# Patient Record
Sex: FEMALE | Race: WHITE | Employment: OTHER | URBAN - METROPOLITAN AREA
[De-identification: names, ages, dates, MRNs, and addresses within clinical notes are randomized per-mention and may not be internally consistent; named-entity substitution may affect disease eponyms.]

---

## 2017-07-25 ENCOUNTER — HOSPITAL ENCOUNTER (OUTPATIENT)
Dept: VASCULAR LAB | Age: 82
Discharge: HOME | End: 2017-07-25
Attending: INTERNAL MEDICINE | Admitting: INTERNAL MEDICINE

## 2017-07-25 DIAGNOSIS — R42 DIZZINESS AND GIDDINESS: ICD-10-CM

## 2017-08-24 ENCOUNTER — OFFICE VISIT (OUTPATIENT)
Dept: VASCULAR SURGERY | Age: 82
End: 2017-08-24

## 2017-08-24 VITALS
DIASTOLIC BLOOD PRESSURE: 74 MMHG | BODY MASS INDEX: 24.59 KG/M2 | HEIGHT: 66 IN | SYSTOLIC BLOOD PRESSURE: 134 MMHG | WEIGHT: 153 LBS

## 2017-08-24 DIAGNOSIS — I65.21 CAROTID STENOSIS, ASYMPTOMATIC, RIGHT: Primary | ICD-10-CM

## 2017-08-24 PROCEDURE — 1090F PRES/ABSN URINE INCON ASSESS: CPT | Performed by: SURGERY

## 2017-08-24 PROCEDURE — G8400 PT W/DXA NO RESULTS DOC: HCPCS | Performed by: SURGERY

## 2017-08-24 PROCEDURE — 1123F ACP DISCUSS/DSCN MKR DOCD: CPT | Performed by: SURGERY

## 2017-08-24 PROCEDURE — 1036F TOBACCO NON-USER: CPT | Performed by: SURGERY

## 2017-08-24 PROCEDURE — 4040F PNEUMOC VAC/ADMIN/RCVD: CPT | Performed by: SURGERY

## 2017-08-24 PROCEDURE — G8427 DOCREV CUR MEDS BY ELIG CLIN: HCPCS | Performed by: SURGERY

## 2017-08-24 PROCEDURE — G8598 ASA/ANTIPLAT THER USED: HCPCS | Performed by: SURGERY

## 2017-08-24 PROCEDURE — 99204 OFFICE O/P NEW MOD 45 MIN: CPT | Performed by: SURGERY

## 2017-08-24 PROCEDURE — G8420 CALC BMI NORM PARAMETERS: HCPCS | Performed by: SURGERY

## 2017-10-30 ENCOUNTER — HOSPITAL ENCOUNTER (OUTPATIENT)
Dept: WOMENS IMAGING | Age: 82
Discharge: HOME | End: 2017-10-30
Attending: INTERNAL MEDICINE | Admitting: INTERNAL MEDICINE

## 2017-10-30 DIAGNOSIS — Z12.31 VISIT FOR SCREENING MAMMOGRAM: ICD-10-CM

## 2017-12-05 ENCOUNTER — OFFICE VISIT (OUTPATIENT)
Dept: ORTHOPEDIC SURGERY | Age: 82
End: 2017-12-05

## 2017-12-05 VITALS
DIASTOLIC BLOOD PRESSURE: 71 MMHG | HEART RATE: 66 BPM | WEIGHT: 153 LBS | HEIGHT: 66 IN | TEMPERATURE: 97.3 F | BODY MASS INDEX: 24.59 KG/M2 | SYSTOLIC BLOOD PRESSURE: 139 MMHG

## 2017-12-05 DIAGNOSIS — M25.562 LEFT KNEE PAIN, UNSPECIFIED CHRONICITY: Primary | ICD-10-CM

## 2017-12-05 DIAGNOSIS — S76.312A LEFT HAMSTRING MUSCLE STRAIN, INITIAL ENCOUNTER: ICD-10-CM

## 2017-12-05 PROCEDURE — G8400 PT W/DXA NO RESULTS DOC: HCPCS | Performed by: PHYSICIAN ASSISTANT

## 2017-12-05 PROCEDURE — 4040F PNEUMOC VAC/ADMIN/RCVD: CPT | Performed by: PHYSICIAN ASSISTANT

## 2017-12-05 PROCEDURE — 1036F TOBACCO NON-USER: CPT | Performed by: PHYSICIAN ASSISTANT

## 2017-12-05 PROCEDURE — 1123F ACP DISCUSS/DSCN MKR DOCD: CPT | Performed by: PHYSICIAN ASSISTANT

## 2017-12-05 PROCEDURE — G8420 CALC BMI NORM PARAMETERS: HCPCS | Performed by: PHYSICIAN ASSISTANT

## 2017-12-05 PROCEDURE — G8598 ASA/ANTIPLAT THER USED: HCPCS | Performed by: PHYSICIAN ASSISTANT

## 2017-12-05 PROCEDURE — 99213 OFFICE O/P EST LOW 20 MIN: CPT | Performed by: PHYSICIAN ASSISTANT

## 2017-12-05 PROCEDURE — G8427 DOCREV CUR MEDS BY ELIG CLIN: HCPCS | Performed by: PHYSICIAN ASSISTANT

## 2017-12-05 PROCEDURE — 1090F PRES/ABSN URINE INCON ASSESS: CPT | Performed by: PHYSICIAN ASSISTANT

## 2017-12-05 PROCEDURE — G8484 FLU IMMUNIZE NO ADMIN: HCPCS | Performed by: PHYSICIAN ASSISTANT

## 2017-12-05 RX ORDER — NAPROXEN 500 MG/1
500 TABLET ORAL 2 TIMES DAILY WITH MEALS
Qty: 60 TABLET | Refills: 0 | Status: SHIPPED | OUTPATIENT
Start: 2017-12-05 | End: 2018-12-03

## 2017-12-05 RX ORDER — HYDROCODONE BITARTRATE AND ACETAMINOPHEN 5; 325 MG/1; MG/1
1 TABLET ORAL EVERY 6 HOURS PRN
Qty: 28 TABLET | Refills: 0 | Status: SHIPPED | OUTPATIENT
Start: 2017-12-05 | End: 2017-12-12

## 2017-12-07 NOTE — PROGRESS NOTES
Never Used    Alcohol use Yes      Comment: social    Drug use: No    Sexual activity: Not on file       Current Outpatient Prescriptions   Medication Sig Dispense Refill    naproxen (NAPROSYN) 500 MG tablet Take 1 tablet by mouth 2 times daily (with meals) for 30 doses 60 tablet 0    HYDROcodone-acetaminophen (NORCO) 5-325 MG per tablet Take 1 tablet by mouth every 6 hours as needed for Pain . 28 tablet 0    sucralfate (CARAFATE) 1 GM tablet Take 1 g by mouth. BEFORE MEALS      lansoprazole (PREVACID) 30 MG capsule Take 30 mg by mouth daily.  aspirin 81 MG tablet Take 81 mg by mouth daily.  Misc Intestinal Radha Regulat (ALIGN) CAPS Take  by mouth daily.  Multiple Vitamin (MULTI-VITAMIN PO) Take  by mouth daily.  CALCIUM ACETATE by Does not apply route 2 times daily.  GLUCOSAMINE CHONDROITIN COMPLX PO Take  by mouth 2 times daily.  metoprolol (LOPRESSOR) 50 MG tablet Take 50 mg by mouth 2 times daily.  valsartan (DIOVAN) 160 MG TABS Take 160 mg by mouth daily. No current facility-administered medications for this visit. Objective:     She is alert, oriented x 3, pleasant, well nourished, developed and in no   acute distress. /71   Pulse 66   Temp 97.3 °F (36.3 °C)   Ht 5' 6\" (1.676 m)   Wt 153 lb (69.4 kg)   BMI 24.69 kg/m²      Examination of the left hip(s) shows: Full ROM without pain. Non tender to palpation. No erythema or soft tissue swelling. There is no pain with weight bearing and no limp with ambulation. There is no instability of the hip joint. There is mild fullness and tenderness in the proximal left hamstring region. Passive stretching of the left hamstring reproduces her pain. Examination of the left knee shows: The alignment of the knee is neutral.   There is not erythema. There no soft tissue swelling. There is no effusion.   ROM-  Extension 0          -   Flexion  125   There no pain associated with ROM testing. Extensor Mechanism is  intact. Examination of the lower extremities are intact with sensation to light touch. Motor testing  5/5 in all major motor groups of the lower extremities. Gait is normal heel to toe. Gait is not antalgic. Negative Olvera's Sign. SLR negative. Examination of the lower extremities shows intact perfusion to all extremities. No cyanosis. Digits are warm to touch, capillary refill is less than 2 seconds. no edema noted. Examination of the skin over both lower extremities reveals: The skin to be intact without lacerations or abrasions. No significant erythema. No rashes or skin lesions. X Rays: not performed in the office today as I believe this is all a soft tissue injury of the hamstring. Additional Tests reviewed: Additional Outside Records reviewed:     Diagnosis:       ICD-10-CM ICD-9-CM    1. Left knee pain, unspecified chronicity M25.562 719.46    2. Left hamstring muscle strain, initial encounter V90.461H 843.8 Ambulatory referral to Physical Therapy        Assessment and Plan:     The natural history of the patient's diagnosis as well as the treatment options were discussed in full and questions were answered. Risks and benefits of the treatment options also reviewed in detail. I believe she has a partial tear of the proximal hamstring musculature. I do not feel she has a tendon injury. I do not feel this is a bony structural injury. Rest, Ice, Compression and Elevation  OTC NSAID'S discussed to be taken in appropriate  therapeutic doses. Activity restriction/ Modification discussed. The patient was advised that NSAID-type medications have two very important potential side effects: gastrointestinal irritation including hemorrhage and renal injuries. She was asked to take the medication with food and to stop if she experiences any GI upset. I asked her to call for vomiting, abdominal pain or black/bloody stools.  He should

## 2017-12-19 ENCOUNTER — HOSPITAL ENCOUNTER (OUTPATIENT)
Dept: OTHER | Age: 82
Discharge: HOME | End: 2017-12-31
Attending: PHYSICIAN ASSISTANT | Admitting: PHYSICIAN ASSISTANT

## 2017-12-19 DIAGNOSIS — I65.23 BILATERAL CAROTID ARTERY STENOSIS: Primary | ICD-10-CM

## 2017-12-19 ASSESSMENT — PAIN DESCRIPTION - ORIENTATION: ORIENTATION: LEFT

## 2017-12-19 ASSESSMENT — PAIN DESCRIPTION - PROGRESSION: CLINICAL_PROGRESSION: GRADUALLY WORSENING

## 2017-12-19 ASSESSMENT — PAIN DESCRIPTION - PAIN TYPE: TYPE: ACUTE PAIN

## 2017-12-19 ASSESSMENT — PAIN DESCRIPTION - ONSET: ONSET: SUDDEN

## 2017-12-19 ASSESSMENT — PAIN SCALES - GENERAL: PAINLEVEL_OUTOF10: 10

## 2017-12-19 ASSESSMENT — PAIN DESCRIPTION - LOCATION: LOCATION: LEG

## 2017-12-19 ASSESSMENT — PAIN DESCRIPTION - FREQUENCY: FREQUENCY: CONTINUOUS

## 2017-12-19 ASSESSMENT — PAIN DESCRIPTION - DESCRIPTORS: DESCRIPTORS: BURNING;SHARP;CONSTANT

## 2017-12-19 NOTE — FLOWSHEET NOTE
while pt was prone  Modalities:    US x 8 min to left hamstring while prone at 1/4/W/cm2, CP x 10 min to hamstring  Timed Code Treatment Minutes:    35  Total Treatment Minutes:    65  Treatment/Activity Tolerance:  [x] Patient tolerated treatment well [] Patient limited by fatigue  [] Patient limited by pain  [] Patient limited by other medical complications  [] Other:     Prognosis: [x] Good [] Fair  [] Poor    Patient Requires Follow-up: [x] Yes  [] No    Plan:   [] Continue per plan of care [] Alter current plan (see comments)  [x] Plan of care initiated [] Hold pending MD visit [] Discharge  Plan for Next Session:    Continue with US, STM to hamstring and ice. Add exercises as able to tolerate.   Electronically signed by:  Cindi Whiteside PT

## 2017-12-19 NOTE — PLAN OF CARE
Outpatient Physical Therapy  [] [facility]    Phone: [phone]   Fax: [phone]   [x] [facility]  Phone: [phone]              Fax: [phone]  [] [facility]   Phone: [phone]   Fax: [phone]     To: Referring Practitioner: LA Perrin      Patient: Sarai Christian   : 1935   MRN: [id number]  Evaluation Date: 2017      Diagnosis Information:  · Diagnosis: Left hamstring muscle strain   · Treatment Diagnosis: Pain on left hamstring, weakness and limited flexibility of left hamstring, limited standing and walking time     Physical Therapy Certification/Re-Certification Form  Dear LA Perrin,    The following patient has been evaluated for physical therapy services and for therapy to continue, Medicare requires monthly physician review of the treatment plan. Please review the attached evaluation and/or summary of the patient's plan of care, and verify that you agree therapy should continue by signing the attached document and sending it back to our office. Plan of Care/Treatment to date:  [x] Therapeutic Exercise    [x] Modalities:  [x] Therapeutic Activity     [x] Ultrasound  [] Electrical Stimulation  [] Gait Training      [] Cervical Traction [] Lumbar Traction  [] Neuromuscular Re-education    [x] Cold/hotpack [] Iontophoresis   [x] Instruction in HEP     Other:  [x] Manual Therapy      []             [] Aquatic Therapy      []           ? Frequency/Duration:  # Days per week: [] 1 day # Weeks: [] 1 week [] 5 weeks     [x] 2 days? [] 2 weeks [] 6 weeks     [] 3 days   [] 3 weeks [] 7 weeks     [] 4 days   [x] 4 weeks [] 8 weeks    Rehab Potential: [] Excellent [x] Good [] Fair  [] Poor       Electronically signed by:  Erma Jeong PT      If you have any questions or concerns, please don't hesitate to call.   Thank you for your referral.      Physician Signature:________________________________Date:__________________  By signing above,

## 2017-12-26 ENCOUNTER — HOSPITAL ENCOUNTER (OUTPATIENT)
Dept: PHYSICAL THERAPY | Age: 82
Discharge: HOME | End: 2017-12-27
Admitting: PHYSICIAN ASSISTANT

## 2017-12-29 ENCOUNTER — HOSPITAL ENCOUNTER (OUTPATIENT)
Dept: PHYSICAL THERAPY | Age: 82
Discharge: HOME | End: 2017-12-30
Admitting: PHYSICIAN ASSISTANT

## 2018-01-01 ENCOUNTER — HOSPITAL ENCOUNTER (OUTPATIENT)
Dept: OTHER | Age: 83
Discharge: HOME | End: 2018-01-31
Attending: PHYSICIAN ASSISTANT | Admitting: PHYSICIAN ASSISTANT

## 2018-01-02 ENCOUNTER — HOSPITAL ENCOUNTER (OUTPATIENT)
Dept: PHYSICAL THERAPY | Age: 83
Discharge: HOME | End: 2018-01-03
Admitting: PHYSICIAN ASSISTANT

## 2018-01-02 NOTE — FLOWSHEET NOTE
Physical Therapy Daily Treatment Note  Date:  2018    Patient Name:  Charlotte Palumbo    :  1935  MRN: [id number]  Restrictions/Precautions:    Pertinent Medical History: Arthritis, blood clots, HTN  Medical/Treatment Diagnosis Information:  · Diagnosis: Left hamstring muscle strain  · Treatment Diagnosis: Pain on left hamstring, weakness and limited flexibility of left hamstring, limited standing and walking time  Insurance/Certification information:  PT Insurance Information: Medicare  Physician Information:  Referring Practitioner: LA Upton  Plan of care signed (Y/N):  Sent to Teri Murguia on 17  Visit# / total visits:   Pain level: 5/10     G-Code (if applicable):      Date / Visit # G-Code Applied: 17 visit  PT G-Codes  Functional Assessment Tool Used: LEFSTOOL  Score: 29  Functional Limitation: Mobility: Walking and moving around  Mobility: Walking and Moving Around Current Status (): At least 60 percent but less than 80 percent impaired, limited or restricted  Mobility: Walking and Moving Around Goal Status (): At least 40 percent but less than 60 percent impaired, limited or restricted    Progress Note: [x]  Yes  []  No  Next due by: Visit #10      History of Injury:   Pt was standing on stepstool and reached overhead to clean beams and she felt something pull on posterior left thigh. The next day, she had pain. It has gotten worse  over time and she finally went  to the doctor on Dec 5 and he referred her to PT. Subjective:   17 Patient reports hamstring is not as bad.  17: Pt reports that she feels a little better. She stood preparing dinner, but took breaks to sit down. 18 Patient reports she is able to stand and walk longer without increase of pain.     See above  Objective: See eval  Observation:   Test measurements:      Exercises:  Exercise/Equipment Resistance/Repetitions Other comments   Ankle pumps 30X    HS stretch Gentle stretches, 10\" x3 Add after pain diminishes in hamstring, 12/29/17: tried gentle stretches                                                                  Other Therapeutic Activities:    Reviewed LEFSTOOL and explained purpose  Home Exercise Program:      Manual Treatments:    STM on left hamstring while pt was prone. kinesiotape  Modalities:    US x 8 min to left hamstring while prone at 1/4/W/cm2, CP x 10 min to hamstring  Timed Code Treatment Minutes:    45  Total Treatment Minutes:    55  Treatment/Activity Tolerance:  [x] Patient tolerated treatment well [] Patient limited by fatigue  [] Patient limited by pain  [] Patient limited by other medical complications  [] Other:     Prognosis: [x] Good [] Fair  [] Poor    Patient Requires Follow-up: [x] Yes  [] No    Plan:   [] Continue per plan of care [] Alter current plan (see comments)  [x] Plan of care initiated [] Hold pending MD visit [] Discharge  Plan for Next Session:    Continue with US, STM to hamstring and ice. Add exercises as able to tolerate.   Electronically signed by:  Erika Rader PTA

## 2018-01-05 ENCOUNTER — HOSPITAL ENCOUNTER (OUTPATIENT)
Dept: PHYSICAL THERAPY | Age: 83
Discharge: HOME | End: 2018-01-06
Admitting: PHYSICIAN ASSISTANT

## 2018-01-05 NOTE — FLOWSHEET NOTE
Physical Therapy Daily Treatment Note  Date:  2018    Patient Name:  Roxanna Cox    :  1935  MRN: [id number]  Restrictions/Precautions:    Pertinent Medical History: Arthritis, blood clots, HTN  Medical/Treatment Diagnosis Information:  · Diagnosis: Left hamstring muscle strain  · Treatment Diagnosis: Pain on left hamstring, weakness and limited flexibility of left hamstring, limited standing and walking time  Insurance/Certification information:  PT Insurance Information: Medicare  Physician Information:  Referring Practitioner: LA Tejada  Plan of care signed (Y/N):  Sent to Devon Trujillo on 17  Visit# / total visits:   Pain level: 5/10     G-Code (if applicable):      Date / Visit # G-Code Applied: 17 visit  PT G-Codes  Functional Assessment Tool Used: LEFSTOOL  Score: 29  Functional Limitation: Mobility: Walking and moving around  Mobility: Walking and Moving Around Current Status (): At least 60 percent but less than 80 percent impaired, limited or restricted  Mobility: Walking and Moving Around Goal Status (): At least 40 percent but less than 60 percent impaired, limited or restricted    Progress Note: [x]  Yes  []  No  Next due by: Visit #10      History of Injury:   Pt was standing on stepstool and reached overhead to clean beams and she felt something pull on posterior left thigh. The next day, she had pain. It has gotten worse  over time and she finally went  to the doctor on Dec 5 and he referred her to PT. Subjective:   17 Patient reports hamstring is not as bad.  17: Pt reports that she feels a little better. She stood preparing dinner, but took breaks to sit down. 18 Patient reports she is able to stand and walk longer without increase of pain. 18: Pt reports she feels much better.     Objective: See eval  Observation:   Test measurements:      Exercises:  Exercise/Equipment Resistance/Repetitions Other comments

## 2018-01-09 ENCOUNTER — HOSPITAL ENCOUNTER (OUTPATIENT)
Dept: PHYSICAL THERAPY | Age: 83
Discharge: HOME | End: 2018-01-10
Admitting: PHYSICIAN ASSISTANT

## 2018-01-12 ENCOUNTER — HOSPITAL ENCOUNTER (OUTPATIENT)
Dept: PHYSICAL THERAPY | Age: 83
Discharge: HOME | End: 2018-01-13
Admitting: PHYSICIAN ASSISTANT

## 2018-01-18 ENCOUNTER — HOSPITAL ENCOUNTER (OUTPATIENT)
Dept: PHYSICAL THERAPY | Age: 83
Discharge: HOME | End: 2018-01-19
Admitting: PHYSICIAN ASSISTANT

## 2018-01-18 NOTE — PLAN OF CARE
Outpatient Physical Therapy  [] [facility]    Phone: [phone]   Fax: [phone]   [x] [facility]  Phone: [phone]              Fax: [phone]  [] [facility]   Phone: [phone]   Fax: [phone]     To:   Radha TOVAR     Patient: Irene Conroy   : 1935   MRN: [id number]  Evaluation Date: 2018      Diagnosis Information:  ·   Diagnosis: Left hamstring muscle strain          · Treatment Diagnosis: Pain on left hamstring, weakness and limited flexibility of left hamstring, limited standing and walking time    ·       Physical Therapy Certification/Re-Certification Form  Dear LA Sharpe,    The following patient has been evaluated for physical therapy services and for therapy to continue, Medicare requires monthly physician review of the treatment plan. Please review the attached evaluation and/or summary of the patient's plan of care, and verify that you agree therapy should continue by signing the attached document and sending it back to our office. 18: Pt reports her hamstring is worse. After questioning, she reported that she had to help her  up from the floor twice, she shoveled snow and lifted [place] boxes and decorations to put them away. Prior to this she was feeling better, In view of set back due to the above activities, I would like to request 4 additional PT visits. Plan of Care/Treatment to date:  [x] Therapeutic Exercise    [x] Modalities:  [x] Therapeutic Activity     [x] Ultrasound  [] Electrical Stimulation  [] Gait Training      [] Cervical Traction [] Lumbar Traction  [] Neuromuscular Re-education    [x] Cold/hotpack [] Iontophoresis   [x] Instruction in HEP     Other:  [x] Manual Therapy      []             [] Aquatic Therapy      []           ? Frequency/Duration:  # Days per week: [] 1 day # Weeks: [] 1 week [] 5 weeks     [x] 2 days?    [x] 2 weeks [] 6 weeks     [] 3 days   [] 3 weeks [] 7 weeks     [] 4 days   [] 4 weeks [] 8 weeks    Rehab Potential: [] Excellent [x] Good [] Fair  [] Poor       Electronically signed by:  Lynn Steward PT      If you have any questions or concerns, please don't hesitate to call.   Thank you for your referral.      Physician Signature:________________________________Date:__________________  By signing above, therapists plan is approved by physician

## 2018-01-18 NOTE — FLOWSHEET NOTE
Physical Therapy Daily Treatment Note  Date:  2018    Patient Name:  Tanesha Guy    :  1935  MRN: [id number]  Restrictions/Precautions:    Pertinent Medical History: Arthritis, blood clots, HTN  Medical/Treatment Diagnosis Information:  · Diagnosis: Left hamstring muscle strain  · Treatment Diagnosis: Pain on left hamstring, weakness and limited flexibility of left hamstring, limited standing and walking time  Insurance/Certification information:  PT Insurance Information: Medicare  Physician Information:  Referring Practitioner: LA Ugarte  Plan of care signed (Y/N):  Sent to Cat Matthew on 17  Visit# / total visits:   Pain level: 6+/10     G-Code (if applicable):      Date / Visit # G-Code Applied: 17 visit  PT G-Codes  Functional Assessment Tool Used: LEFSTOOL  Score: 29  Functional Limitation: Mobility: Walking and moving around  Mobility: Walking and Moving Around Current Status (): At least 60 percent but less than 80 percent impaired, limited or restricted  Mobility: Walking and Moving Around Goal Status (): At least 40 percent but less than 60 percent impaired, limited or restricted    Progress Note: [x]  Yes  []  No  Next due by: Visit #10      History of Injury:   Pt was standing on stepstool and reached overhead to clean beams and she felt something pull on posterior left thigh. The next day, she had pain. It has gotten worse  over time and she finally went  to the doctor on Dec 5 and he referred her to PT. Subjective:   17 Patient reports hamstring is not as bad.  17: Pt reports that she feels a little better. She stood preparing dinner, but took breaks to sit down. 18 Patient reports she is able to stand and walk longer without increase of pain. 18: Pt reports she feels much better. 18: Patient reports she is feeling better. 18 Patient reports she cont. to improve.   18: Pt reports her

## 2018-01-22 ENCOUNTER — HOSPITAL ENCOUNTER (OUTPATIENT)
Dept: PHYSICAL THERAPY | Age: 83
Discharge: HOME | End: 2018-01-23
Admitting: PHYSICIAN ASSISTANT

## 2018-01-22 NOTE — FLOWSHEET NOTE
Physical Therapy Daily Treatment Note  Date:  2018    Patient Name:  Blair Arboleda    :  1935  MRN: [id number]  Restrictions/Precautions:    Pertinent Medical History: Arthritis, blood clots, HTN  Medical/Treatment Diagnosis Information:  · Diagnosis: Left hamstring muscle strain  · Treatment Diagnosis: Pain on left hamstring, weakness and limited flexibility of left hamstring, limited standing and walking time  Insurance/Certification information:  PT Insurance Information: Medicare  Physician Information:  Referring Practitioner: LA Hogue  Plan of care signed (Y/N):  Sent to Jayme Velazquez on 17  Visit# / total visits:   Pain level: 2/10     G-Code (if applicable):      Date / Visit # G-Code Applied: 17 visit  PT G-Codes  Functional Assessment Tool Used: LEFSTOOL  Score: 29  Functional Limitation: Mobility: Walking and moving around  Mobility: Walking and Moving Around Current Status (): At least 60 percent but less than 80 percent impaired, limited or restricted  Mobility: Walking and Moving Around Goal Status (): At least 40 percent but less than 60 percent impaired, limited or restricted    Progress Note: [x]  Yes  []  No  Next due by: Visit #10      History of Injury:   Pt was standing on stepstool and reached overhead to clean beams and she felt something pull on posterior left thigh. The next day, she had pain. It has gotten worse  over time and she finally went  to the doctor on Dec 5 and he referred her to PT. Subjective:   17 Patient reports hamstring is not as bad.  17: Pt reports that she feels a little better. She stood preparing dinner, but took breaks to sit down. 18 Patient reports she is able to stand and walk longer without increase of pain. 18: Pt reports she feels much better. 18: Patient reports she is feeling better. 18 Patient reports she cont. to improve.   18: Pt reports her hamstring is worse. After questioning, she reported that she had to help her  up from the floor twice, she shoveled snow and lifted [place] boxes and decorations to put them away. 1/22/18: Pt reports that her pain is less. Objective: See eval  Observation:   Test measurements:      Exercises:  Exercise/Equipment Resistance/Repetitions Other comments   Ankle pumps 30X    HS stretch Gentle stretches, 10\" x3 Add after pain diminishes in hamstring, 12/29/17: tried gentle stretches   Incline Board 3 x 30 sec 1/5/18                                                             Other Therapeutic Activities:    Reviewed LEFSTOOL and explained purpose. 1/18/18: Discussed with pt the importance of avoiding lifting, and proper posture while cooking and ironing. Home Exercise Program:      Manual Treatments:    STM with [place] on left hamstring while pt was right sidelying. Modalities:    US x 8 min to left hamstring while sidelying 1/4/W/cm2, CP x 10 min to hamstring  Timed Code Treatment Minutes:    45  Total Treatment Minutes:    55  Treatment/Activity Tolerance:  [x] Patient tolerated treatment well [] Patient limited by fatigue  [] Patient limited by pain  [] Patient limited by other medical complications  [] Other:     Prognosis: [x] Good [] Fair  [] Poor    Patient Requires Follow-up: [x] Yes  [] No    Plan:   [] Continue per plan of care [] Alter current plan (see comments)  [x] Plan of care initiated [] Hold pending MD visit [] Discharge  Plan for Next Session:    Continue with US, STM to hamstring and ice. Continue exercises as able to tolerate. Send note to Jc Denson and request a few more PT sessions.   Electronically signed by:  Danette Marcus PT

## 2018-01-24 ENCOUNTER — HOSPITAL ENCOUNTER (OUTPATIENT)
Dept: PHYSICAL THERAPY | Age: 83
Discharge: HOME | End: 2018-01-25
Admitting: PHYSICIAN ASSISTANT

## 2018-01-24 NOTE — FLOWSHEET NOTE
Physical Therapy Daily Treatment Note  Date:  2018    Patient Name:  Alex Hobson    :  1935  MRN: [id number]  Restrictions/Precautions:    Pertinent Medical History: Arthritis, blood clots, HTN  Medical/Treatment Diagnosis Information:  · Diagnosis: Left hamstring muscle strain  · Treatment Diagnosis: Pain on left hamstring, weakness and limited flexibility of left hamstring, limited standing and walking time  Insurance/Certification information:  PT Insurance Information: Medicare  Physician Information:  Referring Practitioner: LA Cornelius  Plan of care signed (Y/N):  Sent to Vidhi Buchanan on 17  Visit# / total visits: 10 /12  Pain level: 2/10     G-Code (if applicable):      Date / Visit # G-Code Applied:  visit  PT G-Codes  Functional Assessment Tool Used: LEFSTOOL  Score: 46  Functional Limitation: Mobility: Walking and moving around  Mobility: Walking and Moving Around Current Status (): CK  Mobility: Walking and Moving Around Goal Status (): CK    Progress Note: [x]  Yes  []  No  Next due by: Visit #10      History of Injury:   Pt was standing on stepstool and reached overhead to clean beams and she felt something pull on posterior left thigh. The next day, she had pain. It has gotten worse  over time and she finally went  to the doctor on Dec 5 and he referred her to PT. Subjective:   17 Patient reports hamstring is not as bad.  17: Pt reports that she feels a little better. She stood preparing dinner, but took breaks to sit down. 18 Patient reports she is able to stand and walk longer without increase of pain. 18: Pt reports she feels much better. 18: Patient reports she is feeling better. 18 Patient reports she cont. to improve. 18: Pt reports her hamstring is worse.  After questioning, she reported that she had to help her  up from the floor twice, she shoveled snow and lifted [place] boxes

## 2018-01-29 ENCOUNTER — HOSPITAL ENCOUNTER (OUTPATIENT)
Dept: PHYSICAL THERAPY | Age: 83
Discharge: HOME | End: 2018-01-30
Admitting: PHYSICIAN ASSISTANT

## 2018-01-31 ENCOUNTER — HOSPITAL ENCOUNTER (OUTPATIENT)
Dept: PHYSICAL THERAPY | Age: 83
Discharge: HOME | End: 2018-02-01
Admitting: PHYSICIAN ASSISTANT

## 2018-02-01 ENCOUNTER — HOSPITAL ENCOUNTER (OUTPATIENT)
Dept: OTHER | Age: 83
Discharge: HOME | End: 2018-02-28
Attending: PHYSICIAN ASSISTANT | Admitting: PHYSICIAN ASSISTANT

## 2018-03-01 ENCOUNTER — HOSPITAL ENCOUNTER (OUTPATIENT)
Dept: OTHER | Age: 83
Discharge: HOME | End: 2018-03-26
Attending: PHYSICIAN ASSISTANT | Admitting: PHYSICIAN ASSISTANT

## 2018-05-10 ENCOUNTER — OFFICE VISIT (OUTPATIENT)
Dept: VASCULAR SURGERY | Age: 83
End: 2018-05-10

## 2018-05-10 ENCOUNTER — HOSPITAL ENCOUNTER (OUTPATIENT)
Dept: VASCULAR LAB | Age: 83
Discharge: HOME | End: 2018-05-10
Attending: SURGERY | Admitting: SURGERY

## 2018-05-10 VITALS
BODY MASS INDEX: 24.27 KG/M2 | HEIGHT: 66 IN | WEIGHT: 151 LBS | DIASTOLIC BLOOD PRESSURE: 60 MMHG | SYSTOLIC BLOOD PRESSURE: 102 MMHG

## 2018-05-10 DIAGNOSIS — I65.23 BILATERAL CAROTID ARTERY STENOSIS: ICD-10-CM

## 2018-05-10 DIAGNOSIS — I65.21 CAROTID STENOSIS, ASYMPTOMATIC, RIGHT: Primary | ICD-10-CM

## 2018-05-10 DIAGNOSIS — I65.23 OCCLUSION AND STENOSIS OF BILATERAL CAROTID ARTERIES: ICD-10-CM

## 2018-05-10 PROCEDURE — G8400 PT W/DXA NO RESULTS DOC: HCPCS | Performed by: SURGERY

## 2018-05-10 PROCEDURE — 99213 OFFICE O/P EST LOW 20 MIN: CPT | Performed by: SURGERY

## 2018-05-10 PROCEDURE — 1036F TOBACCO NON-USER: CPT | Performed by: SURGERY

## 2018-05-10 PROCEDURE — G8598 ASA/ANTIPLAT THER USED: HCPCS | Performed by: SURGERY

## 2018-05-10 PROCEDURE — G8427 DOCREV CUR MEDS BY ELIG CLIN: HCPCS | Performed by: SURGERY

## 2018-05-10 PROCEDURE — 1123F ACP DISCUSS/DSCN MKR DOCD: CPT | Performed by: SURGERY

## 2018-05-10 PROCEDURE — 1090F PRES/ABSN URINE INCON ASSESS: CPT | Performed by: SURGERY

## 2018-05-10 PROCEDURE — 4040F PNEUMOC VAC/ADMIN/RCVD: CPT | Performed by: SURGERY

## 2018-05-10 PROCEDURE — G8420 CALC BMI NORM PARAMETERS: HCPCS | Performed by: SURGERY

## 2018-05-10 RX ORDER — ATORVASTATIN CALCIUM 10 MG/1
10 TABLET, FILM COATED ORAL
COMMUNITY
Start: 2017-11-01 | End: 2018-12-03 | Stop reason: SDUPTHER

## 2018-11-28 ENCOUNTER — HOSPITAL ENCOUNTER (OUTPATIENT)
Dept: WOMENS IMAGING | Age: 83
Discharge: HOME | End: 2018-11-28
Payer: MEDICARE

## 2018-11-28 DIAGNOSIS — Z12.39 BREAST CANCER SCREENING: ICD-10-CM

## 2018-11-28 PROCEDURE — 77067 SCR MAMMO BI INCL CAD: CPT

## 2018-12-03 ENCOUNTER — OFFICE VISIT (OUTPATIENT)
Dept: FAMILY MEDICINE CLINIC | Age: 83
End: 2018-12-03
Payer: MEDICARE

## 2018-12-03 VITALS
RESPIRATION RATE: 15 BRPM | OXYGEN SATURATION: 98 % | TEMPERATURE: 97.8 F | WEIGHT: 152.6 LBS | SYSTOLIC BLOOD PRESSURE: 116 MMHG | DIASTOLIC BLOOD PRESSURE: 78 MMHG | BODY MASS INDEX: 24.53 KG/M2 | HEART RATE: 62 BPM | HEIGHT: 66 IN

## 2018-12-03 DIAGNOSIS — I65.23 BILATERAL CAROTID ARTERY STENOSIS: ICD-10-CM

## 2018-12-03 DIAGNOSIS — Z86.718 HISTORY OF DVT (DEEP VEIN THROMBOSIS): ICD-10-CM

## 2018-12-03 DIAGNOSIS — K21.9 GASTROESOPHAGEAL REFLUX DISEASE, ESOPHAGITIS PRESENCE NOT SPECIFIED: ICD-10-CM

## 2018-12-03 DIAGNOSIS — I10 ESSENTIAL HYPERTENSION: Primary | ICD-10-CM

## 2018-12-03 DIAGNOSIS — E78.2 MIXED HYPERLIPIDEMIA: ICD-10-CM

## 2018-12-03 DIAGNOSIS — Z78.0 POSTMENOPAUSAL STATE: ICD-10-CM

## 2018-12-03 PROCEDURE — G8598 ASA/ANTIPLAT THER USED: HCPCS | Performed by: FAMILY MEDICINE

## 2018-12-03 PROCEDURE — G8484 FLU IMMUNIZE NO ADMIN: HCPCS | Performed by: FAMILY MEDICINE

## 2018-12-03 PROCEDURE — 1101F PT FALLS ASSESS-DOCD LE1/YR: CPT | Performed by: FAMILY MEDICINE

## 2018-12-03 PROCEDURE — 1123F ACP DISCUSS/DSCN MKR DOCD: CPT | Performed by: FAMILY MEDICINE

## 2018-12-03 PROCEDURE — 1090F PRES/ABSN URINE INCON ASSESS: CPT | Performed by: FAMILY MEDICINE

## 2018-12-03 PROCEDURE — 99204 OFFICE O/P NEW MOD 45 MIN: CPT | Performed by: FAMILY MEDICINE

## 2018-12-03 PROCEDURE — G8400 PT W/DXA NO RESULTS DOC: HCPCS | Performed by: FAMILY MEDICINE

## 2018-12-03 PROCEDURE — G8427 DOCREV CUR MEDS BY ELIG CLIN: HCPCS | Performed by: FAMILY MEDICINE

## 2018-12-03 PROCEDURE — G8420 CALC BMI NORM PARAMETERS: HCPCS | Performed by: FAMILY MEDICINE

## 2018-12-03 PROCEDURE — 1036F TOBACCO NON-USER: CPT | Performed by: FAMILY MEDICINE

## 2018-12-03 PROCEDURE — 4040F PNEUMOC VAC/ADMIN/RCVD: CPT | Performed by: FAMILY MEDICINE

## 2018-12-03 RX ORDER — VALSARTAN 160 MG/1
160 TABLET ORAL DAILY
Qty: 90 TABLET | Refills: 1 | Status: SHIPPED | OUTPATIENT
Start: 2018-12-03 | End: 2019-06-19 | Stop reason: SDUPTHER

## 2018-12-03 RX ORDER — ATORVASTATIN CALCIUM 10 MG/1
10 TABLET, FILM COATED ORAL DAILY
Qty: 90 TABLET | Refills: 1 | Status: SHIPPED | OUTPATIENT
Start: 2018-12-03 | End: 2019-02-28

## 2018-12-03 ASSESSMENT — ENCOUNTER SYMPTOMS
SHORTNESS OF BREATH: 0
VOMITING: 0
DIARRHEA: 0
NAUSEA: 0
SORE THROAT: 0
EYE REDNESS: 0
COLOR CHANGE: 0
COUGH: 0
SINUS PRESSURE: 0

## 2018-12-03 ASSESSMENT — PATIENT HEALTH QUESTIONNAIRE - PHQ9
SUM OF ALL RESPONSES TO PHQ QUESTIONS 1-9: 0
1. LITTLE INTEREST OR PLEASURE IN DOING THINGS: 0
SUM OF ALL RESPONSES TO PHQ9 QUESTIONS 1 & 2: 0
2. FEELING DOWN, DEPRESSED OR HOPELESS: 0
SUM OF ALL RESPONSES TO PHQ QUESTIONS 1-9: 0

## 2018-12-03 NOTE — PROGRESS NOTES
Yes Historical Provider, MD   CALCIUM ACETATE by Does not apply route 2 times daily. Yes Historical Provider, MD   GLUCOSAMINE CHONDROITIN COMPLX PO Take  by mouth 2 times daily. Yes Historical Provider, MD   metoprolol (LOPRESSOR) 50 MG tablet Take 50 mg by mouth 2 times daily.  Yes Historical Provider, MD     Past Medical History:   Diagnosis Date    Arthritis     Blood clots     High blood pressure     Hx of blood clots     lung    Nausea & vomiting     UTI (urinary tract infection) 7/29/14    Vascular disease      Past Surgical History:   Procedure Laterality Date    CARPAL TUNNEL RELEASE      HYSTERECTOMY      NECK SURGERY      2 disc in neck    ROTATOR CUFF REPAIR      SHOULDER ARTHROPLASTY Left 7/29/14    TOTAL KNEE ARTHROPLASTY      left knee     TUMOR REMOVAL      tumor on thyroid ovary and tube    VARICOSE VEIN SURGERY       Family History   Problem Relation Age of Onset    Arthritis Other     High Blood Pressure Other     Stroke Other      Social History     Social History    Marital status:      Spouse name: N/A    Number of children: N/A    Years of education: N/A     Social History Main Topics    Smoking status: Former Smoker     Packs/day: 1.00    Smokeless tobacco: Never Used    Alcohol use Yes      Comment: social    Drug use: No    Sexual activity: Not Asked     Other Topics Concern    None     Social History Narrative    None     Allergies   Allergen Reactions    Morphine And Related     Codeine Nausea And Vomiting     Health Maintenance   Topic Date Due    DTaP/Tdap/Td vaccine (1 - Tdap) 02/08/1954    DEXA (modify frequency per FRAX score)  02/08/2001    Shingles Vaccine (1 of 2 - 2 Dose Series) 02/12/2010    Potassium monitoring  07/22/2015    Creatinine monitoring  07/22/2015    Flu vaccine (1) 09/01/2018    Pneumococcal low/med risk  Completed      Patient Active Problem List   Diagnosis    Primary localized osteoarthrosis, lower leg    Primary localized osteoarthrosis of shoulder region    Left hamstring muscle strain    Essential hypertension    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 Angela Soliz GERD (gastroesophageal reflux disease)    Mixed hyperlipidemia        LABS:  Lab Results   Component Value Date    GLUCOSE 85 07/22/2014     Lab Results   Component Value Date     07/22/2014    K 4.0 07/22/2014    CREATININE 0.7 07/22/2014     Cholesterol, Total   Date Value Ref Range Status   03/17/2010 192 <200 mg/dl Final     LDL Calculated   Date Value Ref Range Status   03/17/2010 112 (H) <100 mg/dl Final     HDL   Date Value Ref Range Status   03/17/2010 55 40 - 60 mg/dl Final     Triglycerides   Date Value Ref Range Status   03/17/2010 120 <150 mg/dl Final     No results found for: ALT, AST, GGT, ALKPHOS, BILITOT   Lab Results   Component Value Date    WBC 4.9 07/22/2014    HGB 10.3 (L) 07/30/2014    HCT 30.5 (L) 07/30/2014    MCV 93.4 07/22/2014     07/22/2014     No results found for: TSH  Lab Results   Component Value Date    LABA1C 5.7 07/22/2014     No results found for: PSA, PSADIA      PHYSICAL EXAM  /78 (Site: Right Upper Arm, Position: Sitting, Cuff Size: Medium Adult)   Pulse 62   Temp 97.8 °F (36.6 °C) (Oral)   Resp 15   Ht 5' 6\" (1.676 m)   Wt 152 lb 9.6 oz (69.2 kg)   SpO2 98%   BMI 24.63 kg/m²     BP Readings from Last 3 Encounters:   12/03/18 116/78   05/10/18 102/60   12/05/17 139/71       Wt Readings from Last 3 Encounters:   12/03/18 152 lb 9.6 oz (69.2 kg)   05/10/18 151 lb (68.5 kg)   12/05/17 153 lb (69.4 kg)        Physical Exam   Constitutional: She is oriented to person, place, and time. She appears well-developed and well-nourished. HENT:   Head: Normocephalic and atraumatic.    Mouth/Throat: Oropharynx is clear and moist.   TMs normal bilaterally   Eyes: Conjunctivae and EOM are normal.   Neck: Normal range of motion. Neck supple. No thyromegaly present. Cardiovascular: Normal rate, regular rhythm and normal heart sounds. No murmur heard. Pulmonary/Chest: Effort normal and breath sounds normal. She has no wheezes. Abdominal: Soft. Bowel sounds are normal. She exhibits no mass. There is no tenderness. Musculoskeletal: Normal range of motion. She exhibits no edema. Lymphadenopathy:     She has no cervical adenopathy. Neurological: She is alert and oriented to person, place, and time. She displays normal reflexes. Skin: Skin is warm and dry. No rash noted. Normal turgor   Psychiatric: She has a normal mood and affect. Thought content normal.       ASSESSMENT/PLAN:  1. Essential hypertension  Well-controlled on current regimen  - COMPREHENSIVE METABOLIC PANEL; Future  - valsartan (DIOVAN) 160 MG tablet; Take 1 tablet by mouth daily  Dispense: 90 tablet; Refill: 1    2. 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 Bilateral carotid artery stenosis - mild/moderate, followed by [facility] Dr. Jay Garcia    4. Gastroesophageal reflux disease, esophagitis presence not specified  Doing well on PPI    5. Mixed hyperlipidemia  Doing well on statin  - atorvastatin (LIPITOR) 10 MG tablet; Take 1 tablet by mouth daily  Dispense: 90 tablet; Refill: 1  - LIPID PANEL; Future    6. Postmenopausal state  This a screening for osteoporosis since she is still very mobile for her age and also helps in her 's care  - DEXA Bone Density Axial Skeleton; Future      Return in about 6 months (around 6/3/2019) for htn f/u.

## 2018-12-20 ENCOUNTER — TELEPHONE (OUTPATIENT)
Dept: FAMILY MEDICINE CLINIC | Age: 83
End: 2018-12-20

## 2019-01-03 DIAGNOSIS — E78.2 MIXED HYPERLIPIDEMIA: ICD-10-CM

## 2019-01-03 DIAGNOSIS — I10 ESSENTIAL HYPERTENSION: ICD-10-CM

## 2019-01-03 LAB
A/G RATIO: 1.5 (ref 1.1–2.2)
ALBUMIN SERPL-MCNC: 4.5 G/DL (ref 3.4–5)
ALP BLD-CCNC: 62 U/L (ref 40–129)
ALT SERPL-CCNC: 20 U/L (ref 10–40)
ANION GAP SERPL CALCULATED.3IONS-SCNC: 16 MMOL/L (ref 3–16)
AST SERPL-CCNC: 27 U/L (ref 15–37)
BILIRUB SERPL-MCNC: 0.3 MG/DL (ref 0–1)
BUN BLDV-MCNC: 24 MG/DL (ref 7–20)
CALCIUM SERPL-MCNC: 9.5 MG/DL (ref 8.3–10.6)
CHLORIDE BLD-SCNC: 101 MMOL/L (ref 99–110)
CHOLESTEROL, TOTAL: 132 MG/DL (ref 0–199)
CO2: 24 MMOL/L (ref 21–32)
CREAT SERPL-MCNC: 0.8 MG/DL (ref 0.6–1.2)
GFR AFRICAN AMERICAN: >60
GFR NON-AFRICAN AMERICAN: >60
GLOBULIN: 3 G/DL
GLUCOSE BLD-MCNC: 94 MG/DL (ref 70–99)
HDLC SERPL-MCNC: 58 MG/DL (ref 40–60)
LDL CHOLESTEROL CALCULATED: 61 MG/DL
POTASSIUM SERPL-SCNC: 4.5 MMOL/L (ref 3.5–5.1)
SODIUM BLD-SCNC: 141 MMOL/L (ref 136–145)
TOTAL PROTEIN: 7.5 G/DL (ref 6.4–8.2)
TRIGL SERPL-MCNC: 64 MG/DL (ref 0–150)
VLDLC SERPL CALC-MCNC: 13 MG/DL

## 2019-01-07 RX ORDER — METOPROLOL TARTRATE 50 MG/1
50 TABLET, FILM COATED ORAL 2 TIMES DAILY
Qty: 60 TABLET | Refills: 5 | Status: SHIPPED | OUTPATIENT
Start: 2019-01-07 | End: 2019-09-28 | Stop reason: SDUPTHER

## 2019-01-15 ENCOUNTER — HOSPITAL ENCOUNTER (OUTPATIENT)
Age: 84
Discharge: HOME | End: 2019-01-15
Payer: MEDICARE

## 2019-01-15 ENCOUNTER — HOSPITAL ENCOUNTER (OUTPATIENT)
Dept: GENERAL RADIOLOGY | Age: 84
Discharge: HOME | End: 2019-01-15
Payer: MEDICARE

## 2019-01-15 ENCOUNTER — OFFICE VISIT (OUTPATIENT)
Dept: FAMILY MEDICINE CLINIC | Age: 84
End: 2019-01-15
Payer: MEDICARE

## 2019-01-15 VITALS
DIASTOLIC BLOOD PRESSURE: 72 MMHG | TEMPERATURE: 99 F | SYSTOLIC BLOOD PRESSURE: 110 MMHG | HEART RATE: 72 BPM | BODY MASS INDEX: 23.89 KG/M2 | OXYGEN SATURATION: 94 % | RESPIRATION RATE: 18 BRPM | WEIGHT: 148 LBS

## 2019-01-15 DIAGNOSIS — B96.89 ACUTE BACTERIAL SINUSITIS: Primary | ICD-10-CM

## 2019-01-15 DIAGNOSIS — R06.89 DECREASED LUNG SOUNDS: ICD-10-CM

## 2019-01-15 DIAGNOSIS — R05.9 COUGH: ICD-10-CM

## 2019-01-15 DIAGNOSIS — E87.5 HYPERKALEMIA: Primary | ICD-10-CM

## 2019-01-15 DIAGNOSIS — K52.9 ACUTE GASTROENTERITIS: ICD-10-CM

## 2019-01-15 DIAGNOSIS — J01.90 ACUTE BACTERIAL SINUSITIS: Primary | ICD-10-CM

## 2019-01-15 LAB
A/G RATIO: 1.5 (ref 1.1–2.2)
ALBUMIN SERPL-MCNC: 4.4 G/DL (ref 3.4–5)
ALP BLD-CCNC: 56 U/L (ref 40–129)
ALT SERPL-CCNC: 16 U/L (ref 10–40)
ANION GAP SERPL CALCULATED.3IONS-SCNC: 17 MMOL/L (ref 3–16)
AST SERPL-CCNC: 27 U/L (ref 15–37)
BASOPHILS ABSOLUTE: 0 K/UL (ref 0–0.2)
BASOPHILS RELATIVE PERCENT: 0.8 %
BILIRUB SERPL-MCNC: 0.3 MG/DL (ref 0–1)
BUN BLDV-MCNC: 25 MG/DL (ref 7–20)
CALCIUM SERPL-MCNC: 9.3 MG/DL (ref 8.3–10.6)
CHLORIDE BLD-SCNC: 101 MMOL/L (ref 99–110)
CO2: 25 MMOL/L (ref 21–32)
CREAT SERPL-MCNC: 0.9 MG/DL (ref 0.6–1.2)
EOSINOPHILS ABSOLUTE: 0 K/UL (ref 0–0.6)
EOSINOPHILS RELATIVE PERCENT: 1 %
GFR AFRICAN AMERICAN: >60
GFR NON-AFRICAN AMERICAN: 60
GLOBULIN: 3 G/DL
GLUCOSE BLD-MCNC: 129 MG/DL (ref 70–99)
HCT VFR BLD CALC: 42.9 % (ref 36–48)
HEMOGLOBIN: 14.1 G/DL (ref 12–16)
LYMPHOCYTES ABSOLUTE: 0.5 K/UL (ref 1–5.1)
LYMPHOCYTES RELATIVE PERCENT: 11.2 %
MCH RBC QN AUTO: 31.1 PG (ref 26–34)
MCHC RBC AUTO-ENTMCNC: 32.9 G/DL (ref 31–36)
MCV RBC AUTO: 94.7 FL (ref 80–100)
MONOCYTES ABSOLUTE: 0.5 K/UL (ref 0–1.3)
MONOCYTES RELATIVE PERCENT: 12.2 %
NEUTROPHILS ABSOLUTE: 3.4 K/UL (ref 1.7–7.7)
NEUTROPHILS RELATIVE PERCENT: 74.8 %
PDW BLD-RTO: 13.5 % (ref 12.4–15.4)
PLATELET # BLD: 221 K/UL (ref 135–450)
PMV BLD AUTO: 9.6 FL (ref 5–10.5)
POTASSIUM SERPL-SCNC: 5.2 MMOL/L (ref 3.5–5.1)
RBC # BLD: 4.53 M/UL (ref 4–5.2)
SODIUM BLD-SCNC: 143 MMOL/L (ref 136–145)
TOTAL PROTEIN: 7.4 G/DL (ref 6.4–8.2)
WBC # BLD: 4.5 K/UL (ref 4–11)

## 2019-01-15 PROCEDURE — G8420 CALC BMI NORM PARAMETERS: HCPCS | Performed by: NURSE PRACTITIONER

## 2019-01-15 PROCEDURE — G8484 FLU IMMUNIZE NO ADMIN: HCPCS | Performed by: NURSE PRACTITIONER

## 2019-01-15 PROCEDURE — 71046 X-RAY EXAM CHEST 2 VIEWS: CPT

## 2019-01-15 PROCEDURE — 1101F PT FALLS ASSESS-DOCD LE1/YR: CPT | Performed by: NURSE PRACTITIONER

## 2019-01-15 PROCEDURE — 4040F PNEUMOC VAC/ADMIN/RCVD: CPT | Performed by: NURSE PRACTITIONER

## 2019-01-15 PROCEDURE — 1090F PRES/ABSN URINE INCON ASSESS: CPT | Performed by: NURSE PRACTITIONER

## 2019-01-15 PROCEDURE — 1123F ACP DISCUSS/DSCN MKR DOCD: CPT | Performed by: NURSE PRACTITIONER

## 2019-01-15 PROCEDURE — G8427 DOCREV CUR MEDS BY ELIG CLIN: HCPCS | Performed by: NURSE PRACTITIONER

## 2019-01-15 PROCEDURE — G8400 PT W/DXA NO RESULTS DOC: HCPCS | Performed by: NURSE PRACTITIONER

## 2019-01-15 PROCEDURE — 1036F TOBACCO NON-USER: CPT | Performed by: NURSE PRACTITIONER

## 2019-01-15 PROCEDURE — G8598 ASA/ANTIPLAT THER USED: HCPCS | Performed by: NURSE PRACTITIONER

## 2019-01-15 PROCEDURE — 99214 OFFICE O/P EST MOD 30 MIN: CPT | Performed by: NURSE PRACTITIONER

## 2019-01-15 RX ORDER — AZITHROMYCIN 250 MG/1
TABLET, FILM COATED ORAL
Qty: 1 PACKET | Refills: 0 | Status: SHIPPED | OUTPATIENT
Start: 2019-01-15 | End: 2019-01-25

## 2019-01-15 ASSESSMENT — ENCOUNTER SYMPTOMS
RHINORRHEA: 1
COUGH: 1
DIARRHEA: 1
WHEEZING: 1
SORE THROAT: 0
CHEST TIGHTNESS: 1
VOMITING: 0
SINUS PRESSURE: 0
NAUSEA: 0
SHORTNESS OF BREATH: 0

## 2019-01-18 ENCOUNTER — HOSPITAL ENCOUNTER (OUTPATIENT)
Dept: WOMENS IMAGING | Age: 84
Discharge: HOME | End: 2019-01-18
Payer: MEDICARE

## 2019-01-18 ENCOUNTER — OFFICE VISIT (OUTPATIENT)
Dept: FAMILY MEDICINE CLINIC | Age: 84
End: 2019-01-18
Payer: MEDICARE

## 2019-01-18 VITALS
TEMPERATURE: 97.8 F | BODY MASS INDEX: 23.3 KG/M2 | DIASTOLIC BLOOD PRESSURE: 62 MMHG | HEART RATE: 83 BPM | HEIGHT: 66 IN | WEIGHT: 145 LBS | SYSTOLIC BLOOD PRESSURE: 100 MMHG

## 2019-01-18 DIAGNOSIS — A04.8 OTHER SPECIFIED BACTERIAL INTESTINAL INFECTIONS: ICD-10-CM

## 2019-01-18 DIAGNOSIS — R19.7 DIARRHEA, UNSPECIFIED TYPE: Primary | ICD-10-CM

## 2019-01-18 DIAGNOSIS — Z78.0 POSTMENOPAUSAL STATE: ICD-10-CM

## 2019-01-18 PROCEDURE — 1101F PT FALLS ASSESS-DOCD LE1/YR: CPT | Performed by: FAMILY MEDICINE

## 2019-01-18 PROCEDURE — 99213 OFFICE O/P EST LOW 20 MIN: CPT | Performed by: FAMILY MEDICINE

## 2019-01-18 PROCEDURE — 4040F PNEUMOC VAC/ADMIN/RCVD: CPT | Performed by: FAMILY MEDICINE

## 2019-01-18 PROCEDURE — G8420 CALC BMI NORM PARAMETERS: HCPCS | Performed by: FAMILY MEDICINE

## 2019-01-18 PROCEDURE — 1036F TOBACCO NON-USER: CPT | Performed by: FAMILY MEDICINE

## 2019-01-18 PROCEDURE — G8399 PT W/DXA RESULTS DOCUMENT: HCPCS | Performed by: FAMILY MEDICINE

## 2019-01-18 PROCEDURE — G8484 FLU IMMUNIZE NO ADMIN: HCPCS | Performed by: FAMILY MEDICINE

## 2019-01-18 PROCEDURE — 1090F PRES/ABSN URINE INCON ASSESS: CPT | Performed by: FAMILY MEDICINE

## 2019-01-18 PROCEDURE — 1123F ACP DISCUSS/DSCN MKR DOCD: CPT | Performed by: FAMILY MEDICINE

## 2019-01-18 PROCEDURE — G8598 ASA/ANTIPLAT THER USED: HCPCS | Performed by: FAMILY MEDICINE

## 2019-01-18 PROCEDURE — G8427 DOCREV CUR MEDS BY ELIG CLIN: HCPCS | Performed by: FAMILY MEDICINE

## 2019-01-18 PROCEDURE — 77080 DXA BONE DENSITY AXIAL: CPT

## 2019-01-18 RX ORDER — DICYCLOMINE HYDROCHLORIDE 10 MG/1
10 CAPSULE ORAL 4 TIMES DAILY
Qty: 360 CAPSULE | Refills: 1 | Status: SHIPPED | OUTPATIENT
Start: 2019-01-18 | End: 2021-07-28 | Stop reason: SDUPTHER

## 2019-01-18 RX ORDER — DICYCLOMINE HYDROCHLORIDE 10 MG/1
10 CAPSULE ORAL 4 TIMES DAILY
Qty: 360 CAPSULE | Refills: 1 | Status: SHIPPED | OUTPATIENT
Start: 2019-01-18 | End: 2019-01-18 | Stop reason: SDUPTHER

## 2019-01-18 ASSESSMENT — ENCOUNTER SYMPTOMS
COUGH: 1
BLOOD IN STOOL: 0
ABDOMINAL PAIN: 0
DIARRHEA: 1
SHORTNESS OF BREATH: 0

## 2019-01-19 ENCOUNTER — TELEPHONE (OUTPATIENT)
Dept: FAMILY MEDICINE CLINIC | Age: 84
End: 2019-01-19

## 2019-01-21 ENCOUNTER — TELEPHONE (OUTPATIENT)
Dept: FAMILY MEDICINE CLINIC | Age: 84
End: 2019-01-21

## 2019-01-21 LAB — C DIFFICILE TOXIN, EIA: NORMAL

## 2019-01-22 PROBLEM — M85.89 OSTEOPENIA OF MULTIPLE SITES: Status: ACTIVE | Noted: 2019-01-22

## 2019-01-22 LAB — GI BACTERIAL PATHOGENS BY PCR: NORMAL

## 2019-02-06 ENCOUNTER — TELEPHONE (OUTPATIENT)
Dept: FAMILY MEDICINE CLINIC | Age: 84
End: 2019-02-06

## 2019-02-28 ENCOUNTER — OFFICE VISIT (OUTPATIENT)
Dept: VASCULAR SURGERY | Age: 84
End: 2019-02-28
Payer: MEDICARE

## 2019-02-28 ENCOUNTER — PROCEDURE VISIT (OUTPATIENT)
Dept: SURGERY | Age: 84
End: 2019-02-28
Payer: MEDICARE

## 2019-02-28 VITALS
HEIGHT: 66 IN | SYSTOLIC BLOOD PRESSURE: 138 MMHG | WEIGHT: 156 LBS | BODY MASS INDEX: 25.07 KG/M2 | DIASTOLIC BLOOD PRESSURE: 76 MMHG

## 2019-02-28 DIAGNOSIS — I65.23 BILATERAL CAROTID ARTERY STENOSIS: Primary | ICD-10-CM

## 2019-02-28 DIAGNOSIS — I65.21 CAROTID STENOSIS, ASYMPTOMATIC, RIGHT: Primary | ICD-10-CM

## 2019-02-28 PROCEDURE — 4040F PNEUMOC VAC/ADMIN/RCVD: CPT | Performed by: SURGERY

## 2019-02-28 PROCEDURE — G8484 FLU IMMUNIZE NO ADMIN: HCPCS | Performed by: SURGERY

## 2019-02-28 PROCEDURE — G8598 ASA/ANTIPLAT THER USED: HCPCS | Performed by: SURGERY

## 2019-02-28 PROCEDURE — 99214 OFFICE O/P EST MOD 30 MIN: CPT | Performed by: SURGERY

## 2019-02-28 PROCEDURE — 1101F PT FALLS ASSESS-DOCD LE1/YR: CPT | Performed by: SURGERY

## 2019-02-28 PROCEDURE — G8419 CALC BMI OUT NRM PARAM NOF/U: HCPCS | Performed by: SURGERY

## 2019-02-28 PROCEDURE — 1123F ACP DISCUSS/DSCN MKR DOCD: CPT | Performed by: SURGERY

## 2019-02-28 PROCEDURE — 93880 EXTRACRANIAL BILAT STUDY: CPT | Performed by: SURGERY

## 2019-02-28 PROCEDURE — 1036F TOBACCO NON-USER: CPT | Performed by: SURGERY

## 2019-02-28 PROCEDURE — G8427 DOCREV CUR MEDS BY ELIG CLIN: HCPCS | Performed by: SURGERY

## 2019-02-28 PROCEDURE — 1090F PRES/ABSN URINE INCON ASSESS: CPT | Performed by: SURGERY

## 2019-02-28 PROCEDURE — G8399 PT W/DXA RESULTS DOCUMENT: HCPCS | Performed by: SURGERY

## 2019-02-28 RX ORDER — SIMVASTATIN 10 MG
10 TABLET ORAL NIGHTLY
COMMUNITY
End: 2020-05-27

## 2019-06-19 DIAGNOSIS — I10 ESSENTIAL HYPERTENSION: ICD-10-CM

## 2019-06-20 RX ORDER — VALSARTAN 160 MG/1
TABLET ORAL
Qty: 90 TABLET | Refills: 1 | Status: SHIPPED | OUTPATIENT
Start: 2019-06-20 | End: 2019-12-14 | Stop reason: SDUPTHER

## 2019-06-20 NOTE — TELEPHONE ENCOUNTER
rx approved. Please have pt get blood work to monitor potassium. It was a little high last time, and this BP medication can cause that. Thanks.

## 2019-06-26 ENCOUNTER — TELEPHONE (OUTPATIENT)
Dept: FAMILY MEDICINE CLINIC | Age: 84
End: 2019-06-26

## 2019-07-10 ENCOUNTER — OFFICE VISIT (OUTPATIENT)
Dept: FAMILY MEDICINE CLINIC | Age: 84
End: 2019-07-10
Payer: MEDICARE

## 2019-07-10 VITALS
SYSTOLIC BLOOD PRESSURE: 122 MMHG | OXYGEN SATURATION: 95 % | TEMPERATURE: 99.1 F | DIASTOLIC BLOOD PRESSURE: 68 MMHG | HEART RATE: 68 BPM | RESPIRATION RATE: 16 BRPM | HEIGHT: 66 IN | BODY MASS INDEX: 23.72 KG/M2 | WEIGHT: 147.6 LBS

## 2019-07-10 DIAGNOSIS — Z00.00 ROUTINE GENERAL MEDICAL EXAMINATION AT A HEALTH CARE FACILITY: Primary | ICD-10-CM

## 2019-07-10 PROCEDURE — 4040F PNEUMOC VAC/ADMIN/RCVD: CPT | Performed by: FAMILY MEDICINE

## 2019-07-10 PROCEDURE — 1123F ACP DISCUSS/DSCN MKR DOCD: CPT | Performed by: FAMILY MEDICINE

## 2019-07-10 PROCEDURE — G8598 ASA/ANTIPLAT THER USED: HCPCS | Performed by: FAMILY MEDICINE

## 2019-07-10 PROCEDURE — G0438 PPPS, INITIAL VISIT: HCPCS | Performed by: FAMILY MEDICINE

## 2019-07-10 RX ORDER — MOMETASONE FUROATE 1 MG/G
CREAM TOPICAL
Qty: 1 TUBE | Refills: 5 | Status: SHIPPED | OUTPATIENT
Start: 2019-07-10 | End: 2020-08-24

## 2019-07-10 ASSESSMENT — PATIENT HEALTH QUESTIONNAIRE - PHQ9
SUM OF ALL RESPONSES TO PHQ QUESTIONS 1-9: 0
SUM OF ALL RESPONSES TO PHQ QUESTIONS 1-9: 0

## 2019-07-10 NOTE — PROGRESS NOTES
(AWV)  02/08/1999    Flu vaccine (1) 09/01/2019    Potassium monitoring  01/15/2020    Creatinine monitoring  01/15/2020    DEXA (modify frequency per FRAX score)  Completed    Pneumococcal 65+ years Vaccine  Completed     Recommendations for Preventive Services Due: see orders and patient instructions/AVS.    Recommended screening schedule for the next 5-10 years is provided to the patient in written form: see Patient Instructions/AVS.      Advance Care Planning   Advanced Care Planning: Discussed the patients choices for care and treatment in case of a health event that adversely affects decision-making abilities. Also discussed the patients long-term treatment options. Reviewed with the patient the [address] [place] [address] Declaration forms  Reviewed the process of designating a competent adult as an Agent (or -in-fact) that could take make health care decisions for the patient if incompetent. Patient was asked to complete the declaration forms, either acknowledge the forms by a public notary or an eligible witness and provide a signed copy to the practice office.   Time spent (minutes): 5

## 2019-09-18 ENCOUNTER — OFFICE VISIT (OUTPATIENT)
Dept: FAMILY MEDICINE CLINIC | Age: 84
End: 2019-09-18
Payer: MEDICARE

## 2019-09-18 VITALS
SYSTOLIC BLOOD PRESSURE: 126 MMHG | DIASTOLIC BLOOD PRESSURE: 68 MMHG | RESPIRATION RATE: 15 BRPM | HEIGHT: 66 IN | HEART RATE: 68 BPM | WEIGHT: 149 LBS | TEMPERATURE: 98 F | OXYGEN SATURATION: 98 % | BODY MASS INDEX: 23.95 KG/M2

## 2019-09-18 DIAGNOSIS — D49.2 NEOPLASM OF UNSPECIFIED BEHAVIOR OF BONE, SOFT TISSUE, AND SKIN: ICD-10-CM

## 2019-09-18 DIAGNOSIS — L98.9 SKIN LESION: Primary | ICD-10-CM

## 2019-09-18 PROCEDURE — 11102 TANGNTL BX SKIN SINGLE LES: CPT | Performed by: FAMILY MEDICINE

## 2019-09-24 PROBLEM — Z85.828 HISTORY OF BASAL CELL CARCINOMA: Status: ACTIVE | Noted: 2019-09-24

## 2019-09-26 ENCOUNTER — PROCEDURE VISIT (OUTPATIENT)
Dept: SURGERY | Age: 84
End: 2019-09-26
Payer: MEDICARE

## 2019-09-26 ENCOUNTER — OFFICE VISIT (OUTPATIENT)
Dept: VASCULAR SURGERY | Age: 84
End: 2019-09-26
Payer: MEDICARE

## 2019-09-26 VITALS
HEIGHT: 66 IN | BODY MASS INDEX: 23.95 KG/M2 | DIASTOLIC BLOOD PRESSURE: 76 MMHG | SYSTOLIC BLOOD PRESSURE: 124 MMHG | WEIGHT: 149 LBS

## 2019-09-26 DIAGNOSIS — I65.21 CAROTID STENOSIS, ASYMPTOMATIC, RIGHT: Primary | ICD-10-CM

## 2019-09-26 DIAGNOSIS — I65.23 BILATERAL CAROTID ARTERY STENOSIS: Primary | ICD-10-CM

## 2019-09-26 PROCEDURE — G8399 PT W/DXA RESULTS DOCUMENT: HCPCS | Performed by: SURGERY

## 2019-09-26 PROCEDURE — 99214 OFFICE O/P EST MOD 30 MIN: CPT | Performed by: SURGERY

## 2019-09-26 PROCEDURE — G8598 ASA/ANTIPLAT THER USED: HCPCS | Performed by: SURGERY

## 2019-09-26 PROCEDURE — 1090F PRES/ABSN URINE INCON ASSESS: CPT | Performed by: SURGERY

## 2019-09-26 PROCEDURE — 1036F TOBACCO NON-USER: CPT | Performed by: SURGERY

## 2019-09-26 PROCEDURE — 4040F PNEUMOC VAC/ADMIN/RCVD: CPT | Performed by: SURGERY

## 2019-09-26 PROCEDURE — G8427 DOCREV CUR MEDS BY ELIG CLIN: HCPCS | Performed by: SURGERY

## 2019-09-26 PROCEDURE — G8420 CALC BMI NORM PARAMETERS: HCPCS | Performed by: SURGERY

## 2019-09-26 PROCEDURE — 93880 EXTRACRANIAL BILAT STUDY: CPT | Performed by: SURGERY

## 2019-09-26 PROCEDURE — 1123F ACP DISCUSS/DSCN MKR DOCD: CPT | Performed by: SURGERY

## 2019-09-30 RX ORDER — METOPROLOL TARTRATE 50 MG/1
TABLET, FILM COATED ORAL
Qty: 60 TABLET | Refills: 4 | Status: SHIPPED | OUTPATIENT
Start: 2019-09-30 | End: 2020-04-20

## 2019-11-18 DIAGNOSIS — E78.2 MIXED HYPERLIPIDEMIA: ICD-10-CM

## 2019-11-18 RX ORDER — ATORVASTATIN CALCIUM 10 MG/1
TABLET, FILM COATED ORAL
Qty: 90 TABLET | Refills: 1 | Status: SHIPPED | OUTPATIENT
Start: 2019-11-18 | End: 2020-05-19

## 2019-12-14 DIAGNOSIS — E78.2 MIXED HYPERLIPIDEMIA: Primary | ICD-10-CM

## 2019-12-14 DIAGNOSIS — I10 ESSENTIAL HYPERTENSION: ICD-10-CM

## 2019-12-16 RX ORDER — VALSARTAN 160 MG/1
TABLET ORAL
Qty: 90 TABLET | Refills: 0 | Status: SHIPPED | OUTPATIENT
Start: 2019-12-16 | End: 2020-03-09

## 2020-01-14 ENCOUNTER — HOSPITAL ENCOUNTER (OUTPATIENT)
Dept: WOMENS IMAGING | Age: 85
Discharge: HOME | End: 2020-01-14
Payer: MEDICARE

## 2020-01-14 PROCEDURE — 77063 BREAST TOMOSYNTHESIS BI: CPT

## 2020-03-09 RX ORDER — VALSARTAN 160 MG/1
TABLET ORAL
Qty: 90 TABLET | Refills: 0 | Status: SHIPPED | OUTPATIENT
Start: 2020-03-09 | End: 2020-05-27

## 2020-04-20 RX ORDER — METOPROLOL TARTRATE 50 MG/1
TABLET, FILM COATED ORAL
Qty: 180 TABLET | Refills: 3 | Status: SHIPPED | OUTPATIENT
Start: 2020-04-20 | End: 2021-09-13 | Stop reason: SDUPTHER

## 2020-05-16 ENCOUNTER — APPOINTMENT (OUTPATIENT)
Dept: CT IMAGING | Age: 85
End: 2020-05-16
Payer: MEDICARE

## 2020-05-16 ENCOUNTER — HOSPITAL ENCOUNTER (EMERGENCY)
Age: 85
Discharge: HOME | End: 2020-05-16
Payer: MEDICARE

## 2020-05-16 VITALS
OXYGEN SATURATION: 96 % | WEIGHT: 148.37 LBS | BODY MASS INDEX: 24.72 KG/M2 | DIASTOLIC BLOOD PRESSURE: 66 MMHG | SYSTOLIC BLOOD PRESSURE: 152 MMHG | HEIGHT: 65 IN | RESPIRATION RATE: 16 BRPM | TEMPERATURE: 97.8 F | HEART RATE: 68 BPM

## 2020-05-16 PROCEDURE — 99283 EMERGENCY DEPT VISIT LOW MDM: CPT

## 2020-05-16 PROCEDURE — 71250 CT THORAX DX C-: CPT

## 2020-05-16 RX ORDER — LIDOCAINE 50 MG/G
1 PATCH TOPICAL DAILY
Qty: 30 PATCH | Refills: 0 | Status: SHIPPED | OUTPATIENT
Start: 2020-05-16 | End: 2022-04-04 | Stop reason: ALTCHOICE

## 2020-05-16 ASSESSMENT — PAIN SCALES - GENERAL: PAINLEVEL_OUTOF10: 7

## 2020-05-16 ASSESSMENT — PAIN DESCRIPTION - LOCATION: LOCATION: RIB CAGE

## 2020-05-16 ASSESSMENT — PAIN DESCRIPTION - FREQUENCY: FREQUENCY: INTERMITTENT

## 2020-05-16 ASSESSMENT — ENCOUNTER SYMPTOMS: RESPIRATORY NEGATIVE: 1

## 2020-05-16 ASSESSMENT — PAIN DESCRIPTION - DESCRIPTORS: DESCRIPTORS: ACHING;THROBBING;CONSTANT;SHARP

## 2020-05-16 ASSESSMENT — PAIN - FUNCTIONAL ASSESSMENT: PAIN_FUNCTIONAL_ASSESSMENT: PREVENTS OR INTERFERES SOME ACTIVE ACTIVITIES AND ADLS

## 2020-05-16 ASSESSMENT — PAIN DESCRIPTION - PAIN TYPE: TYPE: ACUTE PAIN

## 2020-05-16 ASSESSMENT — PAIN DESCRIPTION - ORIENTATION: ORIENTATION: LEFT

## 2020-05-16 ASSESSMENT — PAIN DESCRIPTION - PROGRESSION: CLINICAL_PROGRESSION: NOT CHANGED

## 2020-05-16 NOTE — ED NOTES
Patient provided with warm blanket at this time. Pt resting in bed. No other wants or needs at this time. Call light within reach. Will continue to monitor.       Elaine Meek RN  05/16/20 [id number]

## 2020-05-16 NOTE — ED PROVIDER NOTES
Ultrasound and MRI are read by the radiologist. Plain radiographic images are visualized and preliminarily interpreted by ADONAY London CNP with the below findings:        Interpretation per the Radiologist below, if available at the time of this note:    CT CHEST [address]   Final Result   Minimally displaced fractures are seen involving the left-sided ribs   laterally, including the 9th, 8th and 7th ribs. There is early callus   formation. A few scattered micro nodules are present, not significantly changed. Nonobstructing left intrarenal stones are noted. LABS:  Labs Reviewed - No data to display    All other labs were within normal range or not returned as of this dictation. EMERGENCY DEPARTMENT COURSE and DIFFERENTIAL DIAGNOSIS/MDM:   Vitals:    Vitals:    05/16/20 1258   BP: (!) 152/66   Pulse: 68   Resp: 16   Temp: 97.8 °F (36.6 °C)   TempSrc: Oral   SpO2: 96%   Weight: 148 lb 5.9 oz (67.3 kg)   Height: 5' 5\" (1.651 m)       MDM    Patient was seen and evaluated per myself. Dr. Jaime Strong was present available for consultation as needed. Differential diagnoses: Costosternal strain, rib fractures be anterior or posterior. May be single or multiple, cutaneous nerve and/or rib contusion. CT of her chest will be obtained as I am concerned for subtle rib fractures that may not be visible on plain chest x-ray. CT of her chest indicates multiple close left-sided rib fractures #7 #8 #9. No evidence of hemopneumothorax. Rib fractures are minimally displaced. There is evidence of early callus formation. Patient is independent healthy-appearing for her age. I feel that she can be safely discharged home. There is no evidence of a flail chest or respiratory distress. No evidence of tachycardia tachypnea or hypotension. She can be treated at home at this point in time because she is 3 weeks out with Tylenol.   I do not feel that she needs muscle relaxers but she may

## 2020-05-19 RX ORDER — ATORVASTATIN CALCIUM 10 MG/1
TABLET, FILM COATED ORAL
Qty: 90 TABLET | Refills: 0 | Status: SHIPPED | OUTPATIENT
Start: 2020-05-19 | End: 2020-08-24

## 2020-05-27 ENCOUNTER — VIRTUAL VISIT (OUTPATIENT)
Dept: FAMILY MEDICINE CLINIC | Age: 85
End: 2020-05-27
Payer: MEDICARE

## 2020-05-27 PROBLEM — S76.312A LEFT HAMSTRING MUSCLE STRAIN: Status: RESOLVED | Noted: 2017-12-05 | Resolved: 2020-05-27

## 2020-05-27 PROCEDURE — 99442 PR PHYS/QHP TELEPHONE EVALUATION 11-20 MIN: CPT | Performed by: FAMILY MEDICINE

## 2020-05-27 RX ORDER — VALSARTAN 160 MG/1
TABLET ORAL
Qty: 90 TABLET | Refills: 0 | Status: SHIPPED | OUTPATIENT
Start: 2020-05-27 | End: 2020-09-08

## 2020-05-27 ASSESSMENT — PATIENT HEALTH QUESTIONNAIRE - PHQ9
2. FEELING DOWN, DEPRESSED OR HOPELESS: 0
SUM OF ALL RESPONSES TO PHQ9 QUESTIONS 1 & 2: 0
1. LITTLE INTEREST OR PLEASURE IN DOING THINGS: 0
SUM OF ALL RESPONSES TO PHQ QUESTIONS 1-9: 0
SUM OF ALL RESPONSES TO PHQ QUESTIONS 1-9: 0

## 2020-06-12 DIAGNOSIS — E78.2 MIXED HYPERLIPIDEMIA: ICD-10-CM

## 2020-06-12 DIAGNOSIS — I10 ESSENTIAL HYPERTENSION: ICD-10-CM

## 2020-06-12 LAB
A/G RATIO: 1.5 (ref 1.1–2.2)
ALBUMIN SERPL-MCNC: 4 G/DL (ref 3.4–5)
ALP BLD-CCNC: 64 U/L (ref 40–129)
ALT SERPL-CCNC: 14 U/L (ref 10–40)
ANION GAP SERPL CALCULATED.3IONS-SCNC: 10 MMOL/L (ref 3–16)
AST SERPL-CCNC: 22 U/L (ref 15–37)
BILIRUB SERPL-MCNC: 0.4 MG/DL (ref 0–1)
BUN BLDV-MCNC: 18 MG/DL (ref 7–20)
CALCIUM SERPL-MCNC: 8.7 MG/DL (ref 8.3–10.6)
CHLORIDE BLD-SCNC: 105 MMOL/L (ref 99–110)
CHOLESTEROL, TOTAL: 128 MG/DL (ref 0–199)
CO2: 26 MMOL/L (ref 21–32)
CREAT SERPL-MCNC: 0.7 MG/DL (ref 0.6–1.2)
GFR AFRICAN AMERICAN: >60
GFR NON-AFRICAN AMERICAN: >60
GLOBULIN: 2.6 G/DL
GLUCOSE BLD-MCNC: 98 MG/DL (ref 70–99)
HDLC SERPL-MCNC: 61 MG/DL (ref 40–60)
LDL CHOLESTEROL CALCULATED: 54 MG/DL
POTASSIUM SERPL-SCNC: 4.4 MMOL/L (ref 3.5–5.1)
SODIUM BLD-SCNC: 141 MMOL/L (ref 136–145)
TOTAL PROTEIN: 6.6 G/DL (ref 6.4–8.2)
TRIGL SERPL-MCNC: 66 MG/DL (ref 0–150)
VLDLC SERPL CALC-MCNC: 13 MG/DL

## 2020-07-09 ENCOUNTER — OFFICE VISIT (OUTPATIENT)
Dept: ORTHOPEDIC SURGERY | Age: 85
End: 2020-07-09
Payer: MEDICARE

## 2020-07-09 VITALS — HEIGHT: 65 IN | WEIGHT: 147 LBS | TEMPERATURE: 96.6 F | BODY MASS INDEX: 24.49 KG/M2

## 2020-07-09 PROCEDURE — 1090F PRES/ABSN URINE INCON ASSESS: CPT | Performed by: ORTHOPAEDIC SURGERY

## 2020-07-09 PROCEDURE — G8420 CALC BMI NORM PARAMETERS: HCPCS | Performed by: ORTHOPAEDIC SURGERY

## 2020-07-09 PROCEDURE — 1123F ACP DISCUSS/DSCN MKR DOCD: CPT | Performed by: ORTHOPAEDIC SURGERY

## 2020-07-09 PROCEDURE — G8427 DOCREV CUR MEDS BY ELIG CLIN: HCPCS | Performed by: ORTHOPAEDIC SURGERY

## 2020-07-09 PROCEDURE — 1036F TOBACCO NON-USER: CPT | Performed by: ORTHOPAEDIC SURGERY

## 2020-07-09 PROCEDURE — 4040F PNEUMOC VAC/ADMIN/RCVD: CPT | Performed by: ORTHOPAEDIC SURGERY

## 2020-07-09 PROCEDURE — 20610 DRAIN/INJ JOINT/BURSA W/O US: CPT | Performed by: ORTHOPAEDIC SURGERY

## 2020-07-09 PROCEDURE — 99213 OFFICE O/P EST LOW 20 MIN: CPT | Performed by: ORTHOPAEDIC SURGERY

## 2020-07-09 PROCEDURE — G8399 PT W/DXA RESULTS DOCUMENT: HCPCS | Performed by: ORTHOPAEDIC SURGERY

## 2020-07-09 NOTE — PROGRESS NOTES
This dictation was done with [employer] dictation and may contain mechanical errors related to translation. Temperature 96.6 °F (35.9 °C), temperature source Temporal, height 5' 5\" (1.651 m), weight 147 lb (66.7 kg).     Kenalog:  NDC: T2644602  Lot Number: [id number]  Expiration Date: 6/21

## 2020-07-18 NOTE — PROGRESS NOTES
TABLET BY MOUTH TWICE A DAY Yes Jil Sam MD   mometasone (ELOCON) 0.1 % cream Apply topically daily PRN Yes Jil Sam MD   dicyclomine (BENTYL) 10 MG capsule Take 1 capsule by mouth 4 times daily Yes Jil Sam MD   sucralfate (CARAFATE) 1 GM tablet Take 1 g by mouth. BEFORE MEALS Yes Historical Provider, MD   lansoprazole (PREVACID) 30 MG capsule Take 30 mg by mouth daily. Yes Historical Provider, MD   aspirin 81 MG tablet Take 81 mg by mouth daily. Yes Historical Provider, MD   Misc Intestinal Radha Regulat (ALIGN) CAPS Take  by mouth daily. Yes Historical Provider, MD   Multiple Vitamin (MULTI-VITAMIN PO) Take  by mouth daily. Yes Historical Provider, MD   CALCIUM ACETATE by Does not apply route 2 times daily. Yes Historical Provider, MD   GLUCOSAMINE CHONDROITIN COMPLX PO Take  by mouth 2 times daily. Yes Historical Provider, MD     As above examination 72-year-old female oriented x3 temperature is 96.6. Examination of her back shows reduced range of motion no specific pain tenderness or instability. Motor exam quadriceps hamstrings hip abductors and abductors foot plantar dorsiflexors are intact 4-5 over 5. Station and perfusion are intact there is no numbness or deep tendon reflexes are 0 at the knee and 0 at the ankle of the left lower extremity. No other obvious cutaneous lesions lymphedema or cellulitic processes are noted is good range of motion and no significant pain with 5 degrees of flexion and internal and extra noted. No obvious signs of instability or deep sepsis are noted in the left hip joint. Patient has acute lateral trochanteric bursal pain to palpation. X-rays obtained today AP pelvis AP lateral of the left hip shows significant degenerative changes of the acetabular femoral joint bilaterally. Narrowing of the acetabular femoral joint space is seen bilaterally. Calcification off of the right origin of the hamstring is noted.   No other obvious fractures tumors or dislocations are seen on these x-rays. Impression 81-year-old female stable status post bilateral knee arthroplasties and left reverse total shoulder arthroplasty. Patient does have hip arthritis however her symptoms appear to be trochanteric bursal related. The left trochanteric bursa is injected with 3 cc of Marcaine and 40 mg of Kenalog. Plan we had a 15-minute face-to-face discussion with this patient of which greater than 50% of time was spent on counseling her about further care for joint issues. We discussed both conservative and surgical management of hip pain however at least at this point time it appears to be mostly trochanteric bursal in nature. Does not respond from the trochanteric injection then we would just that she undergo intra-articular hip injection. We will follow her up in 4 to 6 weeks or PRN.

## 2020-08-24 RX ORDER — MOMETASONE FUROATE 1 MG/G
CREAM TOPICAL
Qty: 2 TUBE | Refills: 2 | Status: SHIPPED | OUTPATIENT
Start: 2020-08-24 | End: 2021-10-29

## 2020-08-24 RX ORDER — ATORVASTATIN CALCIUM 10 MG/1
TABLET, FILM COATED ORAL
Qty: 90 TABLET | Refills: 0 | Status: SHIPPED | OUTPATIENT
Start: 2020-08-24 | End: 2020-11-20

## 2020-09-08 RX ORDER — VALSARTAN 160 MG/1
TABLET ORAL
Qty: 90 TABLET | Refills: 0 | Status: SHIPPED | OUTPATIENT
Start: 2020-09-08 | End: 2020-12-01

## 2020-11-20 RX ORDER — ATORVASTATIN CALCIUM 10 MG/1
TABLET, FILM COATED ORAL
Qty: 90 TABLET | Refills: 0 | Status: SHIPPED | OUTPATIENT
Start: 2020-11-20 | End: 2021-02-22

## 2020-12-01 RX ORDER — VALSARTAN 160 MG/1
TABLET ORAL
Qty: 90 TABLET | Refills: 0 | Status: SHIPPED | OUTPATIENT
Start: 2020-12-01 | End: 2021-03-05

## 2021-01-19 ENCOUNTER — IMMUNIZATION (OUTPATIENT)
Dept: PRIMARY CARE CLINIC | Age: 86
End: 2021-01-19
Payer: MEDICARE

## 2021-01-19 PROCEDURE — 0011A COVID-19, MODERNA VACCINE 100MCG/0.5ML DOSE: CPT | Performed by: FAMILY MEDICINE

## 2021-01-19 PROCEDURE — 91301 COVID-19, MODERNA VACCINE 100MCG/0.5ML DOSE: CPT | Performed by: FAMILY MEDICINE

## 2021-02-03 ENCOUNTER — HOSPITAL ENCOUNTER (OUTPATIENT)
Dept: WOMENS IMAGING | Age: 86
Discharge: HOME | End: 2021-02-03
Payer: MEDICARE

## 2021-02-03 DIAGNOSIS — Z12.31 VISIT FOR SCREENING MAMMOGRAM: ICD-10-CM

## 2021-02-03 PROCEDURE — 77063 BREAST TOMOSYNTHESIS BI: CPT

## 2021-02-14 ENCOUNTER — IMMUNIZATION (OUTPATIENT)
Dept: PRIMARY CARE CLINIC | Age: 86
End: 2021-02-14
Payer: MEDICARE

## 2021-02-14 PROCEDURE — 91301 COVID-19, MODERNA VACCINE 100MCG/0.5ML DOSE: CPT | Performed by: FAMILY MEDICINE

## 2021-02-14 PROCEDURE — 0012A COVID-19, MODERNA VACCINE 100MCG/0.5ML DOSE: CPT | Performed by: FAMILY MEDICINE

## 2021-02-21 DIAGNOSIS — E78.2 MIXED HYPERLIPIDEMIA: ICD-10-CM

## 2021-02-22 RX ORDER — ATORVASTATIN CALCIUM 10 MG/1
TABLET, FILM COATED ORAL
Qty: 90 TABLET | Refills: 0 | Status: SHIPPED | OUTPATIENT
Start: 2021-02-22 | End: 2021-06-01

## 2021-03-05 DIAGNOSIS — I10 ESSENTIAL HYPERTENSION: ICD-10-CM

## 2021-03-05 RX ORDER — VALSARTAN 160 MG/1
TABLET ORAL
Qty: 90 TABLET | Refills: 0 | Status: SHIPPED | OUTPATIENT
Start: 2021-03-05 | End: 2021-06-01

## 2021-03-29 ENCOUNTER — OFFICE VISIT (OUTPATIENT)
Dept: ORTHOPEDIC SURGERY | Age: 86
End: 2021-03-29
Payer: MEDICARE

## 2021-03-29 VITALS
SYSTOLIC BLOOD PRESSURE: 154 MMHG | HEIGHT: 65 IN | TEMPERATURE: 97.3 F | DIASTOLIC BLOOD PRESSURE: 78 MMHG | HEART RATE: 74 BPM | WEIGHT: 147 LBS | BODY MASS INDEX: 24.49 KG/M2

## 2021-03-29 DIAGNOSIS — S80.01XA PATELLAR CONTUSION, RIGHT, INITIAL ENCOUNTER: ICD-10-CM

## 2021-03-29 DIAGNOSIS — Z96.651 HISTORY OF TOTAL KNEE ARTHROPLASTY, RIGHT: Primary | ICD-10-CM

## 2021-03-29 DIAGNOSIS — Z96.652 HISTORY OF TOTAL KNEE ARTHROPLASTY, LEFT: ICD-10-CM

## 2021-03-29 PROCEDURE — G8484 FLU IMMUNIZE NO ADMIN: HCPCS | Performed by: ORTHOPAEDIC SURGERY

## 2021-03-29 PROCEDURE — 1123F ACP DISCUSS/DSCN MKR DOCD: CPT | Performed by: ORTHOPAEDIC SURGERY

## 2021-03-29 PROCEDURE — G8427 DOCREV CUR MEDS BY ELIG CLIN: HCPCS | Performed by: ORTHOPAEDIC SURGERY

## 2021-03-29 PROCEDURE — 4040F PNEUMOC VAC/ADMIN/RCVD: CPT | Performed by: ORTHOPAEDIC SURGERY

## 2021-03-29 PROCEDURE — 1090F PRES/ABSN URINE INCON ASSESS: CPT | Performed by: ORTHOPAEDIC SURGERY

## 2021-03-29 PROCEDURE — 99213 OFFICE O/P EST LOW 20 MIN: CPT | Performed by: ORTHOPAEDIC SURGERY

## 2021-03-29 PROCEDURE — G8399 PT W/DXA RESULTS DOCUMENT: HCPCS | Performed by: ORTHOPAEDIC SURGERY

## 2021-03-29 PROCEDURE — G8420 CALC BMI NORM PARAMETERS: HCPCS | Performed by: ORTHOPAEDIC SURGERY

## 2021-03-29 PROCEDURE — 1036F TOBACCO NON-USER: CPT | Performed by: ORTHOPAEDIC SURGERY

## 2021-03-29 RX ORDER — METHYLPREDNISOLONE 4 MG/1
TABLET ORAL
Qty: 1 KIT | Refills: 0 | Status: SHIPPED | OUTPATIENT
Start: 2021-03-29 | End: 2022-02-14

## 2021-03-29 RX ORDER — AMOXICILLIN 500 MG/1
2000 CAPSULE ORAL ONCE
Qty: 4 CAPSULE | Refills: 0 | Status: SHIPPED | OUTPATIENT
Start: 2021-03-29 | End: 2021-03-29

## 2021-03-29 NOTE — PROGRESS NOTES
[facility]  [facility] Visit    Chief Complaint: Follow-up s/p bilateral total knee arthroplasty, acute right knee pain after a fall    HPI:  Erasto Rogers is a 81 y.o. who is here in follow-up of bilateral total knee arthroplasty performed. Right knee was done in 2012 and left knee in 2014 both by Dr. Vicente River. The knees have been doing well overall. However, last week she fell directly on her right knee and now has anterior knee pain on the side. She still been to walk without assistive device but has pain on a daily basis. She is not had this type of issue before. She denies any other areas of pain. Ibuprofen mildly helps with the pain. 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 Maris Jorge GERD (gastroesophageal reflux disease)    Mixed hyperlipidemia    Osteopenia of multiple sites - DEXA 1/2019    History of basal cell carcinoma - back of neck excised 2019       ROS:  Constitutional: denies fever, chills, weight loss  MSK: denies pain in other joints, muscle aches  Neurological: denies numbness, tingling, weakness    Exam:  Blood pressure (!) 154/78, pulse 74, temperature 97.3 °F (36.3 °C), temperature source Temporal, height 5' 5\" (1.651 m), weight 147 lb (66.7 kg). Appearance: sitting in exam room chair, appears to be in no acute distress, awake and alert  Resp: unlabored breathing on room air  Skin: warm, dry and intact with out erythema or significant increased temperature  Neuro: grossly intact both lower extremities. Intact sensation to light touch. Motor exam 4+ to 5/5 in all major motor groups. Right knee: Incision is healed. Tender anteriorly over the patella.   Range of motion is 0 to 130 degrees. The knee is stable to varus and valgus stress and stable to anterior and posterior drawer sign. Sensation is intact light touch. There is brisk capillary refill. Dorsalis pedis and posterior tibial pulses are intact. There is 5/5 muscle strength in all muscle groups. Left knee: Incision is healed. Range of motion is 0 to 130 degrees. The knee is stable to varus and valgus stress and stable to anterior and posterior drawer sign. Sensation is intact light touch. There is brisk capillary refill. Dorsalis pedis and posterior tibial pulses are intact. There is 5/5 muscle strength in all muscle groups. Imaging:  3 views of bilateral knees were performed and reviewed today. Significant for total knee arthroplasty prosthesis in place bilaterally with no signs of osteolysis, loosening, fracture, or dislocation. Assessment:  S/p bilateral total knee arthroplasty  Right patellar contusion    Plan:  We discussed the diagnosis and treatment options. Clinically and radiographically her knee replacement is stable on the right side. She has a patella bony contusion. I discussed that this will heal over time. A Medrol Dosepak was prescribed to help with the pain over this upcoming week. We discussed with the patient today the use of antibiotic prophylaxis prior to any dental procedures. We discussed that the antibiotic prophylaxis would be used to help prevent periprosthetic joint infections. If they were not offered antibiotics by the physician performing the procedure, they should contact our office that we may prescribe them antibiotics. Amoxicillin was provided for an upcoming dental appointment. Follow-up on an annual basis. Total time spent on today's encounter was at least 24 minutes.  This time included reviewing prior notes, radiographs, and lab results when available, reviewing history obtained by medical assistant, performing history and physical exam, reviewing tests/radiographs with the patient, counseling the patient, ordering medications or tests, documentation in the electronic health record, and coordination of care. This dictation was done with Dragon dictation and may contain mechanical errors related to translation.

## 2021-04-02 ENCOUNTER — APPOINTMENT (OUTPATIENT)
Dept: CT IMAGING | Age: 86
End: 2021-04-02
Payer: MEDICARE

## 2021-04-02 ENCOUNTER — HOSPITAL ENCOUNTER (EMERGENCY)
Age: 86
Discharge: HOME | End: 2021-04-02
Payer: MEDICARE

## 2021-04-02 ENCOUNTER — APPOINTMENT (OUTPATIENT)
Dept: GENERAL RADIOLOGY | Age: 86
End: 2021-04-02
Payer: MEDICARE

## 2021-04-02 VITALS
HEART RATE: 68 BPM | SYSTOLIC BLOOD PRESSURE: 171 MMHG | DIASTOLIC BLOOD PRESSURE: 71 MMHG | WEIGHT: 148 LBS | RESPIRATION RATE: 16 BRPM | OXYGEN SATURATION: 97 % | BODY MASS INDEX: 23.78 KG/M2 | HEIGHT: 66 IN | TEMPERATURE: 98.3 F

## 2021-04-02 DIAGNOSIS — S01.81XA FACIAL LACERATION, INITIAL ENCOUNTER: ICD-10-CM

## 2021-04-02 DIAGNOSIS — S02.2XXA CLOSED FRACTURE OF NASAL BONE, INITIAL ENCOUNTER: ICD-10-CM
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
## 2021-04-02 PROCEDURE — 72125 CT NECK SPINE W/O DYE: CPT

## 2021-04-02 PROCEDURE — 70450 CT HEAD/BRAIN W/O DYE: CPT

## 2021-04-02 PROCEDURE — 99284 EMERGENCY DEPT VISIT MOD MDM: CPT

## 2021-04-02 PROCEDURE — 70486 CT MAXILLOFACIAL W/O DYE: CPT

## 2021-04-02 PROCEDURE — 6370000000 HC RX 637 (ALT 250 FOR IP): Performed by: PHYSICIAN ASSISTANT

## 2021-04-02 PROCEDURE — 73070 X-RAY EXAM OF ELBOW: CPT

## 2021-04-02 RX ORDER — TRAMADOL HYDROCHLORIDE 50 MG/1
50 TABLET ORAL ONCE
Status: COMPLETED | OUTPATIENT
Start: 2021-04-02 | End: 2021-04-02

## 2021-04-02 RX ORDER — ACETAMINOPHEN 325 MG/1
650 TABLET ORAL ONCE
Status: COMPLETED | OUTPATIENT
Start: 2021-04-02 | End: 2021-04-02

## 2021-04-02 RX ADMIN — TRAMADOL HYDROCHLORIDE 50 MG: 50 TABLET, FILM COATED ORAL at 18:23

## 2021-04-02 RX ADMIN — ACETAMINOPHEN 650 MG: 325 TABLET ORAL at 18:22

## 2021-04-02 ASSESSMENT — PAIN - FUNCTIONAL ASSESSMENT: PAIN_FUNCTIONAL_ASSESSMENT: 0-10

## 2021-04-02 ASSESSMENT — PAIN DESCRIPTION - ORIENTATION: ORIENTATION: ANTERIOR

## 2021-04-02 ASSESSMENT — PAIN DESCRIPTION - PROGRESSION: CLINICAL_PROGRESSION: GRADUALLY IMPROVING

## 2021-04-02 ASSESSMENT — PAIN DESCRIPTION - LOCATION: LOCATION: NOSE

## 2021-04-02 ASSESSMENT — PAIN DESCRIPTION - PAIN TYPE
TYPE: ACUTE PAIN
TYPE: ACUTE PAIN

## 2021-04-02 ASSESSMENT — PAIN SCALES - GENERAL
PAINLEVEL_OUTOF10: 8
PAINLEVEL_OUTOF10: 6

## 2021-04-02 NOTE — ED NOTES
Bed: [facility]  Expected date:   Expected time:   Means of arrival:   Comments:  #1     Linda Foreman RN  04/02/21 7835

## 2021-04-02 NOTE — ED PROVIDER NOTES
throat, trouble swallowing. Eyes:  Negative for pain or visual disturbance. Respiratory:  Negative for cough, shortness of breath, wheezing, stridor. Cardiovascular:  Negative for chest pain, palpitations, leg swelling. Gastrointestinal:  Negative for nausea, vomiting, abdominal pain, diarrhea, constipation, blood in stool. Genitourinary:  Negative for dysuria, hematuria, flank pain, pelvic pain, abnormal vaginal bleeding. Musculoskeletal: Positive for left elbow pain and abrasion. Negative for myalgias, neck pain or stiffness. Neurological: Positive for headache. Negative for dizziness, seizures, syncope, focal weakness, numbness. Except as noted above the remainder of the review of systems was reviewed and negative. PAST MEDICAL HISTORY         Diagnosis Date    Arthritis     Blood clots     High blood pressure     Hx of blood clots     lung    Nausea & vomiting     UTI (urinary tract infection) 7/29/14    Vascular disease        SURGICAL HISTORY           Procedure Laterality Date    CARPAL TUNNEL RELEASE      HYSTERECTOMY      NECK SURGERY      2 disc in neck    ROTATOR CUFF REPAIR      SHOULDER ARTHROPLASTY Left 07/29/2014    TOTAL KNEE ARTHROPLASTY Left 01/29/2014    left knee     TOTAL KNEE ARTHROPLASTY Right 11/16/2012    TUMOR REMOVAL      tumor on thyroid ovary and tube    VARICOSE VEIN SURGERY         CURRENT MEDICATIONS       Discharge Medication List as of 4/2/2021  6:47 PM      CONTINUE these medications which have NOT CHANGED    Details   methylPREDNISolone (MEDROL DOSEPACK) 4 MG tablet Take as directed on package. , Disp-1 kit, R-0Normal      valsartan (DIOVAN) 160 MG tablet TAKE ONE TABLET BY MOUTH DAILY, Disp-90 tablet, R-0Normal      atorvastatin (LIPITOR) 10 MG tablet TAKE ONE TABLET BY MOUTH DAILY, Disp-90 tablet, R-0Normal      mometasone (ELOCON) 0.1 % cream APPLY TOPICALLY DAILY AS NEEDED, Disp-2 Tube, R-2, Normal      lidocaine (LIDODERM) 5 % Place 1 patch onto the skin daily 12 hours on, 12 hours off., Disp-30 patch, R-0Print      metoprolol tartrate (LOPRESSOR) 50 MG tablet TAKE ONE TABLET BY MOUTH TWICE A DAY, Disp-180 tablet, R-3Normal      dicyclomine (BENTYL) 10 MG capsule Take 1 capsule by mouth 4 times daily, Disp-360 capsule, R-1Normal      sucralfate (CARAFATE) 1 GM tablet Take 1 g by mouth. BEFORE MEALSHistorical Med      lansoprazole (PREVACID) 30 MG capsule Take 30 mg by mouth daily. Historical Med      aspirin 81 MG tablet Take 81 mg by mouth daily. Historical Med      Misc Intestinal Radha Regulat (ALIGN) CAPS Take  by mouth daily. Historical Med      Multiple Vitamin (MULTI-VITAMIN PO) Take  by mouth daily. Historical Med      CALCIUM ACETATE by Does not apply route 2 times daily. Historical Med      GLUCOSAMINE CHONDROITIN COMPLX PO Take  by mouth 2 times daily. Historical Med             ALLERGIES     Morphine and related and Codeine    FAMILY HISTORY           Problem Relation Age of Onset    Arthritis Other     High Blood Pressure Other     Stroke Other      Family Status   Relation Name Status    Mother   at age 66    Father   at age 80    Other  (Not Specified)    Other  (Not Specified)    Other  (Not Specified)        SOCIAL HISTORY      reports that she has quit smoking. She smoked 1.00 pack per day. She has never used smokeless tobacco. She reports current alcohol use. She reports that she does not use drugs. PHYSICAL EXAM    (up to 7 for level 4, 8 or more for level 5)     ED Triage Vitals [21 1445]   BP Temp Temp Source Pulse Resp SpO2 Height Weight   (!) 171/71 98.3 °F (36.8 °C) Oral 68 16 97 % 5' 6\" (1.676 m) 148 lb (67.1 kg)       Constitutional:  Appearing well-developed and well-nourished. No distress. HENT:  Normocephalic. Swelling and ecchymosis noted throughout the nose, tender to palpation, with superficial laceration on the bridge of the nose.   There is some blood in the right nostril, not actively bleeding, no bleeding in the left nostril. Negative for evidence of septal hematoma. Periorbital bruising noted on the left, no significant swelling. Conjunctivae and EOM normal. PERRLA. Negative for raccoon eyes or maki sign. Negative for mastoid tenderness or swelling bilaterally. Neck:  Normal range of motion. No tracheal deviation. Cardiovascular:  Normal rate, regular rhythm, normal heart sounds, intact distal pulses. Pulmonary/Chest:  Effort and breath sounds normal. No respiratory distress, wheezes or rales. Abdominal:  Soft. Bowel sounds normal. No tenderness, distension, mass, rebound or guarding. Musculoskeletal: Negative for tenderness to palpation of the cervical spine, with good range of motion of the neck. Abrasion noted to the left elbow over the olecranon, with mild tenderness to palpation there, but good range of motion, no swelling. Neurological:  Alert and oriented to person, place, and time. No cranial nerve deficit observed. Skin:  Skin is warm and dry. Not diaphoretic. Psychiatric:  Normal mood, affect, behavior, judgment, thought content. DIAGNOSTIC RESULTS     RADIOLOGY:     Interpretation per the Radiologist below, if available at the time of this note:    XR ELBOW LEFT (2 VIEWS)   Final Result   No acute osseous abnormality. CT HEAD WO CONTRAST   Final Result   No acute intracranial abnormality. Acute comminuted nasal bone fracture with overlying soft tissue swelling and   lucencies. Soft tissue laceration possible. Anterior fusion C5-C7. No acute fracture or subluxation. CT FACIAL BONES WO CONTRAST   Final Result   No acute intracranial abnormality. Acute comminuted nasal bone fracture with overlying soft tissue swelling and   lucencies. Soft tissue laceration possible. Anterior fusion C5-C7. No acute fracture or subluxation.          CT CERVICAL SPINE WO CONTRAST   Final Result   No acute intracranial abnormality. Acute comminuted nasal bone fracture with overlying soft tissue swelling and   lucencies. Soft tissue laceration possible. Anterior fusion C5-C7. No acute fracture or subluxation. LABS:  Labs Reviewed - No data to display    All other labs were within normal range or not returned as of this dictation. EMERGENCY DEPARTMENT COURSE and DIFFERENTIAL DIAGNOSIS/MDM:   Vitals:    Vitals:    04/02/21 1445 04/02/21 1901   BP: (!) 171/71 (!) 171/71   Pulse: 68 68   Resp: 16 16   Temp: 98.3 °F (36.8 °C) 98.3 °F (36.8 °C)   TempSrc: Oral Oral   SpO2: 97%    Weight: 148 lb (67.1 kg)    Height: 5' 6\" (1.676 m)        The patient's condition in the ED was good, the patient was afebrile and nontoxic in appearance, and the patient's physical exam was unremarkable other than for the findings noted above. No loss of consciousness reported, no neurological deficits on exam.  X-ray of the elbow showed no acute findings. CT scans revealed a comminuted nasal bone fracture but no other acute abnormalities. No active bleeding on my examination, patient's wounds were cleaned in the ED, and superficial laceration of the nose was closed with Dermabond. There was no indication for hospitalization or further workup. Patient will be discharged with close head injury precautions and advised to follow-up with primary care as needed. The patient verbalized understanding and agreement with this plan of care. The patient was advised to return to the emergency department if symptoms should significantly worsen or if new and concerning symptoms should appear. I estimate there is LOW risk for SKULL FRACTURE, SUBARACHNOID HEMORRHAGE, INTRACRANIAL HEMORRHAGE, CERVICAL SPINE INJURY, SUBDURAL OR EPIDURAL HEMATOMA,  thus I consider the discharge disposition reasonable. PROCEDURES:  None    FINAL IMPRESSION      1. Fall in home, initial encounter    2. Closed fracture of nasal bone, initial encounter    3. Facial laceration, initial encounter          DISPOSITION/PLAN   DISPOSITION Decision To Discharge 04/02/2021 06:32:19 PM      PATIENT REFERRED TO:  MD Anita Peñaloza [place] [id number]  [phone]    Call   As needed, For follow-up care      DISCHARGE MEDICATIONS:  Discharge Medication List as of 4/2/2021  6:47 PM          (Please note that portions of this note were completed with a voice recognition program.  Efforts were made to edit the dictations but occasionally words are mis-transcribed.)    Morgan Sesay, [address] [place], [place]  04/02/21 0479

## 2021-06-26 DIAGNOSIS — E78.2 MIXED HYPERLIPIDEMIA: ICD-10-CM

## 2021-06-28 RX ORDER — ATORVASTATIN CALCIUM 10 MG/1
TABLET, FILM COATED ORAL
Qty: 30 TABLET | Refills: 1 | Status: SHIPPED | OUTPATIENT
Start: 2021-06-28 | End: 2021-09-02

## 2021-07-09 ENCOUNTER — NURSE TRIAGE (OUTPATIENT)
Dept: OTHER | Facility: CLINIC | Age: 86
End: 2021-07-09

## 2021-07-09 ENCOUNTER — OFFICE VISIT (OUTPATIENT)
Dept: FAMILY MEDICINE CLINIC | Age: 86
End: 2021-07-09
Payer: MEDICARE

## 2021-07-09 VITALS
BODY MASS INDEX: 23.3 KG/M2 | OXYGEN SATURATION: 96 % | HEIGHT: 66 IN | RESPIRATION RATE: 16 BRPM | SYSTOLIC BLOOD PRESSURE: 136 MMHG | TEMPERATURE: 98 F | HEART RATE: 71 BPM | WEIGHT: 145 LBS | DIASTOLIC BLOOD PRESSURE: 84 MMHG

## 2021-07-09 DIAGNOSIS — S81.812A SKIN TEAR OF LEFT LOWER LEG WITHOUT COMPLICATION, INITIAL ENCOUNTER: Primary | ICD-10-CM

## 2021-07-09 PROCEDURE — G8427 DOCREV CUR MEDS BY ELIG CLIN: HCPCS | Performed by: FAMILY MEDICINE

## 2021-07-09 PROCEDURE — 4040F PNEUMOC VAC/ADMIN/RCVD: CPT | Performed by: FAMILY MEDICINE

## 2021-07-09 PROCEDURE — G8420 CALC BMI NORM PARAMETERS: HCPCS | Performed by: FAMILY MEDICINE

## 2021-07-09 PROCEDURE — 99213 OFFICE O/P EST LOW 20 MIN: CPT | Performed by: FAMILY MEDICINE

## 2021-07-09 PROCEDURE — 1123F ACP DISCUSS/DSCN MKR DOCD: CPT | Performed by: FAMILY MEDICINE

## 2021-07-09 PROCEDURE — 1090F PRES/ABSN URINE INCON ASSESS: CPT | Performed by: FAMILY MEDICINE

## 2021-07-09 PROCEDURE — G8399 PT W/DXA RESULTS DOCUMENT: HCPCS | Performed by: FAMILY MEDICINE

## 2021-07-09 PROCEDURE — 1036F TOBACCO NON-USER: CPT | Performed by: FAMILY MEDICINE

## 2021-07-09 ASSESSMENT — PATIENT HEALTH QUESTIONNAIRE - PHQ9
SUM OF ALL RESPONSES TO PHQ QUESTIONS 1-9: 0
2. FEELING DOWN, DEPRESSED OR HOPELESS: 0
SUM OF ALL RESPONSES TO PHQ9 QUESTIONS 1 & 2: 0
1. LITTLE INTEREST OR PLEASURE IN DOING THINGS: 0
SUM OF ALL RESPONSES TO PHQ QUESTIONS 1-9: 0
SUM OF ALL RESPONSES TO PHQ QUESTIONS 1-9: 0

## 2021-07-09 NOTE — TELEPHONE ENCOUNTER
Received call from [facility] at [facility] with Red Flag Complaint. Brief description of triage: Non-healing wound on left lower leg with infection    Triage indicates for patient to see PCP today    Care advice provided, patient verbalizes understanding; denies any other questions or concerns; instructed to call back for any new or worsening symptoms. Writer provided warm transfer to Elayne Fothergill at [facility] for appointment scheduling. Attention Provider: Thank you for allowing me to participate in the care of your patient. The patient was connected to triage in response to information provided to the ECC. Please do not respond through this encounter as the response is not directed to a shared pool. Reason for Disposition   Red streak runs from the wound    Answer Assessment - Initial Assessment Questions  1. LOCATION: \"Where is the wound located? \"       Left lower leg above the ankle    2. WOUND APPEARANCE: \"What does the wound look like? \"       Red area below and above the area. Painful    3. SIZE: If redness is present, ask: \"What is the size of the red area? \" (Inches, centimeters, or compare to size of a coin)       Quarter size    4. SPREAD: \"What's changed in the last day? \"  \"Do you see any red streaks coming from the wound? \"      Red area has formed and its pain    5. ONSET: \"When did it start to look infected? \"       For 1 week    6. MECHANISM: \"How did the wound start, what was the cause? \"      Bumped against metal bed frame    7. PAIN: \"Is there any pain? \" If so, ask: \"How bad is the pain? \"   (Scale 1-10; or mild, moderate, severe)      Severe    8. FEVER: \"Do you have a fever? \" If so, ask: \"What is your temperature, how was it measured, and when did it start? \"      Denies    9. OTHER SYMPTOMS: \"Do you have any other symptoms? \" (e.g., shaking chills, weakness, rash elsewhere on body)      Painful to walk on    10. PREGNANCY: \"Is there any chance you are pregnant? \" \"When was your last menstrual period? \"        N/a    Protocols used: WOUND INFECTION-ADULT-[place]

## 2021-07-09 NOTE — TELEPHONE ENCOUNTER
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 Patient hit her leg a week ago, pain is getting worse. Where patient hit her leg redness around wound. When walking a lot ankle swells up   No red streaks only redness around wound. Per Enrique shelby message back   Can we schedule patient to be seen today ?    Please advise

## 2021-07-09 NOTE — PROGRESS NOTES
PROBLEM VISIT NOTE     Subjective:     Chief Complaint   Patient presents with    Leg Swelling     Lukasz Payne is a 81 y.o. female   who presents pt has a wound on her left lower leg and it isn't healing. It's sore and stings and is red around it. At night it gets really red and makes her ankle swell    Duration it's been there x 2 weeks   Tried use peroxide at first, then used soap and water, then used an antibiotic her  had after he had mrsa (bactroban?) and it's still not getting better. No itch. Does burn. Patient's medications, allergies, past medical, and social histories were reviewed and updated as appropriate (see below). Review of Systems   General ROS: fever? No,    Night sweats? No  Endocrine ROS:malaise/lethargy? No   Unexpected weight changes? No  Respiratory ROS: cough? No   Shortness of breath? No  Cardiovascular ROS:chest pain? No   Shortness of breath with exertion? No  Gastrointestinal ROS: abdominal pain? No   Change in stools? No  Genito-Urinary ROS: painful urination? No   Trouble voiding? No  Neurological ROS: TIA or stroke symptoms? No   Numbness/tingling in feet? No    Health Maintenance Due   Topic Date Due    DTaP/Tdap/Td vaccine (1 - Tdap) Never done    Shingles Vaccine (1 of 2) Never done   [employer] Visit (AWV)  Never done    Potassium monitoring  06/12/2021    Creatinine monitoring  06/12/2021    Lipid screen  06/12/2021         *Chief complaint, HPI and History provided by the medical assistant has been reviewed and verified by provider Tenisha Ji MD    HISTORY:  Patient's medications, allergies, past medical, and social histories were reviewed and updated as appropriate.      CHART REVIEW  Health Maintenance   Topic Date Due    DTaP/Tdap/Td vaccine (1 - Tdap) Never done    Shingles Vaccine (1 of 2) Never done   [employer] Visit (AWV)  Never done    Potassium monitoring  06/12/2021    Creatinine monitoring  06/12/2021    Lipid screen Family History   Problem Relation Age of Onset    Arthritis Other     High Blood Pressure Other     Stroke Other      Social History     Tobacco Use    Smoking status: Former Smoker     Packs/day: 1.00    Smokeless tobacco: Never Used   Vaping Use    Vaping Use: Never used   Substance Use Topics    Alcohol use: Yes     Comment: social    Drug use: No      LAST LABS  Cholesterol, Total   Date Value Ref Range Status   06/12/2020 128 0 - 199 mg/dL Final     LDL Calculated   Date Value Ref Range Status   06/12/2020 54 <100 mg/dL Final     HDL   Date Value Ref Range Status   06/12/2020 61 (H) 40 - 60 mg/dL Final   03/17/2010 55 40 - 60 mg/dl Final     Triglycerides   Date Value Ref Range Status   06/12/2020 66 0 - 150 mg/dL Final     Lab Results   Component Value Date    GLUCOSE 98 06/12/2020     Lab Results   Component Value Date     06/12/2020    K 4.4 06/12/2020    CREATININE 0.7 06/12/2020     Lab Results   Component Value Date    WBC 4.5 01/15/2019    HGB 14.1 01/15/2019    HCT 42.9 01/15/2019    MCV 94.7 01/15/2019     01/15/2019     Lab Results   Component Value Date    ALT 14 06/12/2020    AST 22 06/12/2020    ALKPHOS 64 06/12/2020    BILITOT 0.4 06/12/2020     No results found for: TSH  Lab Results   Component Value Date    LABA1C 5.7 07/22/2014     Objective:   PHYSICAL EXAM  /84 (Site: Left Upper Arm, Position: Sitting, Cuff Size: Medium Adult)   Pulse 71   Temp 98 °F (36.7 °C) (Oral)   Resp 16   Ht 5' 6\" (1.676 m)   Wt 145 lb (65.8 kg)   SpO2 96%   BMI 23.40 kg/m²   Wt Readings from Last 3 Encounters:   07/09/21 145 lb (65.8 kg)   04/02/21 148 lb (67.1 kg)   03/29/21 147 lb (66.7 kg)     BP Readings from Last 3 Encounters:   07/09/21 136/84   04/02/21 (!) 171/71   03/29/21 (!) 154/78     GENERAL: well-developed, well-nourished, alert, no distress  SKIN:  Rash located on LLE shin 3x3 cm. Dried scab with 1 cm surrounding new skin. Not hot.  No signs of infection Assessment/Plan:      Diagnosis Orders   1. Skin tear of left lower leg without complication, initial encounter  mupirocin (BACTROBAN) 2 % ointment   Plan as below. Please schedule check-up in 2 weeks. Mild soap and water every 1-2 days. Let dry an hour or so  Bactroban, non-stick gauze. Call for any signs of infection including redness, swelling or increasing pain.

## 2021-07-28 ENCOUNTER — OFFICE VISIT (OUTPATIENT)
Dept: FAMILY MEDICINE CLINIC | Age: 86
End: 2021-07-28
Payer: MEDICARE

## 2021-07-28 VITALS
HEART RATE: 70 BPM | RESPIRATION RATE: 12 BRPM | DIASTOLIC BLOOD PRESSURE: 86 MMHG | HEIGHT: 65 IN | OXYGEN SATURATION: 95 % | SYSTOLIC BLOOD PRESSURE: 134 MMHG | WEIGHT: 144 LBS | BODY MASS INDEX: 23.99 KG/M2 | TEMPERATURE: 98.1 F

## 2021-07-28 DIAGNOSIS — R19.7 DIARRHEA, UNSPECIFIED TYPE: ICD-10-CM

## 2021-07-28 DIAGNOSIS — K58.0 IRRITABLE BOWEL SYNDROME WITH DIARRHEA: ICD-10-CM

## 2021-07-28 DIAGNOSIS — I10 ESSENTIAL HYPERTENSION: ICD-10-CM

## 2021-07-28 DIAGNOSIS — Z91.81 AT HIGH RISK FOR FALLS: ICD-10-CM

## 2021-07-28 DIAGNOSIS — S81.802D WOUND OF LEFT LOWER EXTREMITY, SUBSEQUENT ENCOUNTER: Primary | ICD-10-CM

## 2021-07-28 PROCEDURE — 4040F PNEUMOC VAC/ADMIN/RCVD: CPT | Performed by: FAMILY MEDICINE

## 2021-07-28 PROCEDURE — 1123F ACP DISCUSS/DSCN MKR DOCD: CPT | Performed by: FAMILY MEDICINE

## 2021-07-28 PROCEDURE — G8420 CALC BMI NORM PARAMETERS: HCPCS | Performed by: FAMILY MEDICINE

## 2021-07-28 PROCEDURE — 1090F PRES/ABSN URINE INCON ASSESS: CPT | Performed by: FAMILY MEDICINE

## 2021-07-28 PROCEDURE — G8399 PT W/DXA RESULTS DOCUMENT: HCPCS | Performed by: FAMILY MEDICINE

## 2021-07-28 PROCEDURE — G8427 DOCREV CUR MEDS BY ELIG CLIN: HCPCS | Performed by: FAMILY MEDICINE

## 2021-07-28 PROCEDURE — 1036F TOBACCO NON-USER: CPT | Performed by: FAMILY MEDICINE

## 2021-07-28 PROCEDURE — 99214 OFFICE O/P EST MOD 30 MIN: CPT | Performed by: FAMILY MEDICINE

## 2021-07-28 RX ORDER — SUCRALFATE 1 G/1
1 TABLET ORAL 4 TIMES DAILY
Qty: 360 TABLET | Refills: 1
Start: 2021-07-28 | End: 2022-04-13 | Stop reason: SDUPTHER

## 2021-07-28 RX ORDER — DICYCLOMINE HYDROCHLORIDE 10 MG/1
10 CAPSULE ORAL 4 TIMES DAILY
Qty: 360 CAPSULE | Refills: 1 | Status: SHIPPED | OUTPATIENT
Start: 2021-07-28 | End: 2022-10-27

## 2021-09-02 DIAGNOSIS — E78.2 MIXED HYPERLIPIDEMIA: ICD-10-CM

## 2021-09-02 RX ORDER — ATORVASTATIN CALCIUM 10 MG/1
TABLET, FILM COATED ORAL
Qty: 30 TABLET | Refills: 1 | Status: SHIPPED | OUTPATIENT
Start: 2021-09-02 | End: 2021-09-13 | Stop reason: SDUPTHER

## 2021-09-13 ENCOUNTER — OFFICE VISIT (OUTPATIENT)
Dept: FAMILY MEDICINE CLINIC | Age: 86
End: 2021-09-13
Payer: MEDICARE

## 2021-09-13 VITALS
BODY MASS INDEX: 24.32 KG/M2 | HEIGHT: 65 IN | OXYGEN SATURATION: 92 % | SYSTOLIC BLOOD PRESSURE: 138 MMHG | RESPIRATION RATE: 18 BRPM | DIASTOLIC BLOOD PRESSURE: 80 MMHG | WEIGHT: 146 LBS | HEART RATE: 78 BPM

## 2021-09-13 DIAGNOSIS — Z00.00 ROUTINE GENERAL MEDICAL EXAMINATION AT A HEALTH CARE FACILITY: Primary | ICD-10-CM

## 2021-09-13 DIAGNOSIS — I10 ESSENTIAL HYPERTENSION: ICD-10-CM

## 2021-09-13 DIAGNOSIS — E78.2 MIXED HYPERLIPIDEMIA: ICD-10-CM

## 2021-09-13 DIAGNOSIS — Z23 NEED FOR SHINGLES VACCINE: ICD-10-CM

## 2021-09-13 PROCEDURE — G0439 PPPS, SUBSEQ VISIT: HCPCS | Performed by: FAMILY MEDICINE

## 2021-09-13 PROCEDURE — 4040F PNEUMOC VAC/ADMIN/RCVD: CPT | Performed by: FAMILY MEDICINE

## 2021-09-13 PROCEDURE — 1123F ACP DISCUSS/DSCN MKR DOCD: CPT | Performed by: FAMILY MEDICINE

## 2021-09-13 RX ORDER — ZOSTER VACCINE RECOMBINANT, ADJUVANTED 50 MCG/0.5
0.5 KIT INTRAMUSCULAR SEE ADMIN INSTRUCTIONS
Qty: 0.5 ML | Refills: 0 | Status: SHIPPED | OUTPATIENT
Start: 2021-09-13 | End: 2022-02-14

## 2021-09-13 RX ORDER — VALSARTAN 160 MG/1
TABLET ORAL
Qty: 90 TABLET | Refills: 1 | Status: SHIPPED | OUTPATIENT
Start: 2021-09-13 | End: 2022-03-29 | Stop reason: SDUPTHER

## 2021-09-13 RX ORDER — METOPROLOL TARTRATE 50 MG/1
TABLET, FILM COATED ORAL
Qty: 180 TABLET | Refills: 1 | Status: SHIPPED | OUTPATIENT
Start: 2021-09-13 | End: 2022-10-18

## 2021-09-13 RX ORDER — ATORVASTATIN CALCIUM 10 MG/1
TABLET, FILM COATED ORAL
Qty: 90 TABLET | Refills: 1 | Status: SHIPPED | OUTPATIENT
Start: 2021-09-13 | End: 2022-04-08

## 2021-09-13 RX ORDER — VITAMIN B COMPLEX
TABLET ORAL DAILY
COMMUNITY

## 2021-09-13 RX ORDER — BIOTIN 10 MG
TABLET ORAL
COMMUNITY

## 2021-09-13 ASSESSMENT — LIFESTYLE VARIABLES
AUDIT-C TOTAL SCORE: 2
HOW OFTEN DURING THE LAST YEAR HAVE YOU FAILED TO DO WHAT WAS NORMALLY EXPECTED FROM YOU BECAUSE OF DRINKING: 0
HOW OFTEN DURING THE LAST YEAR HAVE YOU HAD A FEELING OF GUILT OR REMORSE AFTER DRINKING: 0
HOW OFTEN DO YOU HAVE A DRINK CONTAINING ALCOHOL: 2
HOW OFTEN DURING THE LAST YEAR HAVE YOU NEEDED AN ALCOHOLIC DRINK FIRST THING IN THE MORNING TO GET YOURSELF GOING AFTER A NIGHT OF HEAVY DRINKING: 0
HAVE YOU OR SOMEONE ELSE BEEN INJURED AS A RESULT OF YOUR DRINKING: 0
HOW MANY STANDARD DRINKS CONTAINING ALCOHOL DO YOU HAVE ON A TYPICAL DAY: 0
HAS A RELATIVE, FRIEND, DOCTOR, OR ANOTHER HEALTH PROFESSIONAL EXPRESSED CONCERN ABOUT YOUR DRINKING OR SUGGESTED YOU CUT DOWN: 0
HOW OFTEN DURING THE LAST YEAR HAVE YOU FOUND THAT YOU WERE NOT ABLE TO STOP DRINKING ONCE YOU HAD STARTED: 0
HOW OFTEN DO YOU HAVE SIX OR MORE DRINKS ON ONE OCCASION: 0
HOW OFTEN DURING THE LAST YEAR HAVE YOU BEEN UNABLE TO REMEMBER WHAT HAPPENED THE NIGHT BEFORE BECAUSE YOU HAD BEEN DRINKING: 0
AUDIT TOTAL SCORE: 2

## 2021-09-13 ASSESSMENT — PATIENT HEALTH QUESTIONNAIRE - PHQ9
SUM OF ALL RESPONSES TO PHQ QUESTIONS 1-9: 0
1. LITTLE INTEREST OR PLEASURE IN DOING THINGS: 0
SUM OF ALL RESPONSES TO PHQ QUESTIONS 1-9: 0
SUM OF ALL RESPONSES TO PHQ QUESTIONS 1-9: 0
2. FEELING DOWN, DEPRESSED OR HOPELESS: 0
SUM OF ALL RESPONSES TO PHQ9 QUESTIONS 1 & 2: 0

## 2021-09-13 NOTE — PATIENT INSTRUCTIONS
Personalized Preventive Plan for Kleber Mooneyr - 9/13/2021  Medicare offers a range of preventive health benefits. Some of the tests and screenings are paid in full while other may be subject to a deductible, co-insurance, and/or copay. Some of these benefits include a comprehensive review of your medical history including lifestyle, illnesses that may run in your family, and various assessments and screenings as appropriate. After reviewing your medical record and screening and assessments performed today your provider may have ordered immunizations, labs, imaging, and/or referrals for you. A list of these orders (if applicable) as well as your Preventive Care list are included within your After Visit Summary for your review. Other Preventive Recommendations:    · A preventive eye exam performed by an eye specialist is recommended every 1-2 years to screen for glaucoma; cataracts, macular degeneration, and other eye disorders. · A preventive dental visit is recommended every 6 months. · Try to get at least 150 minutes of exercise per week or 10,000 steps per day on a pedometer . · Order or download the FREE \"Exercise & Physical Activity: Your Everyday Guide\" from [employer]. Call 9-123.951.2860 or search [employer] online. · You need 5241-3592 mg of calcium and 4429-9787 IU of vitamin D per day. It is possible to meet your calcium requirement with diet alone, but a vitamin D supplement is usually necessary to meet this goal.  · When exposed to the sun, use a sunscreen that protects against both UVA and UVB radiation with an SPF of 30 or greater. Reapply every 2 to 3 hours or after sweating, drying off with a towel, or swimming. · Always wear a seat belt when traveling in a car. Always wear a helmet when riding a bicycle or motorcycle.

## 2021-09-13 NOTE — PROGRESS NOTES
Medicare Annual Wellness Visit  Name: Karan Solomon Date: 2021   MRN: [id number] Sex: Female   Age: 81 y.o. Ethnicity: Non- / Non    : 1935 Race: White (non-)      Kelsey Benavides is here for Medicare AWV (Pt is good. )    Screenings for behavioral, psychosocial and functional/safety risks, and cognitive dysfunction are all negative except as indicated below. These results, as well as other patient data from the [address] form, are documented in Flowsheets linked to this Encounter. Allergies   Allergen Reactions    Morphine And Related     Codeine Nausea And Vomiting         Prior to Visit Medications    Medication Sig Taking? Authorizing Provider   Multiple Vitamins-Minerals (MULTIVITAMIN ADULT) CHEW Take by mouth Yes Historical Provider, MD   Vitamin D (CHOLECALCIFEROL) 25 MCG (1000 UT) TABS tablet Take by mouth Yes Historical Provider, MD   valsartan (DIOVAN) 160 MG tablet TAKE ONE TABLET BY MOUTH DAILY Yes Amy Sam MD   atorvastatin (LIPITOR) 10 MG tablet TAKE ONE TABLET BY MOUTH DAILY Yes Amy Sam MD   metoprolol tartrate (LOPRESSOR) 50 MG tablet TAKE ONE TABLET BY MOUTH TWICE A DAY Yes Amy Sam MD   zoster recombinant adjuvanted vaccine [facility]) 50 MCG/0.5ML SUSR injection Inject 0.5 mLs into the muscle See Admin Instructions 1 dose now and repeat in 2-6 months Yes Amy Sam MD   dicyclomine (BENTYL) 10 MG capsule Take 1 capsule by mouth 4 times daily Yes Amy Sam MD   sucralfate (CARAFATE) 1 GM tablet Take 1 tablet by mouth 4 times daily BEFORE MEALS Yes Amy Sam MD   mupirocin (BACTROBAN) 2 % ointment Apply 3 times daily as needed Yes Roger Aranda MD   mometasone (ELOCON) 0.1 % cream APPLY TOPICALLY DAILY AS NEEDED Yes Amy Sam MD   lansoprazole (PREVACID) 30 MG capsule Take 30 mg by mouth daily. Yes Historical Provider, MD   aspirin 81 MG tablet Take 81 mg by mouth daily.  Yes Historical Provider, MD   Misc Intestinal Radha Regulat (ALIGN) CAPS Take  by mouth daily. Yes Historical Provider, MD   Multiple Vitamin (MULTI-VITAMIN PO) Take  by mouth daily. Yes Historical Provider, MD   CALCIUM ACETATE by Does not apply route 2 times daily. Yes Historical Provider, MD   GLUCOSAMINE CHONDROITIN COMPLX PO Take  by mouth 2 times daily. Yes Historical Provider, MD   methylPREDNISolone (MEDROL DOSEPACK) 4 MG tablet Take as directed on package. Patient not taking: Reported on 9/13/2021  Cari Connor MD   lidocaine (LIDODERM) 5 % Place 1 patch onto the skin daily 12 hours on, 12 hours off.   Patient not taking: Reported on 7/28/2021  Usha Reddy, APRN - CNP         Past Medical History:   Diagnosis Date    Arthritis     Blood clots     High blood pressure     Hx of blood clots     lung    Nausea & vomiting     UTI (urinary tract infection) 7/29/14    Vascular disease        Past Surgical History:   Procedure Laterality Date    CARPAL TUNNEL RELEASE      HYSTERECTOMY      NECK SURGERY      2 disc in neck    ROTATOR CUFF REPAIR      SHOULDER ARTHROPLASTY Left 07/29/2014    TOTAL KNEE ARTHROPLASTY Left 01/29/2014    left knee     TOTAL KNEE ARTHROPLASTY Right 11/16/2012    TUMOR REMOVAL      tumor on thyroid ovary and tube    VARICOSE VEIN SURGERY           Family History   Problem Relation Age of Onset    Arthritis Other     High Blood Pressure Other     Stroke Other        CareTeam (Including outside providers/suppliers regularly involved in providing care):   Patient Care Team:  Agusto Cardona MD as PCP - General (Family Medicine)  Agusto Cardona MD as PCP - [facility] Empaneled Provider    Wt Readings from Last 3 Encounters:   09/13/21 146 lb (66.2 kg)   07/28/21 144 lb (65.3 kg)   07/09/21 145 lb (65.8 kg)     Vitals:    09/13/21 1116   BP: 138/80   Site: Right Upper Arm   Position: Sitting   Cuff Size: Medium Adult   Pulse: 78   Resp: 18   SpO2: 92%   Weight: 146 lb (66.2 kg)   Height: 5' 5\" (1.651 m)     Body mass index is 24.3 kg/m². Based upon direct observation of the patient, evaluation of cognition reveals recent and remote memory intact. Patient's complete Health Risk Assessment and screening values have been reviewed and are found in Flowsheets. The following problems were reviewed today and where indicated follow up appointments were made and/or referrals ordered. Positive Risk Factor Screenings with Interventions:     Fall Risk:  2 or more falls in past year?: (!) yes  Fall with injury in past year?: (!) yes  Fall Risk Interventions:    · One fall was caused by her dog pulling her down  · The other fall was from a stepstool that was cracked  · No falls were without a significant mechanical cause          General Health and ACP:  General  In general, how would you say your health is?: Very Good  In the past 7 days, have you experienced any of the following? New or Increased Pain, New or Increased Fatigue, Loneliness, Social Isolation, Stress or Anger?: None of These  Do you get the social and emotional support that you need?: Yes  Do you have a Living Will?: Yes  Advance Directives     Power of [address] [place] [address] Will ACP-Advance Directive ACP-Power of     Not on File Not on File Not on File Not on File      General Health Risk Interventions:  · They have all the paperwork done. Will bring in to be scanned.  Updated primary and secondary decision makers      Safety:  Safety  Do you have working smoke detectors?: (!) No  Have all throw rugs been removed or fastened?: (!) No  Do you have non-slip mats or surfaces in all bathtubs/showers?: Yes  Do all of your stairways have a railing or banister?: (!) No  Are your doorways, halls and stairs free of clutter?: Yes  Do you always fasten your seatbelt when you are in a car?: Yes  Safety Interventions:  · We reviewed fall risks at home and ways to mitigate this     Personalized Preventive Plan   Current Health Maintenance Status  Immunization History   Administered Date(s) Administered    COVID-19, Moderna, PF, 100mcg/0.5mL 01/19/2021, 02/14/2021    Influenza, High Dose (Fluzone 65 yrs and older) 09/16/2019      BP Readings from Last 3 Encounters:   09/13/21 138/80   07/28/21 134/86   07/09/21 136/84     Health Maintenance   Topic Date Due    DTaP/Tdap/Td vaccine (1 - Tdap) Never done    Shingles Vaccine (1 of 2) Never done   [employer] Visit (AWV)  Never done    Lipid screen  06/12/2021    Potassium monitoring  06/12/2021    Creatinine monitoring  06/12/2021    Flu vaccine (1) 09/01/2021    DEXA (modify frequency per FRAX score)  Completed    Pneumococcal 65+ years Vaccine  Completed    COVID-19 Vaccine  Completed    Hepatitis A vaccine  Aged Out    Hepatitis B vaccine  Aged Out    Hib vaccine  Aged Out    Meningococcal (ACWY) vaccine  Aged Out     Recommendations for Soci[employer] Due: see orders and patient instructions/AVS.    Recommended screening schedule for the next 5-10 years is provided to the patient in written form: see Patient Instructions/AVS.    Tawny Lorenzo was seen today for medicare awv. Diagnoses and all orders for this visit:    Routine general medical examination at a health care facility    Essential hypertension  -     valsartan (DIOVAN) 160 MG tablet; TAKE ONE TABLET BY MOUTH DAILY  -     metoprolol tartrate (LOPRESSOR) 50 MG tablet; TAKE ONE TABLET BY MOUTH TWICE A DAY  -     Comprehensive Metabolic Panel; Future  -     CBC Auto Differential; Future    Mixed hyperlipidemia  -     atorvastatin (LIPITOR) 10 MG tablet; TAKE ONE TABLET BY MOUTH DAILY  -     Lipid Panel; Future    Need for shingles vaccine  -     zoster recombinant adjuvanted vaccine [facility]) 50 MCG/0.5ML SUSR injection; Inject 0.5 mLs into the muscle See Admin Instructions 1 dose now and repeat in 2-6 months    Check routine labs. Chronic conditions of hypertension and hyperlipidemia are stable. No new concerns today.

## 2021-10-29 RX ORDER — MOMETASONE FUROATE 1 MG/G
CREAM TOPICAL
Qty: 1 EACH | Refills: 5 | Status: SHIPPED | OUTPATIENT
Start: 2021-10-29 | End: 2022-10-27

## 2022-01-04 ENCOUNTER — TELEPHONE (OUTPATIENT)
Dept: FAMILY MEDICINE CLINIC | Age: 87
End: 2022-01-04

## 2022-01-04 DIAGNOSIS — R09.81 CONGESTION OF NASAL SINUS: Primary | ICD-10-CM

## 2022-01-04 NOTE — TELEPHONE ENCOUNTER
With symptoms and +exposure it would be ideal to be tested  Please ask if she is willing to get one and we can order it  Once that is back it will help us know how to best treat her symptoms - congestion for a week at this point continuing OTC meds especially mucinex would be best advice until we have results

## 2022-01-04 NOTE — TELEPHONE ENCOUNTER
Pt symptoms for a week today   Pt was exposed to someone with covid on 12/27/2021    Sinus drippage   Congestion in head   No fever  Verified pharmacy       Pt is taking  mucinex

## 2022-01-05 DIAGNOSIS — R09.81 CONGESTION OF NASAL SINUS: ICD-10-CM

## 2022-01-06 ENCOUNTER — TELEPHONE (OUTPATIENT)
Dept: FAMILY MEDICINE CLINIC | Age: 87
End: 2022-01-06

## 2022-01-06 LAB — SARS-COV-2: DETECTED

## 2022-01-06 RX ORDER — AMOXICILLIN AND CLAVULANATE POTASSIUM 875; 125 MG/1; MG/1
1 TABLET, FILM COATED ORAL 2 TIMES DAILY
Qty: 14 TABLET | Refills: 0 | Status: SHIPPED | OUTPATIENT
Start: 2022-01-06 | End: 2022-01-13

## 2022-02-11 ENCOUNTER — TELEPHONE (OUTPATIENT)
Dept: FAMILY MEDICINE CLINIC | Age: 87
End: 2022-02-11

## 2022-02-11 NOTE — TELEPHONE ENCOUNTER
Pt called in has a buzzing sound in her left ear pt is taking benadryl started a few days ago pt is going out of town Tuesday pt cannot get out of her driveway ENEDELIA         Please advise what the pt is to do

## 2022-02-14 ENCOUNTER — OFFICE VISIT (OUTPATIENT)
Dept: FAMILY MEDICINE CLINIC | Age: 87
End: 2022-02-14
Payer: MEDICARE

## 2022-02-14 VITALS
OXYGEN SATURATION: 97 % | HEART RATE: 80 BPM | RESPIRATION RATE: 14 BRPM | SYSTOLIC BLOOD PRESSURE: 128 MMHG | WEIGHT: 149 LBS | BODY MASS INDEX: 24.79 KG/M2 | DIASTOLIC BLOOD PRESSURE: 76 MMHG | TEMPERATURE: 98.5 F

## 2022-02-14 DIAGNOSIS — H69.82 DYSFUNCTION OF LEFT EUSTACHIAN TUBE: Primary | ICD-10-CM

## 2022-02-14 PROCEDURE — 99213 OFFICE O/P EST LOW 20 MIN: CPT | Performed by: NURSE PRACTITIONER

## 2022-02-14 PROCEDURE — 1036F TOBACCO NON-USER: CPT | Performed by: NURSE PRACTITIONER

## 2022-02-14 PROCEDURE — G8427 DOCREV CUR MEDS BY ELIG CLIN: HCPCS | Performed by: NURSE PRACTITIONER

## 2022-02-14 PROCEDURE — 4040F PNEUMOC VAC/ADMIN/RCVD: CPT | Performed by: NURSE PRACTITIONER

## 2022-02-14 PROCEDURE — 1090F PRES/ABSN URINE INCON ASSESS: CPT | Performed by: NURSE PRACTITIONER

## 2022-02-14 PROCEDURE — 1123F ACP DISCUSS/DSCN MKR DOCD: CPT | Performed by: NURSE PRACTITIONER

## 2022-02-14 PROCEDURE — G8484 FLU IMMUNIZE NO ADMIN: HCPCS | Performed by: NURSE PRACTITIONER

## 2022-02-14 PROCEDURE — G8420 CALC BMI NORM PARAMETERS: HCPCS | Performed by: NURSE PRACTITIONER

## 2022-02-14 RX ORDER — PREDNISONE 20 MG/1
20 TABLET ORAL 2 TIMES DAILY
Qty: 10 TABLET | Refills: 0 | Status: SHIPPED | OUTPATIENT
Start: 2022-02-14 | End: 2022-02-19

## 2022-02-14 RX ORDER — FLUTICASONE PROPIONATE 50 MCG
2 SPRAY, SUSPENSION (ML) NASAL DAILY
Qty: 16 G | Refills: 2 | Status: SHIPPED | OUTPATIENT
Start: 2022-02-14 | End: 2022-10-27

## 2022-02-14 ASSESSMENT — ENCOUNTER SYMPTOMS
SHORTNESS OF BREATH: 0
RHINORRHEA: 0
SORE THROAT: 0
DIARRHEA: 0
COUGH: 1
NAUSEA: 0
VOMITING: 0

## 2022-02-14 NOTE — PROGRESS NOTES
2/14/2022    This is a 81 y.o. female   Chief Complaint   Patient presents with    Other     Lt ear ringing for 5 days   . HPI  Patient reports that she has left ear ringing for the past 5 days. Was constant last week, but not it is coming and going. She reports that she continues with PND. Reports that she did have COVID-19 in Jan 2022. She then developed a sinus infection and was treated with augmentin 875 mg BID for 7 days on 1/6/22. Felt that her sinus symptoms imroved, but the drainage never resolved. Tried benadryl without improvement in symptoms. 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 Obie Rasheed GERD (gastroesophageal reflux disease)    Mixed hyperlipidemia    Osteopenia of multiple sites - DEXA 1/2019    History of basal cell carcinoma - back of neck excised 2019          Current Outpatient Medications   Medication Sig Dispense Refill    mometasone (ELOCON) 0.1 % cream APPLY TOPICALLY DAILY TO THE AFFECTED AREAS AS NEEDED 1 each 5    Multiple Vitamins-Minerals (MULTIVITAMIN ADULT) CHEW Take by mouth      Vitamin D (CHOLECALCIFEROL) 25 MCG (1000 UT) TABS tablet Take by mouth      valsartan (DIOVAN) 160 MG tablet TAKE ONE TABLET BY MOUTH DAILY 90 tablet 1    atorvastatin (LIPITOR) 10 MG tablet TAKE ONE TABLET BY MOUTH DAILY 90 tablet 1    metoprolol tartrate (LOPRESSOR) 50 MG tablet TAKE ONE TABLET BY MOUTH TWICE A  tablet 1    dicyclomine (BENTYL) 10 MG capsule Take 1 capsule by mouth 4 times daily 360 capsule 1    sucralfate (CARAFATE) 1 GM tablet Take 1 tablet by mouth 4 times daily BEFORE MEALS 360 tablet 1    lansoprazole (PREVACID) 30 MG capsule Take 30 mg by mouth daily.       aspirin 81 MG tablet Take 81 mg by mouth daily.  Misc Intestinal Radha Regulat (ALIGN) CAPS Take  by mouth daily.  Multiple Vitamin (MULTI-VITAMIN PO) Take  by mouth daily.  CALCIUM ACETATE by Does not apply route 2 times daily.  GLUCOSAMINE CHONDROITIN COMPLX PO Take  by mouth 2 times daily.  mupirocin (BACTROBAN) 2 % ointment Apply 3 times daily as needed (Patient not taking: Reported on 2/14/2022) 22 g 1    lidocaine (LIDODERM) 5 % Place 1 patch onto the skin daily 12 hours on, 12 hours off. (Patient not taking: Reported on 7/28/2021) 30 patch 0     No current facility-administered medications for this visit. Allergies   Allergen Reactions    Morphine And Related     Codeine Nausea And Vomiting       Review of Systems   Constitutional: Positive for activity change. Negative for fever. HENT: Positive for congestion, postnasal drip and tinnitus. Negative for rhinorrhea and sore throat. Respiratory: Positive for cough. Negative for shortness of breath. Slight cough with PND   Cardiovascular: Negative for chest pain. Gastrointestinal: Negative for diarrhea, nausea and vomiting. Neurological: Positive for headaches. Negative for dizziness. Slight sinus headache        Vitals:    02/14/22 1055   BP: 128/76   Site: Right Upper Arm   Position: Sitting   Pulse: 80   Resp: 14   Temp: 98.5 °F (36.9 °C)   SpO2: 97%   Weight: 149 lb (67.6 kg)       Body mass index is 24.79 kg/m². Wt Readings from Last 3 Encounters:   02/14/22 149 lb (67.6 kg)   09/13/21 146 lb (66.2 kg)   07/28/21 144 lb (65.3 kg)       BP Readings from Last 3 Encounters:   02/14/22 128/76   09/13/21 138/80   07/28/21 134/86       Physical Exam  Vitals and nursing note reviewed. Constitutional:       General: She is not in acute distress. Appearance: She is well-developed. HENT:      Head: Normocephalic and atraumatic.       Right Ear: Tympanic membrane, ear canal and external ear normal. Left Ear: Ear canal and external ear normal. Tympanic membrane is bulging. Tympanic membrane is not erythematous. Nose:      Right Sinus: No maxillary sinus tenderness or frontal sinus tenderness. Left Sinus: No maxillary sinus tenderness or frontal sinus tenderness. Mouth/Throat:      Pharynx: Oropharynx is clear. Uvula midline. No posterior oropharyngeal erythema. Cardiovascular:      Rate and Rhythm: Normal rate and regular rhythm. Heart sounds: Normal heart sounds. No murmur heard. No friction rub. No gallop. Pulmonary:      Effort: Pulmonary effort is normal. No respiratory distress. Breath sounds: Normal breath sounds. Musculoskeletal:      Cervical back: Neck supple. Lymphadenopathy:      Cervical: No cervical adenopathy. Skin:     General: Skin is warm and dry. Neurological:      Mental Status: She is alert and oriented to person, place, and time. Psychiatric:         Behavior: Behavior normal.         Thought Content: Thought content normal.         Judgment: Judgment normal.         Assessmentand Plan  Otis Newton was seen today. Diagnoses and all orders for this visit:    Dysfunction of left eustachian tube  -     fluticasone (FLONASE) 50 MCG/ACT nasal spray; 2 sprays by Nasal route daily  -     predniSONE (DELTASONE) 20 MG tablet; Take 1 tablet by mouth 2 times daily for 5 days  If symptoms do not improve with the above therapies, will need to see ENT for evaluation. Return if symptoms worsen or fail to improve.

## 2022-02-28 ENCOUNTER — HOSPITAL ENCOUNTER (OUTPATIENT)
Dept: WOMENS IMAGING | Age: 87
Discharge: HOME | End: 2022-02-28
Payer: MEDICARE

## 2022-02-28 DIAGNOSIS — Z12.31 VISIT FOR SCREENING MAMMOGRAM: ICD-10-CM

## 2022-02-28 PROCEDURE — 77063 BREAST TOMOSYNTHESIS BI: CPT

## 2022-03-01 ENCOUNTER — TELEPHONE (OUTPATIENT)
Dept: FAMILY MEDICINE CLINIC | Age: 87
End: 2022-03-01

## 2022-03-01 DIAGNOSIS — R92.8 ABNORMAL MAMMOGRAM OF RIGHT BREAST: Primary | ICD-10-CM

## 2022-03-01 NOTE — TELEPHONE ENCOUNTER
Please let Vinod Jordon know her mammogram showed a small abnormality - needing additional images.  These have been ordered to give us more information

## 2022-03-08 ENCOUNTER — TELEPHONE (OUTPATIENT)
Dept: FAMILY MEDICINE CLINIC | Age: 87
End: 2022-03-08

## 2022-03-08 NOTE — TELEPHONE ENCOUNTER
Pt calling stating that she has a spot on her neck that has been bothering her. Stated that she noticed it a few months ago. Stated that she has noticed that the spot is getting darker and peeling.  Stated that she would like to see a dermatologist.     Scheduled to see Dr. Jagruti Schmidt tomorrow at 10:40am

## 2022-03-09 ENCOUNTER — OFFICE VISIT (OUTPATIENT)
Dept: FAMILY MEDICINE CLINIC | Age: 87
End: 2022-03-09
Payer: MEDICARE

## 2022-03-09 VITALS
BODY MASS INDEX: 23.99 KG/M2 | WEIGHT: 144 LBS | OXYGEN SATURATION: 98 % | DIASTOLIC BLOOD PRESSURE: 74 MMHG | HEART RATE: 77 BPM | RESPIRATION RATE: 16 BRPM | HEIGHT: 65 IN | SYSTOLIC BLOOD PRESSURE: 118 MMHG

## 2022-03-09 DIAGNOSIS — L82.0 INFLAMED SEBORRHEIC KERATOSIS: Primary | ICD-10-CM

## 2022-03-09 PROCEDURE — 17110 DESTRUCTION B9 LES UP TO 14: CPT | Performed by: FAMILY MEDICINE

## 2022-03-09 NOTE — PROGRESS NOTES
2 times daily. No current facility-administered medications for this visit. /74 (Site: Right Upper Arm, Position: Sitting, Cuff Size: Medium Adult)   Pulse 77   Resp 16   Ht 5' 5\" (1.651 m)   Wt 144 lb (65.3 kg)   SpO2 98%   BMI 23.96 kg/m²     Physical Exam  Brown hyperpigmented raised plaque on chest wall    Cryotherapy  After informed consent was obtained, liquid nitrogen applied to skin lesion and patient tolerated well    Wt Readings from Last 3 Encounters:   03/09/22 144 lb (65.3 kg)   02/14/22 149 lb (67.6 kg)   09/13/21 146 lb (66.2 kg)     BP Readings from Last 3 Encounters:   03/09/22 118/74   02/14/22 128/76   09/13/21 138/80     Assessment/Plan:  1. Inflamed seborrheic keratosis  - 85627 - ID DESTRUCTION BENIGN LESIONS UP TO 14    Based on irritation due to location with necklace and clothing wear, cryotherapy applied today.

## 2022-03-22 ENCOUNTER — HOSPITAL ENCOUNTER (OUTPATIENT)
Dept: ULTRASOUND IMAGING | Age: 87
Discharge: HOME | End: 2022-03-22
Payer: MEDICARE

## 2022-03-22 ENCOUNTER — HOSPITAL ENCOUNTER (OUTPATIENT)
Dept: WOMENS IMAGING | Age: 87
Discharge: HOME | End: 2022-03-22
Payer: MEDICARE

## 2022-03-22 DIAGNOSIS — R92.8 ABNORMAL MAMMOGRAM OF RIGHT BREAST: ICD-10-CM

## 2022-03-22 PROCEDURE — 76642 ULTRASOUND BREAST LIMITED: CPT

## 2022-03-22 PROCEDURE — G0279 TOMOSYNTHESIS, MAMMO: HCPCS

## 2022-03-22 NOTE — PROGRESS NOTES
Dr. Annie Pacheco spoke to patient regarding recommendation for breast biopsy. RN and patient discussed medical history, allergies, and current medication list. Biopsy procedure explained to patient and printed education and instructions were provided as well. Patient denies any further questions. Requesting an order for biopsy from referring MD at this time. Biopsy order form, along with the radiologist's report, were faxed to doctor's office.

## 2022-03-24 ENCOUNTER — TELEPHONE (OUTPATIENT)
Dept: FAMILY MEDICINE CLINIC | Age: 87
End: 2022-03-24

## 2022-03-24 DIAGNOSIS — R92.8 ABNORMAL MAMMOGRAM: Primary | ICD-10-CM

## 2022-03-28 ENCOUNTER — HOSPITAL ENCOUNTER (OUTPATIENT)
Dept: WOMENS IMAGING | Age: 87
Discharge: HOME | End: 2022-03-28
Payer: MEDICARE

## 2022-03-28 ENCOUNTER — HOSPITAL ENCOUNTER (OUTPATIENT)
Dept: ULTRASOUND IMAGING | Age: 87
Discharge: HOME | End: 2022-03-28
Payer: MEDICARE

## 2022-03-28 VITALS — SYSTOLIC BLOOD PRESSURE: 136 MMHG | HEART RATE: 55 BPM | DIASTOLIC BLOOD PRESSURE: 55 MMHG | OXYGEN SATURATION: 95 %

## 2022-03-28 DIAGNOSIS — R92.8 ABNORMAL MAMMOGRAM: ICD-10-CM

## 2022-03-28 PROCEDURE — A4648 IMPLANTABLE TISSUE MARKER: HCPCS

## 2022-03-28 PROCEDURE — 88342 IMHCHEM/IMCYTCHM 1ST ANTB: CPT

## 2022-03-28 PROCEDURE — G0279 TOMOSYNTHESIS, MAMMO: HCPCS

## 2022-03-28 PROCEDURE — 88360 TUMOR IMMUNOHISTOCHEM/MANUAL: CPT

## 2022-03-28 PROCEDURE — 88341 IMHCHEM/IMCYTCHM EA ADD ANTB: CPT

## 2022-03-28 PROCEDURE — 88305 TISSUE EXAM BY PATHOLOGIST: CPT

## 2022-03-28 ASSESSMENT — PAIN SCALES - GENERAL
PAINLEVEL_OUTOF10: 0
PAINLEVEL_OUTOF10: 0

## 2022-03-28 NOTE — PROGRESS NOTES
Breast Biopsy Nursing Note    NAME:  Kojo Davalos  YOB: 1935 GENDER: female  MEDICAL RECORD NUMBER:  [id number]  DATE:  3/28/2022     Breast Biopsy completed by Andre Tyson.  Expiration date site marker checked immediately prior to use.  Package intact prior to use and no damage noted.  Site marker was removed from protective sterile packaging by physician.  Site marker was placed by physician into right     breast/s.  Hemostasis achieved via site pressure; Biopsy site cleansed with alcohol   Steri-strips applied along with small amount of bacitracin-neomycin polymixin then Coverderm    Breast Biopsy tissue was placed in proper container/s and labeled.  Breast Biopsy tissue was taken to Pathology for evaluation. Procedural Pain:  0  / 10     Post Procedural Pain:  0 / 10     Procedure well tolerated. Pt stable and alert and oriented x 3 upon discharge. Ice pack placed over biopsy site. Pt given post-biopsy education and all questions answered. Pt has NN contact info.        Electronically signed by Zuly Johnson RN on 3/28/2022 at 3:10 PM

## 2022-03-29 ENCOUNTER — TELEPHONE (OUTPATIENT)
Dept: FAMILY MEDICINE CLINIC | Age: 87
End: 2022-03-29

## 2022-03-29 DIAGNOSIS — I10 ESSENTIAL HYPERTENSION: ICD-10-CM

## 2022-03-29 DIAGNOSIS — Z17.0 MALIGNANT NEOPLASM OF RIGHT BREAST IN FEMALE, ESTROGEN RECEPTOR POSITIVE, UNSPECIFIED SITE OF BREAST (HCC): Primary | ICD-10-CM

## 2022-03-29 DIAGNOSIS — C50.911 MALIGNANT NEOPLASM OF RIGHT BREAST IN FEMALE, ESTROGEN RECEPTOR POSITIVE, UNSPECIFIED SITE OF BREAST (HCC): Primary | ICD-10-CM

## 2022-03-29 RX ORDER — VALSARTAN 160 MG/1
TABLET ORAL
Qty: 90 TABLET | Refills: 1 | Status: SHIPPED | OUTPATIENT
Start: 2022-03-29 | End: 2022-09-26

## 2022-03-29 NOTE — TELEPHONE ENCOUNTER
I called and discussed results with Leena Montana. Referrals placed to Oncology and our breast surgeon. Please call both to help set up appts for Leena Montana as soon as possible (preferably Oncology first) and let her know (she asked that we assist in doing that).   Thank you

## 2022-03-29 NOTE — TELEPHONE ENCOUNTER
Pt is scheduled for to see SRIDHAR [place] on April 5th @ 9:15   Breast surgeons office will call pt tomorrow because doctor was already gone for the day today   Called pt and relayed information   ENEDELIA

## 2022-03-30 ENCOUNTER — TELEPHONE (OUTPATIENT)
Dept: BREAST CENTER | Age: 87
End: 2022-03-30

## 2022-03-30 ENCOUNTER — OFFICE VISIT (OUTPATIENT)
Dept: BREAST CENTER | Age: 87
End: 2022-03-30
Payer: MEDICARE

## 2022-03-30 VITALS
HEART RATE: 77 BPM | BODY MASS INDEX: 23.63 KG/M2 | HEIGHT: 65 IN | SYSTOLIC BLOOD PRESSURE: 126 MMHG | WEIGHT: 141.8 LBS | DIASTOLIC BLOOD PRESSURE: 59 MMHG

## 2022-03-30 DIAGNOSIS — Z17.0 MALIGNANT NEOPLASM OF UPPER-OUTER QUADRANT OF RIGHT BREAST IN FEMALE, ESTROGEN RECEPTOR POSITIVE (HCC): Primary | ICD-10-CM

## 2022-03-30 DIAGNOSIS — C50.411 MALIGNANT NEOPLASM OF UPPER-OUTER QUADRANT OF RIGHT BREAST IN FEMALE, ESTROGEN RECEPTOR POSITIVE (HCC): Primary | ICD-10-CM

## 2022-03-30 PROCEDURE — G8427 DOCREV CUR MEDS BY ELIG CLIN: HCPCS | Performed by: SURGERY

## 2022-03-30 PROCEDURE — 1123F ACP DISCUSS/DSCN MKR DOCD: CPT | Performed by: SURGERY

## 2022-03-30 PROCEDURE — 99204 OFFICE O/P NEW MOD 45 MIN: CPT | Performed by: SURGERY

## 2022-03-30 PROCEDURE — 1036F TOBACCO NON-USER: CPT | Performed by: SURGERY

## 2022-03-30 PROCEDURE — 1090F PRES/ABSN URINE INCON ASSESS: CPT | Performed by: SURGERY

## 2022-03-30 PROCEDURE — G8484 FLU IMMUNIZE NO ADMIN: HCPCS | Performed by: SURGERY

## 2022-03-30 PROCEDURE — G8420 CALC BMI NORM PARAMETERS: HCPCS | Performed by: SURGERY

## 2022-03-30 PROCEDURE — 4040F PNEUMOC VAC/ADMIN/RCVD: CPT | Performed by: SURGERY

## 2022-03-30 NOTE — TELEPHONE ENCOUNTER
Breast History:  History of Previous Breast Biopsy:no  Self Breast Exams Completed:yes  Family History of Breast Cancer:yes-Sister 54 Alive 80  Sister 61 Alive 76   Family History of Other Cancers:no   Ashkenazi Confucianist Decent:no  Bra Size: 34B    Gyne History:  : 6,   Para: 4  Age of Menarche: 16 years  Age of Menopause: unknown had hysterectomy  Age of first live Birth: 33 years  History of Hysterectomy / NIURKA-BSO: 36years old  History of OCP's/HRT:No     Family History or personal history of Ovarian Cancer: no

## 2022-03-30 NOTE — PROGRESS NOTES
160 MG tablet TAKE ONE TABLET BY MOUTH DAILY 90 tablet 1    fluticasone (FLONASE) 50 MCG/ACT nasal spray 2 sprays by Nasal route daily 16 g 2    mometasone (ELOCON) 0.1 % cream APPLY TOPICALLY DAILY TO THE AFFECTED AREAS AS NEEDED 1 each 5    Multiple Vitamins-Minerals (MULTIVITAMIN ADULT) CHEW Take by mouth      Vitamin D (CHOLECALCIFEROL) 25 MCG (1000 UT) TABS tablet Take by mouth      atorvastatin (LIPITOR) 10 MG tablet TAKE ONE TABLET BY MOUTH DAILY 90 tablet 1    metoprolol tartrate (LOPRESSOR) 50 MG tablet TAKE ONE TABLET BY MOUTH TWICE A  tablet 1    dicyclomine (BENTYL) 10 MG capsule Take 1 capsule by mouth 4 times daily 360 capsule 1    sucralfate (CARAFATE) 1 GM tablet Take 1 tablet by mouth 4 times daily BEFORE MEALS 360 tablet 1    mupirocin (BACTROBAN) 2 % ointment Apply 3 times daily as needed 22 g 1    lidocaine (LIDODERM) 5 % Place 1 patch onto the skin daily 12 hours on, 12 hours off. 30 patch 0    lansoprazole (PREVACID) 30 MG capsule Take 30 mg by mouth daily.  aspirin 81 MG tablet Take 81 mg by mouth daily.  Misc Intestinal Radha Regulat (ALIGN) CAPS Take  by mouth daily.  Multiple Vitamin (MULTI-VITAMIN PO) Take  by mouth daily.  CALCIUM ACETATE by Does not apply route 2 times daily.  GLUCOSAMINE CHONDROITIN COMPLX PO Take  by mouth 2 times daily. No current facility-administered medications for this visit.        Social History     Socioeconomic History    Marital status:      Spouse name: Not on file    Number of children: Not on file    Years of education: Not on file    Highest education level: Not on file   Occupational History    Not on file   Tobacco Use    Smoking status: Former Smoker     Packs/day: 1.00    Smokeless tobacco: Never Used   Vaping Use    Vaping Use: Never used   Substance and Sexual Activity    Alcohol use: Yes     Comment: social    Drug use: No    Sexual activity: Not on file   Other Topics Concern    Not on file   Social History Narrative    Not on file     Social Determinants of Health     Financial Resource Strain:     Difficulty of Paying Living Expenses: Not on file   Food Insecurity:     Worried About Running Out of Food in the Last Year: Not on file    Heaven of Food in the Last Year: Not on file   Transportation Needs:     Lack of Transportation (Medical): Not on file    Lack of Transportation (Non-Medical): Not on file   Physical Activity:     Days of Exercise per Week: Not on file    Minutes of Exercise per Session: Not on file   Stress:     Feeling of Stress : Not on file   Social Connections:     Frequency of Communication with Friends and Family: Not on file    Frequency of Social Gatherings with Friends and Family: Not on file    Attends Adventism Services: Not on file    Active Member of [address] [place] [address] [employer] or Organizations: Not on file    Attends Club or Organization Meetings: Not on file    Marital Status: Not on file   Intimate Partner Violence:     Fear of Current or Ex-Partner: Not on file    Emotionally Abused: Not on file    Physically Abused: Not on file    Sexually Abused: Not on file   Housing Stability:     Unable to Pay for Housing in the Last Year: Not on file    Number of [place] in the Last Year: Not on file    Unstable Housing in the Last Year: Not on file       Objective:   Physical Exam    Right breast - mild ecchymosis post biopsy. 1 cm mass 9-9:30, 3 cmfn, mobile. Left breast - Normal contour, no masses, no nipple or skin changes. No cervical or axillary adenopathy. Assessment:      Diagnosis Orders   1. Malignant neoplasm of upper-outer quadrant of right breast in female, estrogen receptor positive ([facility] [address]            Plan:     Discussed breast biopsy results with patient. Reviewed surgical options, including lumpectomy, mastectomy, and mastectomy with reconstruction. Recommendations were made following standard NCCN guidelines.  She wishes to proceed with right breast lumpectomy for clinical T1N0 ductal carcinoma. We agreed not to perform sentinel node biopsy in light of her advanced age per [facility] guidelines. The indications for the planned procedure, along with the potential benefits and risks which include but are not limited to the risk of anesthesia, bleeding, infection, possible failed operation, possible need for additional surgery pending final pathologic assessment, nipple loss, lymphedema, sensation changes, nerve injury, persistent pain, and unappealing cosmetics were reviewed. The discussion I have done encompasses risks, benefits, and side effects related to the alternatives and the risks related to not receiving the proposed care, treatment, and services. All questions were answered and she agrees to proceed. On this date 03/30/22 I have spent 45 minutes reviewing previous notes, test results, and face to face with the patient discussing the diagnosis and importance of compliance with the treatment plan as well as documenting on the day of the visit.      Electronically signed by Bambi Eckert MD on 3/30/2022 at 11:54 AM

## 2022-03-30 NOTE — PATIENT INSTRUCTIONS
Mammogram results were discussed with patient today in office  Breast exam   Discussed surgery / risks / recovery / aftercare  Needs a Pre Op Exam ( Exam Form Given)  Surgery Date - April 21, 2022  Surgery Consent signed today      Healthy Lifestyle Recommendations: healthy diet (decrease consumption of red meat, increase fresh fruits and vegetables), decreased alcohol consumption (less than 4 drinks/week), adequate sleep (goal 6-8 hours), routine exercise (goal 150 minutes/week or greater), weight control.

## 2022-03-31 ENCOUNTER — CASE MANAGEMENT (OUTPATIENT)
Dept: WOMENS IMAGING | Age: 87
End: 2022-03-31

## 2022-03-31 NOTE — PROGRESS NOTES
RN spoke with patient to confirm her appt that she had with Dr. Balbir Causey yesterday (3/30/22). Pt stated that Dr. Ck Rogers had already relayed the information to Dr. Balbir Causey.

## 2022-04-04 RX ORDER — ASCORBIC ACID 500 MG
500 TABLET ORAL DAILY
COMMUNITY

## 2022-04-04 NOTE — PROGRESS NOTES
Zach Oates :  1935    DOS: 23    Pt to make appt with Dr. Nii Doherty should be in epic      H.P in [facility] for [facility]

## 2022-04-04 NOTE — PROGRESS NOTES
[id number] time_____0600_______        Surgery time____0730________    Take the following medications with a sip of water: Follow your MD/Surgeons pre-procedure instructions regarding your medications     Do not eat or drink anything after 12:00 midnight prior to your surgery. This includes water chewing gum, mints and ice chips. You may brush your teeth and gargle the morning of your surgery, but do not swallow the water     Please see your family doctor/pediatrician for a history and physical and/or concerning medications. Bring any test results/reports from your physicians office. If you are under the care of a heart doctor or specialist doctor, please be aware that you may be asked to them for clearance    You may be asked to stop blood thinners such as Coumadin, Plavix, Fragmin, Lovenox, etc., or any anti-inflammatories such as:  Aspirin, Ibuprofen, Advil, Naproxen prior to your surgery. We also ask that you stop any OTC medications such as fish oil, vitamin E, glucosamine, garlic, Multivitamins, COQ 10, etc.    We ask that you do not smoke 24 hours prior to surgery  We ask that you do not  drink any alcoholic beverages 24 hours prior to surgery     You must make arrangements for a responsible adult to take you home after your surgery. For your safety you will not be allowed to leave alone or drive yourself home. Your surgery will be cancelled if you do not have a ride home. Also for your safety, it is strongly suggested that someone stay with you the first 24 hours after your surgery. A parent or legal guardian must accompany a child scheduled for surgery and plan to stay at the hospital until the child is discharged. Please do not bring other children with you. For your comfort, please wear simple loose fitting clothing to the hospital.  Please do not bring valuables.     Do not wear any make-up or nail polish on your fingers or toes      For your safety, please do not wear any jewelry or body piercing's on the day of surgery. All jewelry must be removed. If you have dentures, they will be removed before going to operating room. For your convenience, we will provide you with a container. If you wear contact lenses or glasses, they will be removed, please bring a case for them. If you have a living will and a durable power of  for healthcare, please bring in a copy. As part of our patient safety program to minimize surgical site infections, we ask you to do the following:    · Please notify your surgeon if you develop any illness between         now and the  day of your surgery. · This includes a cough, cold, fever, sore throat, nausea,         or vomiting, and diarrhea, etc.  ·  Please notify your surgeon if you experience dizziness, shortness         of breath or blurred vision between now and the time of your surgery. Do not shave your operative site 96 hours prior to surgery. For face and neck surgery, men may use an electric razor 48 hours   prior to surgery. You may shower the night before surgery or the morning of   your surgery with an antibacterial soap. You will need to bring a photo ID and insurance card    [facility] has an onsite pharmacy, would you like to utilize our pharmacy     If you will be staying overnight and use a C-pap machine, please bring   your C-pap to hospital     Our goal is to provide you with excellent care, therefore, visitors will be limited to two(2) in the room at a time so that we may focus on providing this care for you. Please contact pre-admission testing if you have any further questions. [facility] phone number:  [phone] PAT fax number:  [phone]  Please note these are generalized instructions for all surgical cases, you may be provided with more specific instructions according to your surgery.     C-Difficile

## 2022-04-08 DIAGNOSIS — E78.2 MIXED HYPERLIPIDEMIA: ICD-10-CM

## 2022-04-08 RX ORDER — ATORVASTATIN CALCIUM 10 MG/1
TABLET, FILM COATED ORAL
Qty: 90 TABLET | Refills: 1 | Status: SHIPPED | OUTPATIENT
Start: 2022-04-08 | End: 2022-10-26

## 2022-04-11 ENCOUNTER — TELEPHONE (OUTPATIENT)
Dept: FAMILY MEDICINE CLINIC | Age: 87
End: 2022-04-11

## 2022-04-11 NOTE — TELEPHONE ENCOUNTER
Patient called in stating that last week she started vomiting & had diarrhea. She doesn't have much of an appetite and when she does eat it goes right through her. She did have a fever but that is gone and now she is very weak. She has been taking imodium but feels like it's not helping. She is worried because she has her Pre-op on April 13. She has stopped vomiting but she is still having diarrhea & stomach cramps.       Please Advise

## 2022-04-11 NOTE — TELEPHONE ENCOUNTER
Agree this is likely the viral GI bug we've been seeing. Happy to see her if needed, especially as we can examine her and get some blood work if needed. We also have dispatch health that can come to her home and give her some IV fluids if she is dehydrated  Let me know which option she is ok with. Thanks.

## 2022-04-11 NOTE — TELEPHONE ENCOUNTER
Called pt and she states this is going on day 5 and she doesn't think she can make it here without an accident. Pt would like dispatch [facility] to be used. Went to the website and set up a visit.

## 2022-04-11 NOTE — TELEPHONE ENCOUNTER
Got a call from dispatch health they said pt looked hydrated. They did take some blood and left a stool sample kit for her to test for c diff. Pt was drinking spite she suggested they switch to diet sprite. They will go back and check on pt tomorrow and see how she is doing and collect the sample if she leaves one.   ENEDELIA Subjective:      Patient ID: Lacey Ryder is a 47 y.o. female.    Chief Complaint: Discuss outside hearing test    Patient is a very pleasant 47 y.o. female here to see me today for the first time for evaluation of hearing loss.  Recently, she had a hearing screen at work, and was told that she did not pass in the low frequency in either ear.  This was the 1st screening she had in sometime, and the 1st time she did not pass.  She reports hearing loss that has been gradually progressing over the last 2 years.  She has not noted any difference in hearing between the ears, with either ear being the better hearing ear.  She has not noted any tinnitus in either ear.  She has not had any recent issues with ear pain or ear drainage.  She has a family history of hearing loss (mother had ehearing loss in one ear), and has not had any previous otologic surgery.  She denies any history of significant loud noise exposure.  She denies issues with dizziness.            Review of Systems   Constitutional: Negative for chills, fatigue, fever and unexpected weight change.   HENT: Positive for hearing loss. Negative for congestion, dental problem, ear discharge, ear pain, facial swelling, nosebleeds, postnasal drip, rhinorrhea, sinus pressure, sneezing, sore throat, tinnitus, trouble swallowing and voice change.    Eyes: Negative for redness, itching and visual disturbance.   Respiratory: Negative for cough, choking, shortness of breath and wheezing.    Cardiovascular: Negative for chest pain and palpitations.   Gastrointestinal: Negative for abdominal pain.        No reflux.   Musculoskeletal: Negative for gait problem.   Skin: Negative for rash.   Neurological: Negative for dizziness, light-headedness and headaches.       Objective:       Physical Exam   Constitutional: She is oriented to person, place, and time. She appears well-developed and well-nourished. No distress.   HENT:   Head: Normocephalic and atraumatic.    Right Ear: Tympanic membrane, external ear and ear canal normal.   Left Ear: Tympanic membrane, external ear and ear canal normal.   Nose: Nose normal. No mucosal edema, rhinorrhea, nasal deformity or septal deviation. No epistaxis. Right sinus exhibits no maxillary sinus tenderness and no frontal sinus tenderness. Left sinus exhibits no maxillary sinus tenderness and no frontal sinus tenderness.   Mouth/Throat: Uvula is midline, oropharynx is clear and moist and mucous membranes are normal. Mucous membranes are not pale and not dry. No dental caries. No oropharyngeal exudate or posterior oropharyngeal erythema.   Eyes: Conjunctivae, EOM and lids are normal. Pupils are equal, round, and reactive to light. Right eye exhibits no chemosis. Left eye exhibits no chemosis. Right conjunctiva is not injected. Left conjunctiva is not injected. No scleral icterus. Right eye exhibits normal extraocular motion and no nystagmus. Left eye exhibits normal extraocular motion and no nystagmus.   Neck: Trachea normal and phonation normal. No tracheal tenderness present. No tracheal deviation present. No thyroid mass and no thyromegaly present.   Cardiovascular: Intact distal pulses.    Pulmonary/Chest: Effort normal. No stridor. No respiratory distress.   Abdominal: She exhibits no distension.   Lymphadenopathy:        Head (right side): No submental, no submandibular, no preauricular, no posterior auricular and no occipital adenopathy present.        Head (left side): No submental, no submandibular, no preauricular, no posterior auricular and no occipital adenopathy present.     She has no cervical adenopathy.   Neurological: She is alert and oriented to person, place, and time. No cranial nerve deficit.   Skin: Skin is warm and dry. No rash noted. No erythema.   Psychiatric: She has a normal mood and affect. Her behavior is normal.       Assessment:       1. Failed hearing screening        Plan:     Failed hearing screening     I  would recommend a formal audiogram at her convenience.  Audiology will please forward me her result when complete to review.  Discussed that often times by nature of the screening test there are some a false-positive result, there certainly is a possibility that her audiogram will be normal. She will call to schedule when ready.

## 2022-04-11 NOTE — TELEPHONE ENCOUNTER
Called pt she said it is just water and slime that comes out. She wants to know is there anything she can take for the diahrrea.   She wants to see if dispatch health can come out she is afraid she may have an accident if she comes in please advise

## 2022-04-13 ENCOUNTER — OFFICE VISIT (OUTPATIENT)
Dept: FAMILY MEDICINE CLINIC | Age: 87
End: 2022-04-13
Payer: MEDICARE

## 2022-04-13 VITALS
HEART RATE: 61 BPM | RESPIRATION RATE: 18 BRPM | WEIGHT: 140 LBS | SYSTOLIC BLOOD PRESSURE: 120 MMHG | OXYGEN SATURATION: 96 % | BODY MASS INDEX: 23.32 KG/M2 | HEIGHT: 65 IN | DIASTOLIC BLOOD PRESSURE: 70 MMHG

## 2022-04-13 DIAGNOSIS — C50.411 MALIGNANT NEOPLASM OF UPPER-OUTER QUADRANT OF RIGHT BREAST IN FEMALE, ESTROGEN RECEPTOR POSITIVE (HCC): ICD-10-CM

## 2022-04-13 DIAGNOSIS — Z01.818 PRE-OP EXAM: Primary | ICD-10-CM

## 2022-04-13 DIAGNOSIS — Z17.0 MALIGNANT NEOPLASM OF UPPER-OUTER QUADRANT OF RIGHT BREAST IN FEMALE, ESTROGEN RECEPTOR POSITIVE (HCC): ICD-10-CM

## 2022-04-13 PROCEDURE — G8420 CALC BMI NORM PARAMETERS: HCPCS | Performed by: FAMILY MEDICINE

## 2022-04-13 PROCEDURE — G8427 DOCREV CUR MEDS BY ELIG CLIN: HCPCS | Performed by: FAMILY MEDICINE

## 2022-04-13 PROCEDURE — 1090F PRES/ABSN URINE INCON ASSESS: CPT | Performed by: FAMILY MEDICINE

## 2022-04-13 PROCEDURE — 93000 ELECTROCARDIOGRAM COMPLETE: CPT | Performed by: FAMILY MEDICINE

## 2022-04-13 PROCEDURE — 99213 OFFICE O/P EST LOW 20 MIN: CPT | Performed by: FAMILY MEDICINE

## 2022-04-13 RX ORDER — SUCRALFATE 1 G/1
TABLET ORAL
Qty: 180 TABLET | Refills: 1 | Status: SHIPPED | OUTPATIENT
Start: 2022-04-13

## 2022-04-13 ASSESSMENT — PATIENT HEALTH QUESTIONNAIRE - PHQ9
1. LITTLE INTEREST OR PLEASURE IN DOING THINGS: 0
2. FEELING DOWN, DEPRESSED OR HOPELESS: 0
SUM OF ALL RESPONSES TO PHQ QUESTIONS 1-9: 0
SUM OF ALL RESPONSES TO PHQ QUESTIONS 1-9: 0
SUM OF ALL RESPONSES TO PHQ9 QUESTIONS 1 & 2: 0
SUM OF ALL RESPONSES TO PHQ QUESTIONS 1-9: 0
SUM OF ALL RESPONSES TO PHQ QUESTIONS 1-9: 0

## 2022-04-13 NOTE — PROGRESS NOTES
sucSubjective:     Chief Complaint   Patient presents with    Pre-op Exam     Pt is having a lumpactomy on 04/21/2022 of the right breast. Dr. Arron Serrano is doing the surgery. Milan Shankar is a 81 y.o.  female who presents to the office today for a preoperative consultation at the request of surgeon Dr. Marky Kwan who plans on performing R breast lumpectomy on 4/13/22. The problem warranting surgery is biopsy-proven breast cancer, ER/SC positive. Risk factors include:    Diabetes: no  Coronary Artery Disease: none known  COPD: no  Tobacco use? no    Planned anesthesia is General.   History of anesthesia problems? No, has tolerated general anesthesia well in the past  No history of bleeding disorder.  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 Agustin Gregg GERD (gastroesophageal reflux disease)    Mixed hyperlipidemia    Osteopenia of multiple sites - DEXA 1/2019    History of basal cell carcinoma - back of neck excised 2019     Past Surgical History:   Procedure Laterality Date    CARPAL TUNNEL RELEASE Right     HYSTERECTOMY      partial    NECK SURGERY      discectomy    ROTATOR CUFF REPAIR Left     SHOULDER ARTHROPLASTY Left 07/29/2014    TOTAL KNEE ARTHROPLASTY Left 01/29/2014    left knee     TOTAL KNEE ARTHROPLASTY Right 11/16/2012    TUMOR REMOVAL      tumor on thyroid ovary and tube    US BREAST NEEDLE BIOPSY RIGHT Right 3/28/2022    US BREAST NEEDLE BIOPSY RIGHT 3/28/2022 [facility]    VARICOSE VEIN SURGERY Bilateral      Family History   Problem Relation Age of Onset    High Blood Pressure Mother     No Known Problems Father  Arthritis Other     High Blood Pressure Other     Stroke Other      Allergies   Allergen Reactions    Tramadol Other (See Comments)      GI Upset    Codeine Nausea And Vomiting    Morphine And Related Nausea And Vomiting     Prior to Visit Medications    Medication Sig Taking? Authorizing Provider   atorvastatin (LIPITOR) 10 MG tablet Take 1 tablet by mouth once daily Yes Gabriela Sam MD   vitamin C (ASCORBIC ACID) 500 MG tablet Take 500 mg by mouth daily Yes Historical Provider, MD   valsartan (DIOVAN) 160 MG tablet TAKE ONE TABLET BY MOUTH DAILY Yes Gabriela Sam MD   fluticasone (FLONASE) 50 MCG/ACT nasal spray 2 sprays by Nasal route daily  Patient taking differently: 2 sprays by Nasal route daily as needed  Yes Louise Scruggs APRN - CNP   mometasone (ELOCON) 0.1 % cream APPLY TOPICALLY DAILY TO THE AFFECTED AREAS AS NEEDED  Patient taking differently: daily as needed APPLY TOPICALLY DAILY TO THE AFFECTED AREAS AS NEEDED Yes Gabriela Sam MD   Multiple Vitamins-Minerals (MULTIVITAMIN ADULT) CHEW Take by mouth Yes Historical Provider, MD   Vitamin D (CHOLECALCIFEROL) 25 MCG (1000 UT) TABS tablet Take by mouth daily  Yes Historical Provider, MD   metoprolol tartrate (LOPRESSOR) 50 MG tablet TAKE ONE TABLET BY MOUTH TWICE A DAY Yes Gabriela Sam MD   dicyclomine (BENTYL) 10 MG capsule Take 1 capsule by mouth 4 times daily  Patient taking differently: Take 10 mg by mouth 4 times daily as needed  Yes Luann Parson MD   sucralfate (CARAFATE) 1 GM tablet Take 1 tablet by mouth 4 times daily BEFORE MEALS  Patient taking differently: Take 1 g by mouth 2 times daily (before meals)  Yes Gabriela Sam MD   lansoprazole (PREVACID) 30 MG capsule Take 30 mg by mouth daily. Yes Historical Provider, MD   aspirin 81 MG tablet Take 81 mg by mouth daily. Yes Historical Provider, MD   Misc Intestinal Radha Regulat (ALIGN) CAPS Take  by mouth daily.  Yes Historical Provider, MD   CALCIUM ACETATE Take 1 tablet by mouth 2 times daily tolerated general anesthesia well in the past  - No history of bleeding disorder. She has a history of postop-DVT and known Factor VIII. This is a low risk surgery without anticipated bedrest so no post-op anticoagulation is warranted     Pt to stop all rx the AM of surgery    Patient advised to hold blood thinning medication (including aspirin and NSAIDs) 7 days prior to procedure. Prophylaxis for cardiac events with perioperative beta-blockers: already on BB      Thank you for assisting me in the care of this patient.

## 2022-04-14 ENCOUNTER — TELEPHONE (OUTPATIENT)
Dept: SURGERY | Age: 87
End: 2022-04-14

## 2022-04-20 ENCOUNTER — ANESTHESIA EVENT (OUTPATIENT)
Dept: OPERATING ROOM | Age: 87
End: 2022-04-20
Payer: MEDICARE

## 2022-04-21 ENCOUNTER — HOSPITAL ENCOUNTER (OUTPATIENT)
Age: 87
Setting detail: OUTPATIENT SURGERY
Discharge: HOME | End: 2022-04-21
Attending: SURGERY | Admitting: SURGERY
Payer: MEDICARE

## 2022-04-21 ENCOUNTER — ANESTHESIA (OUTPATIENT)
Dept: OPERATING ROOM | Age: 87
End: 2022-04-21
Payer: MEDICARE

## 2022-04-21 VITALS — OXYGEN SATURATION: 100 % | DIASTOLIC BLOOD PRESSURE: 58 MMHG | SYSTOLIC BLOOD PRESSURE: 114 MMHG

## 2022-04-21 VITALS
SYSTOLIC BLOOD PRESSURE: 148 MMHG | RESPIRATION RATE: 18 BRPM | OXYGEN SATURATION: 95 % | TEMPERATURE: 97 F | WEIGHT: 141.98 LBS | HEART RATE: 57 BPM | BODY MASS INDEX: 23.65 KG/M2 | HEIGHT: 65 IN | DIASTOLIC BLOOD PRESSURE: 55 MMHG

## 2022-04-21 DIAGNOSIS — Z17.0 MALIGNANT NEOPLASM OF UPPER-OUTER QUADRANT OF RIGHT BREAST IN FEMALE, ESTROGEN RECEPTOR POSITIVE (HCC): ICD-10-CM

## 2022-04-21 DIAGNOSIS — C50.411 MALIGNANT NEOPLASM OF UPPER-OUTER QUADRANT OF RIGHT BREAST IN FEMALE, ESTROGEN RECEPTOR POSITIVE (HCC): ICD-10-CM

## 2022-04-21 PROCEDURE — 88331 PATH CONSLTJ SURG 1 BLK 1SPC: CPT

## 2022-04-21 PROCEDURE — 2500000003 HC RX 250 WO HCPCS

## 2022-04-21 PROCEDURE — 7100000001 HC PACU RECOVERY - ADDTL 15 MIN: Performed by: SURGERY

## 2022-04-21 PROCEDURE — 2580000003 HC RX 258: Performed by: SURGERY

## 2022-04-21 PROCEDURE — 7100000010 HC PHASE II RECOVERY - FIRST 15 MIN: Performed by: SURGERY

## 2022-04-21 PROCEDURE — 14001 TIS TRNFR TRUNK 10.1-30SQCM: CPT | Performed by: SURGERY

## 2022-04-21 PROCEDURE — 3600000012 HC SURGERY LEVEL 2 ADDTL 15MIN: Performed by: SURGERY

## 2022-04-21 PROCEDURE — 6370000000 HC RX 637 (ALT 250 FOR IP): Performed by: ANESTHESIOLOGY

## 2022-04-21 PROCEDURE — 6360000002 HC RX W HCPCS: Performed by: ANESTHESIOLOGY

## 2022-04-21 PROCEDURE — 88342 IMHCHEM/IMCYTCHM 1ST ANTB: CPT

## 2022-04-21 PROCEDURE — 2580000003 HC RX 258: Performed by: ANESTHESIOLOGY

## 2022-04-21 PROCEDURE — 3600000002 HC SURGERY LEVEL 2 BASE: Performed by: SURGERY

## 2022-04-21 PROCEDURE — 88341 IMHCHEM/IMCYTCHM EA ADD ANTB: CPT

## 2022-04-21 PROCEDURE — 88307 TISSUE EXAM BY PATHOLOGIST: CPT

## 2022-04-21 PROCEDURE — 7100000000 HC PACU RECOVERY - FIRST 15 MIN: Performed by: SURGERY

## 2022-04-21 PROCEDURE — 7100000011 HC PHASE II RECOVERY - ADDTL 15 MIN: Performed by: SURGERY

## 2022-04-21 PROCEDURE — 2500000003 HC RX 250 WO HCPCS: Performed by: SURGERY

## 2022-04-21 PROCEDURE — 19301 PARTIAL MASTECTOMY: CPT | Performed by: SURGERY

## 2022-04-21 PROCEDURE — 3700000001 HC ADD 15 MINUTES (ANESTHESIA): Performed by: SURGERY

## 2022-04-21 PROCEDURE — A4217 STERILE WATER/SALINE, 500 ML: HCPCS | Performed by: SURGERY

## 2022-04-21 PROCEDURE — 6360000002 HC RX W HCPCS

## 2022-04-21 PROCEDURE — 6370000000 HC RX 637 (ALT 250 FOR IP): Performed by: SURGERY

## 2022-04-21 PROCEDURE — 6360000002 HC RX W HCPCS: Performed by: SURGERY

## 2022-04-21 PROCEDURE — 2709999900 HC NON-CHARGEABLE SUPPLY: Performed by: SURGERY

## 2022-04-21 PROCEDURE — 88305 TISSUE EXAM BY PATHOLOGIST: CPT

## 2022-04-21 PROCEDURE — 3700000000 HC ANESTHESIA ATTENDED CARE: Performed by: SURGERY

## 2022-04-21 RX ORDER — SODIUM CHLORIDE 9 MG/ML
INJECTION, SOLUTION INTRAVENOUS PRN
Status: DISCONTINUED | OUTPATIENT
Start: 2022-04-21 | End: 2022-04-21 | Stop reason: HOSPADM

## 2022-04-21 RX ORDER — FENTANYL CITRATE 50 UG/ML
INJECTION, SOLUTION INTRAMUSCULAR; INTRAVENOUS PRN
Status: DISCONTINUED | OUTPATIENT
Start: 2022-04-21 | End: 2022-04-21 | Stop reason: SDUPTHER

## 2022-04-21 RX ORDER — OXYCODONE HYDROCHLORIDE 5 MG/1
5 TABLET ORAL
Status: COMPLETED | OUTPATIENT
Start: 2022-04-21 | End: 2022-04-21

## 2022-04-21 RX ORDER — SODIUM CHLORIDE 0.9 % (FLUSH) 0.9 %
5-40 SYRINGE (ML) INJECTION PRN
Status: DISCONTINUED | OUTPATIENT
Start: 2022-04-21 | End: 2022-04-21 | Stop reason: HOSPADM

## 2022-04-21 RX ORDER — PROPOFOL 10 MG/ML
INJECTION, EMULSION INTRAVENOUS CONTINUOUS PRN
Status: DISCONTINUED | OUTPATIENT
Start: 2022-04-21 | End: 2022-04-21 | Stop reason: SDUPTHER

## 2022-04-21 RX ORDER — ONDANSETRON 2 MG/ML
4 INJECTION INTRAMUSCULAR; INTRAVENOUS
Status: DISCONTINUED | OUTPATIENT
Start: 2022-04-21 | End: 2022-04-21 | Stop reason: HOSPADM

## 2022-04-21 RX ORDER — SODIUM CHLORIDE 0.9 % (FLUSH) 0.9 %
5-40 SYRINGE (ML) INJECTION EVERY 12 HOURS SCHEDULED
Status: DISCONTINUED | OUTPATIENT
Start: 2022-04-21 | End: 2022-04-21 | Stop reason: HOSPADM

## 2022-04-21 RX ORDER — FENTANYL CITRATE 50 UG/ML
25 INJECTION, SOLUTION INTRAMUSCULAR; INTRAVENOUS EVERY 5 MIN PRN
Status: DISCONTINUED | OUTPATIENT
Start: 2022-04-21 | End: 2022-04-21 | Stop reason: HOSPADM

## 2022-04-21 RX ORDER — SODIUM CHLORIDE 9 MG/ML
INJECTION, SOLUTION INTRAVENOUS CONTINUOUS
Status: DISCONTINUED | OUTPATIENT
Start: 2022-04-21 | End: 2022-04-21 | Stop reason: HOSPADM

## 2022-04-21 RX ORDER — MAGNESIUM HYDROXIDE 1200 MG/15ML
LIQUID ORAL CONTINUOUS PRN
Status: COMPLETED | OUTPATIENT
Start: 2022-04-21 | End: 2022-04-21

## 2022-04-21 RX ORDER — LIDOCAINE HYDROCHLORIDE 20 MG/ML
INJECTION, SOLUTION EPIDURAL; INFILTRATION; INTRACAUDAL; PERINEURAL PRN
Status: DISCONTINUED | OUTPATIENT
Start: 2022-04-21 | End: 2022-04-21 | Stop reason: SDUPTHER

## 2022-04-21 RX ORDER — ACETAMINOPHEN 325 MG/1
650 TABLET ORAL
Status: COMPLETED | OUTPATIENT
Start: 2022-04-21 | End: 2022-04-21

## 2022-04-21 RX ORDER — APREPITANT 40 MG/1
40 CAPSULE ORAL ONCE
Status: COMPLETED | OUTPATIENT
Start: 2022-04-21 | End: 2022-04-21

## 2022-04-21 RX ORDER — BUPIVACAINE HYDROCHLORIDE 5 MG/ML
INJECTION, SOLUTION EPIDURAL; INTRACAUDAL
Status: COMPLETED | OUTPATIENT
Start: 2022-04-21 | End: 2022-04-21

## 2022-04-21 RX ORDER — ONDANSETRON 2 MG/ML
INJECTION INTRAMUSCULAR; INTRAVENOUS PRN
Status: DISCONTINUED | OUTPATIENT
Start: 2022-04-21 | End: 2022-04-21 | Stop reason: SDUPTHER

## 2022-04-21 RX ORDER — HYDROCODONE BITARTRATE AND ACETAMINOPHEN 5; 325 MG/1; MG/1
1 TABLET ORAL EVERY 6 HOURS PRN
Qty: 5 TABLET | Refills: 0 | Status: SHIPPED | OUTPATIENT
Start: 2022-04-21 | End: 2022-04-24

## 2022-04-21 RX ADMIN — ONDANSETRON 4 MG: 2 INJECTION INTRAMUSCULAR; INTRAVENOUS at 07:56

## 2022-04-21 RX ADMIN — ACETAMINOPHEN 650 MG: 325 TABLET ORAL at 06:40

## 2022-04-21 RX ADMIN — OXYCODONE 5 MG: 5 TABLET ORAL at 11:12

## 2022-04-21 RX ADMIN — CEFAZOLIN SODIUM 2000 MG: 10 INJECTION, POWDER, FOR SOLUTION INTRAVENOUS at 07:51

## 2022-04-21 RX ADMIN — LIDOCAINE HYDROCHLORIDE 60 MG: 20 INJECTION, SOLUTION EPIDURAL; INFILTRATION; INTRACAUDAL; PERINEURAL at 07:41

## 2022-04-21 RX ADMIN — APREPITANT 40 MG: 40 CAPSULE ORAL at 07:08

## 2022-04-21 RX ADMIN — FENTANYL CITRATE 25 MCG: 50 INJECTION, SOLUTION INTRAMUSCULAR; INTRAVENOUS at 09:14

## 2022-04-21 RX ADMIN — PROPOFOL 300 MCG/KG/MIN: 10 INJECTION, EMULSION INTRAVENOUS at 07:41

## 2022-04-21 RX ADMIN — FENTANYL CITRATE 50 MCG: 50 INJECTION INTRAMUSCULAR; INTRAVENOUS at 07:38

## 2022-04-21 RX ADMIN — SODIUM CHLORIDE: 9 INJECTION, SOLUTION INTRAVENOUS at 06:38

## 2022-04-21 ASSESSMENT — PULMONARY FUNCTION TESTS
PIF_VALUE: 0
PIF_VALUE: 0
PIF_VALUE: 1
PIF_VALUE: 0
PIF_VALUE: 1
PIF_VALUE: 0
PIF_VALUE: 1
PIF_VALUE: 1
PIF_VALUE: 0
PIF_VALUE: 1
PIF_VALUE: 0
PIF_VALUE: 1
PIF_VALUE: 0
PIF_VALUE: 1
PIF_VALUE: 0
PIF_VALUE: 1
PIF_VALUE: 0
PIF_VALUE: 1
PIF_VALUE: 0
PIF_VALUE: 0
PIF_VALUE: 1
PIF_VALUE: 0
PIF_VALUE: 1
PIF_VALUE: 0
PIF_VALUE: 1
PIF_VALUE: 1
PIF_VALUE: 0

## 2022-04-21 ASSESSMENT — PAIN DESCRIPTION - PAIN TYPE
TYPE: SURGICAL PAIN

## 2022-04-21 ASSESSMENT — PAIN DESCRIPTION - LOCATION
LOCATION: BREAST

## 2022-04-21 ASSESSMENT — PAIN - FUNCTIONAL ASSESSMENT
PAIN_FUNCTIONAL_ASSESSMENT: ACTIVITIES ARE NOT PREVENTED
PAIN_FUNCTIONAL_ASSESSMENT: NONE - DENIES PAIN
PAIN_FUNCTIONAL_ASSESSMENT: ACTIVITIES ARE NOT PREVENTED

## 2022-04-21 ASSESSMENT — PAIN DESCRIPTION - ONSET
ONSET: ON-GOING
ONSET: ON-GOING
ONSET: GRADUAL
ONSET: ON-GOING
ONSET: ON-GOING

## 2022-04-21 ASSESSMENT — PAIN SCALES - GENERAL
PAINLEVEL_OUTOF10: 4
PAINLEVEL_OUTOF10: 5
PAINLEVEL_OUTOF10: 3
PAINLEVEL_OUTOF10: 4

## 2022-04-21 ASSESSMENT — PAIN DESCRIPTION - FREQUENCY
FREQUENCY: CONTINUOUS

## 2022-04-21 ASSESSMENT — PAIN DESCRIPTION - ORIENTATION
ORIENTATION: RIGHT

## 2022-04-21 ASSESSMENT — PAIN DESCRIPTION - DESCRIPTORS
DESCRIPTORS: BURNING

## 2022-04-21 NOTE — H&P
Update History & Physical    The patient's History and Physical of April 13, 2022 was reviewed with the patient and I examined the patient. There was no change. The surgical site was confirmed by the patient and me. Plan: The risks, benefits, expected outcome, and alternative to the recommended procedure have been discussed with the patient. Patient understands and wants to proceed with the procedure.      Electronically signed by Trav Bonilla MD on 4/21/2022 at 7:34 AM

## 2022-04-21 NOTE — ANESTHESIA PRE PROCEDURE
[facility] Department of Anesthesiology  Pre-Anesthesia Evaluation/Consultation       Name:  Jorge A Cabrera  : 1935  Age:  81 y.o. 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 Minal Hernandez GERD (gastroesophageal reflux disease)    Mixed hyperlipidemia    Osteopenia of multiple sites - DEXA 2019    History of basal cell carcinoma - back of neck excised      Past Medical History:   Diagnosis Date    Arthritis     GERD (gastroesophageal reflux disease)     High blood pressure     History of factor VIII deficiency     History of IBS     Hx of blood clots     PE    Hyperlipidemia     PONV (postoperative nausea and vomiting)     severe    UTI (urinary tract infection) 14    Wears glasses     reading     Past Surgical History:   Procedure Laterality Date    CARPAL TUNNEL RELEASE Right     HYSTERECTOMY      partial    NECK SURGERY      discectomy    ROTATOR CUFF REPAIR Left     SHOULDER ARTHROPLASTY Left 2014    TOTAL KNEE ARTHROPLASTY Left 2014    left knee     TOTAL KNEE ARTHROPLASTY Right 2012    TUMOR REMOVAL      tumor on thyroid ovary and tube    US BREAST NEEDLE BIOPSY RIGHT Right 3/28/2022    US BREAST NEEDLE BIOPSY RIGHT 3/28/2022 [facility]    VARICOSE VEIN SURGERY Bilateral      Social History     Tobacco Use    Smoking status: Former Smoker     Packs/day: 1.00     Types: Cigarettes    Smokeless tobacco: Never Used    Tobacco comment: quit over 50 years ago   Vaping Use    Vaping Use: Never used   Substance Use Topics    Alcohol use: Yes     Comment: social    Drug use: Never     Medications  No current facility-administered medications on file prior to encounter. Current Outpatient Medications on File Prior to Encounter   Medication Sig Dispense Refill    vitamin C (ASCORBIC ACID) 500 MG tablet Take 500 mg by mouth daily      valsartan (DIOVAN) 160 MG tablet TAKE ONE TABLET BY MOUTH DAILY 90 tablet 1    fluticasone (FLONASE) 50 MCG/ACT nasal spray 2 sprays by Nasal route daily (Patient taking differently: 2 sprays by Nasal route daily as needed ) 16 g 2    mometasone (ELOCON) 0.1 % cream APPLY TOPICALLY DAILY TO THE AFFECTED AREAS AS NEEDED (Patient taking differently: daily as needed APPLY TOPICALLY DAILY TO THE AFFECTED AREAS AS NEEDED) 1 each 5    Multiple Vitamins-Minerals (MULTIVITAMIN ADULT) CHEW Take by mouth      Vitamin D (CHOLECALCIFEROL) 25 MCG (1000 UT) TABS tablet Take by mouth daily       metoprolol tartrate (LOPRESSOR) 50 MG tablet TAKE ONE TABLET BY MOUTH TWICE A  tablet 1    dicyclomine (BENTYL) 10 MG capsule Take 1 capsule by mouth 4 times daily (Patient taking differently: Take 10 mg by mouth 4 times daily as needed ) 360 capsule 1    lansoprazole (PREVACID) 30 MG capsule Take 30 mg by mouth daily.  aspirin 81 MG tablet Take 81 mg by mouth daily.  Misc Intestinal Radha Regulat (ALIGN) CAPS Take  by mouth daily.  CALCIUM ACETATE Take 1 tablet by mouth 2 times daily       GLUCOSAMINE CHONDROITIN COMPLX PO Take  by mouth 2 times daily.        Current Facility-Administered Medications   Medication Dose Route Frequency Provider Last Rate Last Admin    0.9 % sodium chloride infusion   IntraVENous Continuous Magdalena Chavez  mL/hr at 04/21/22 3546 New Bag at 04/21/22 6559    sodium chloride flush 0.9 % injection 5-40 mL  5-40 mL IntraVENous 2 times per day Guero Quesada MD        sodium chloride flush 0.9 % injection 5-40 mL  5-40 mL IntraVENous PRN Guero Quesada MD        0.9 % sodium chloride infusion   IntraVENous PRN Guero Quesada MD        ceFAZolin (ANCEF) 2000 mg in dextrose 5 % 100 mL IVPB  2,000 mg IntraVENous Once Cowdreyjulius Michael MD         Vital Signs (Current)   Vitals:    22   BP: (!) 154/55   Pulse: 62   Resp: 16   Temp: 97.2 °F (36.2 °C)   SpO2: 96%     Vital Signs Statistics (for past 48 hrs)     Temp  Av.2 °F (36.2 °C)  Min: 97.2 °F (36.2 °C)   Min taken time: 22  Max: 97.2 °F (36.2 °C)   Max taken time: 22  Pulse  Av  Min: 58   Min taken time: 22  Max: 58   Max taken time: 22  Resp  Av  Min: 12   Min taken time: 22  Max: 12   Max taken time: 22  BP  Min: 154/55   Min taken time: 22  Max: 154/55   Max taken time: 22  SpO2  Av %  Min: 96 %   Min taken time: 22  Max: 96 %   Max taken time: 22    BP Readings from Last 3 Encounters:   22 (!) 154/55   22 120/70   22 (!) 126/59     BMI  Body mass index is 23.63 kg/m². Estimated body mass index is 23.63 kg/m² as calculated from the following:    Height as of this encounter: 5' 5\" (1.651 m). Weight as of this encounter: 141 lb 15.6 oz (64.4 kg).     CBC   Lab Results   Component Value Date    WBC 4.9 2021    RBC 4.14 2021    HGB 13.1 2021    HCT 39.8 2021    MCV 96.0 2021    RDW 14.2 2021     2021     CMP    Lab Results   Component Value Date     2021    K 4.6 2021     2021    CO2 27 2021    BUN 25 2021    CREATININE 0.8 2021    GFRAA >60 2021    GFRAA >60 2012    AGRATIO 1.4 2021    LABGLOM >60 2021    GLUCOSE 105 2021    PROT 7.2 09/13/2021    CALCIUM 9.4 09/13/2021    BILITOT 0.4 09/13/2021    ALKPHOS 77 09/13/2021    AST 23 09/13/2021    ALT 14 09/13/2021     BMP    Lab Results   Component Value Date     09/13/2021    K 4.6 09/13/2021     09/13/2021    CO2 27 09/13/2021    BUN 25 09/13/2021    CREATININE 0.8 09/13/2021    CALCIUM 9.4 09/13/2021    GFRAA >60 09/13/2021    GFRAA >60 11/13/2012    LABGLOM >60 09/13/2021    GLUCOSE 105 09/13/2021     POCGlucose  No results for input(s): GLUCOSE in the last 72 hours. Coags    Lab Results   Component Value Date    PROTIME 10.0 07/22/2014    PROTIME 10.0 01/19/2010    INR 0.89 07/22/2014    APTT 30.8 04/10/6536     HCG (If Applicable) No results found for: PREGTESTUR, PREGSERUM, HCG, HCGQUANT   ABGs No results found for: PHART, PO2ART, GRM2OFW, KND0ZWD, BEART, Q9VSQPQI   Type & Screen (If Applicable)  Lab Results   Component Value Date    LABABO O 11/13/2012    [facility]                            BMI: Wt Readings from Last 3 Encounters:       NPO Status:   Date of last liquid consumption: 04/20/22   Time of last liquid consumption: 2000   Date of last solid food consumption: 04/20/22      Time of last solid consumption: 2000       Anesthesia Evaluation  Patient summary reviewed   history of anesthetic complications: PONV. Airway: Mallampati: III  TM distance: >3 FB   Neck ROM: full   Dental:    (+) upper dentures      Pulmonary:Negative Pulmonary ROS and normal exam                               Cardiovascular:  Exercise tolerance: good (>4 METS),   (+) hypertension:,       ECG reviewed  Rhythm: regular  Rate: normal           Beta Blocker:  Dose within 24 Hrs         Neuro/Psych:   Negative Neuro/Psych ROS              GI/Hepatic/Renal:   (+) GERD: well controlled,          ROS comment: IBS. Endo/Other: Negative Endo/Other ROS                    Abdominal:             Vascular:   + PVD, aortic or cerebral (GINO), DVT (Hx), .        Other Findings: Anesthesia Plan      MAC     ASA 3       Induction: intravenous. MIPS: Postoperative opioids intended and Prophylactic antiemetics administered. Anesthetic plan and risks discussed with patient and child/children. Plan discussed with CRNA. This pre-anesthesia assessment may be used as a history and physical.    DOS STAFF ADDENDUM:    Pt seen and examined, chart reviewed (including anesthesia, drug and allergy history). No interval changes to history and physical examination. Anesthetic plan, risks, benefits, alternatives, and personnel involved discussed with patient. Questions and concerns addressed. Patient(family) verbalized an understanding and agrees to proceed.       Teto Miller MD  April 21, 2022  6:53 AM

## 2022-04-21 NOTE — OP NOTE
Operative Note      Patient: Beverly Zelaya  YOB: 1935  MRN: [id number]    Date of Procedure: 4/21/2022    Pre-Op Diagnosis: Malignant neoplasm of upper-outer quadrant of right breast in female, estrogen receptor positive    Post-Op Diagnosis: Same       Procedure(s):  RIGHT BREAST LUMPECTOMY    Surgeon(s): Victor Hugo Sofia MD    Assistant:   Surgical Assistant: Edgar Jacob    Anesthesia: Monitor Anesthesia Care    Estimated Blood Loss (mL): Minimal    Complications: None    Specimens:   ID Type Source Tests Collected by Time Destination   A : A. RIGHT BREAST CANCER SHORT SUPERIOR LONG LATERAL Tissue Breast SURGICAL PATHOLOGY Victor Hugo Sofia MD 4/21/2022 3522    B : B. SUPERIOR MARGIN INK ON NEW TRUE MARGIN RIGHT BREAST CANCER Tissue Breast SURGICAL PATHOLOGY Victor Hugo Sofia MD 4/21/2022 4158    C : C. MEDIAL MARGIN INK ON NEW MARGIN RIGHT BREAST CANCER Tissue Breast SURGICAL PATHOLOGY Victor Hugo Sofia MD 4/21/2022 1267    D : D. ANTERIOR INFERIOR INK ON NEW MARGIN RIGHT BREAST CANCER Tissue Breast SURGICAL PATHOLOGY Victor Hugo Sofia MD 4/21/2022 0825        Implants:  * No implants in log *      Drains: * No LDAs found *    Findings: see op note    Detailed Description of Procedure:   Operative Report      Name:  Beverly Zelaya   MRN:  [id number]  Date:  4/21/2022        PREOPERATIVE DIAGNOSIS: right breast cancer    POSTOPERATIVE DIAGNOSIS: same    PROCEDURE:right breast lumpectomy    SURGEON: Kentrell    ESTIMATED BLOOD LOSS:  Less than 25 mL    ASSISTANT: ANGELICA Polk    ANESTHESIA: MAC.    INDICATIONS FOR PROCEDURE: The patient is a 81 y.o. female who had  presented with a right breast mass, and needle biopsy showed ductal carcinoma. She is here now for right breast lumpectomy for clinical T1N0 ductal carcinoma. The risks, benefits and alternatives were discussed with the  patient. Questions were answered and she is agreeable to proceed. Ms. Juanita Lopez was met by me in the preoperative area. The surgical sites were identified. Consent was obtained. The appropriate breast imaging was reviewed. DESCRIPTION OF OPERATION: The patient was brought to the operating room and  placed on the OR table in the supine position. She was given IV sedation and the right breast and axillary region were prepped and draped in the usual sterile fashion. The area was injected with local anesthetic. An incision was made in the outer portion on the right breast and carried down through the skin and subcutaneous tissues. The mass was identified, and this was dissected free from the surrounding tissue using cautery and marked with sutures and sent to pathology for frozen section. I excised additional margins from all 6 sides, and these were marked with ink on the new true margins and also sent to pathology for permanent section. I placed clips in to the cavity where the tumor had been located. There was a fairly large surgical defect caused by  the removal of the breast tissue which was measured to be 4 cm x 3 cm in size. This made a defect measuring 12 cm2. It was elected to repair the surgical  defect using an oncoplastic tissue advancement procedure to achieve a breast  reconstruction with local tissue flaps. Tissue mobilization was done by  creating a pedicled superior and inferior tissue flap using cautery, and the flaps were  then mobilized and transferred to the central aspect of the surgical cavity. Care was taken to preserve the vasculature of the flaps. The deep tissue was then reapproximated using interrupted 3-0 vicryl suture. Once this was completed, the deformity in the breast was no longer present. The wound was irrigated and injected with local anesthetic, and closed with a deep layer of vicryl and skin was re-approximated with 4-0 Monocryl and covered with surgical glue. Sponge, needle and instrument counts were correct per nursing. The patient tolerated the procedure well.  She was taken to the  recovery room in stable condition.       Electronically signed by Rosario Jones MD on 4/21/2022 at 8:27 AM

## 2022-04-21 NOTE — PROGRESS NOTES
Assisted up to BR to void and then to chair. States she is ready to go home. Daughter assisting with dressing. Tolerating oral intake.

## 2022-04-21 NOTE — PROGRESS NOTES
Discharge instructions given to patient and daughter. Verbalize understanding. Denies need. Resting quietly.

## 2022-04-21 NOTE — PROGRESS NOTES
Assisted up to chair. Daughter at bedside. No complaints. Patient Education        Corneal Scratches: Care Instructions  Your Care Instructions     The cornea is the clear surface that covers the front of the eye. When a speck of dirt, a wood chip, an insect, or another object flies into your eye, it can cause a painful scratch on the cornea. Wearing contact lenses too long or rubbing your eyes can also scratch the cornea. Small scratches usually heal in a day or two. Deeper scratches may take longer. If you have had a foreign object removed from your eye or you have a corneal scratch, you will need to watch for infection and vision problems while your eye heals. Follow-up care is a key part of your treatment and safety. Be sure to make and go to all appointments, and call your doctor if you are having problems. It's also a good idea to know your test results and keep a list of the medicines you take. How can you care for yourself at home? · The doctor probably used a medicine during your exam to numb your eye. When it wears off in 30 to 60 minutes, your eye pain may come back. Take pain medicines exactly as directed. ? If the doctor gave you a prescription medicine for pain, take it as prescribed. ? If you are not taking a prescription pain medicine, ask your doctor if you can take an over-the-counter medicine. ? Do not take two or more pain medicines at the same time unless the doctor told you to. Many pain medicines have acetaminophen, which is Tylenol. Too much acetaminophen (Tylenol) can be harmful. · Do not rub your injured eye. Rubbing can make it worse. · Use the prescribed eyedrops or ointment as directed. Be sure the dropper or bottle tip is clean. To put in eyedrops or ointment:  ? Tilt your head back, and pull your lower eyelid down with one finger. ? Drop or squirt the medicine inside the lower lid. ? Close your eye for 30 to 60 seconds to let the drops or ointment move around.   ? Do not touch the ointment or dropper tip to your eyelashes or any other surface. · Do not use your contact lens in your hurt eye until your doctor says you can. Also, do not wear eye makeup until your eye has healed. · Do not drive if you have blurred vision. · Bright light may hurt. Sunglasses can help. · To prevent eye injuries in the future, wear safety glasses or goggles when you work with machines or tools, mow the lawn, or ride a bike or motorcycle. When should you call for help? Call your doctor now or seek immediate medical care if:  · You have signs of an eye infection, such as:  ? Pus or thick discharge coming from the eye.  ? Redness or swelling around the eye.  ? A fever. · You have new or worse eye pain. · You have vision changes. · It feels like there is something in your eye. · Light hurts your eye. Watch closely for changes in your health, and be sure to contact your doctor if:  · You do not get better as expected. Where can you learn more? Go to http://brenton-laureano.info/  Enter G403 in the search box to learn more about \"Corneal Scratches: Care Instructions. \"  Current as of: December 18, 2019               Content Version: 12.5  © 3118-3067 [employer]. Care instructions adapted under license by [employer] (which disclaims liability or warranty for this information). If you have questions about a medical condition or this instruction, always ask your healthcare professional. [place] [address] any warranty or liability for your use of this information.

## 2022-04-26 ENCOUNTER — TELEPHONE (OUTPATIENT)
Dept: BREAST CENTER | Age: 87
End: 2022-04-26

## 2022-04-26 NOTE — FLOWSHEET NOTE
Preoperative Screening for Elective Surgery/Invasive Procedures While COVID-19 present in the community     1. Have you tested positive or have been told to self-isolate for COVID-19 like symptoms within the past 28 days? 2. Do you currently have any of the following symptoms? ? Fever >100.0 F or 99.9 F in immunocompromised patients? ? New onset cough, shortness of breath or difficulty breathing? ? New onset sore throat, myalgia (muscle aches and pains), headache, loss of taste/smell or diarrhea? 3. Have you had a potential exposure to COVID-19 within the past 14 days by:  ? Close contact with a confirmed case? ? Close contact with a healthcare worker,  or essential infrastructure worker (grocery store, [employer], gas station, public utilities or transportation)? ? Do you reside in a congregate setting such as; skilled nursing facility, adult home, correctional facility, homeless shelter or other institutional setting? ? Have you had recent travel to a known COVID-19 hotspot? * Admitted with diarrhea? [] YES    [x]  NO     *Prior history of C-Diff. In last 3 months? [] YES    [x]  NO     *Antibiotic use in the past 6-8 weeks? [x]  NO    []  YES      If yes, which: REASON_________________     *Prior hospitalization or nursing home in the last month? []  YES    [x]  NO     SAFETY FIRST. .call before you fall    [facility] time__5/5/22 1040__________        Surgery time___1200_________    Do not eat or drink anything after 12:00 midnight prior to your surgery. This includes water chewing gum, mints and ice chips- the Day of Surgery. You may brush your teeth and gargle the morning of your surgery, but do not swallow the water     Please see your family doctor/pediatrician for a history and physical and/or questions concerning medications. Bring any test results/reports from your physicians office.    If you are under the care of a heart doctor or specialist doctor, please be aware that you may be asked to them for clearance    You may be asked to stop blood thinners such as Coumadin, Plavix, Fragmin, Lovenox, etc., or any anti-inflammatories such as:  Aspirin, Ibuprofen, Advil, Naproxen prior to your surgery. We also ask that you stop any OTC medications such as fish oil, vitamin E, glucosamine, garlic, Multivitamins, COQ 10, etc.    We ask that you do not smoke 24 hours prior to surgery  We ask that you do not  drink any alcoholic beverages 24 hours prior to surgery     You must make arrangements for a responsible adult to take you home after your surgery. For your safety you will not be allowed to leave alone or drive yourself home. Your surgery will be cancelled if you do not have a ride home. Also for your safety, it is strongly suggested that someone stay with you the first 24 hours after your surgery. A parent or legal guardian must accompany a child scheduled for surgery and plan to stay at the hospital until the child is discharged. Please do not bring other children with you. For your comfort, please wear simple loose fitting clothing to the hospital.  Please do not bring valuables. Do not wear any make-up or nail polish on your fingers or toes. For your safety, please do not wear any jewelry or body piercing's on the day of surgery. All jewelry must be removed. If you have dentures, they will be removed before going to operating room. For your convenience, we will provide you with a container. If you wear contact lenses or glasses, they will be removed, please bring a case for them. If you have a living will and a durable power of  for healthcare, please bring in a copy.      As part of our patient safety program to minimize surgical site infections, we ask you to do the following:    · Please notify your surgeon if you develop any illness between         now and the day of your surgery. · This includes a cough, cold, fever, sore throat, nausea,         or vomiting, and diarrhea, etc.  ·  Please notify your surgeon if you experience dizziness, shortness         of breath or blurred vision between now and the time of your surgery. Do not shave your operative site 96 hours prior to surgery. For face and neck surgery, men may use an electric razor 48 hours   prior to surgery. You may shower the night before surgery or the morning of   your surgery with an antibacterial soap. You will need to bring a photo ID and insurance card     If you use a C-pap or Bi-pap machine, please bring your machine with you to the hospital     Our goal is to provide you with excellent care, therefore, visitors will be limited to so that we may focus on providing this care for you. Please contact your surgeon office, if you have any further questions. [facility] phone number:  [phone] PAT fax number:  [phone]    Please note these are generalized instructions for all surgical cases, you may be provided with more specific instructions according to your surgery.

## 2022-04-26 NOTE — TELEPHONE ENCOUNTER
Spoke with patient regarding scheduled surgery on 05/05/2022 with  [facility]. Patient is asked to arrive by 10:40 am @ [address] after midnight. May take any Heart or Blood Pressure medication with a small sip of water to wash them down. You will need someone to drive you home following this procedure. Please bring a Photo ID & Insurance card with you, and check-in at the Surgery Desk down the right-hand hallway on the first floor. Patient has seen PCP for Pre-op H & P on 04/13/2022. Surgery is scheduled to start at approx. 12:10 pm and should take approx. 60 minutes. If the hospital needs any further information, someone will give you a call. Patient expressed a verbal understanding of these instructions and had no further questions at this time. Call ended.

## 2022-05-04 ENCOUNTER — ANESTHESIA EVENT (OUTPATIENT)
Dept: OPERATING ROOM | Age: 87
End: 2022-05-04
Payer: MEDICARE

## 2022-05-05 ENCOUNTER — ANESTHESIA (OUTPATIENT)
Dept: OPERATING ROOM | Age: 87
End: 2022-05-05
Payer: MEDICARE

## 2022-05-05 ENCOUNTER — HOSPITAL ENCOUNTER (OUTPATIENT)
Age: 87
Setting detail: OUTPATIENT SURGERY
Discharge: HOME | End: 2022-05-05
Attending: SURGERY | Admitting: SURGERY
Payer: MEDICARE

## 2022-05-05 VITALS
OXYGEN SATURATION: 99 % | RESPIRATION RATE: 1 BRPM | SYSTOLIC BLOOD PRESSURE: 111 MMHG | DIASTOLIC BLOOD PRESSURE: 73 MMHG

## 2022-05-05 VITALS
SYSTOLIC BLOOD PRESSURE: 152 MMHG | OXYGEN SATURATION: 96 % | WEIGHT: 138.89 LBS | BODY MASS INDEX: 23.14 KG/M2 | HEIGHT: 65 IN | DIASTOLIC BLOOD PRESSURE: 68 MMHG | TEMPERATURE: 97.9 F | RESPIRATION RATE: 18 BRPM | HEART RATE: 68 BPM

## 2022-05-05 DIAGNOSIS — C50.411 MALIGNANT NEOPLASM OF UPPER-OUTER QUADRANT OF RIGHT BREAST IN FEMALE, ESTROGEN RECEPTOR POSITIVE (HCC): ICD-10-CM

## 2022-05-05 DIAGNOSIS — Z17.0 MALIGNANT NEOPLASM OF UPPER-OUTER QUADRANT OF RIGHT BREAST IN FEMALE, ESTROGEN RECEPTOR POSITIVE (HCC): ICD-10-CM

## 2022-05-05 PROCEDURE — 7100000001 HC PACU RECOVERY - ADDTL 15 MIN: Performed by: SURGERY

## 2022-05-05 PROCEDURE — 2500000003 HC RX 250 WO HCPCS: Performed by: NURSE ANESTHETIST, CERTIFIED REGISTERED

## 2022-05-05 PROCEDURE — 3600000012 HC SURGERY LEVEL 2 ADDTL 15MIN: Performed by: SURGERY

## 2022-05-05 PROCEDURE — 7100000000 HC PACU RECOVERY - FIRST 15 MIN: Performed by: SURGERY

## 2022-05-05 PROCEDURE — 2500000003 HC RX 250 WO HCPCS: Performed by: SURGERY

## 2022-05-05 PROCEDURE — 19301 PARTIAL MASTECTOMY: CPT | Performed by: SURGERY

## 2022-05-05 PROCEDURE — 3700000000 HC ANESTHESIA ATTENDED CARE: Performed by: SURGERY

## 2022-05-05 PROCEDURE — 2709999900 HC NON-CHARGEABLE SUPPLY: Performed by: SURGERY

## 2022-05-05 PROCEDURE — 2580000003 HC RX 258: Performed by: SURGERY

## 2022-05-05 PROCEDURE — 7100000011 HC PHASE II RECOVERY - ADDTL 15 MIN: Performed by: SURGERY

## 2022-05-05 PROCEDURE — 88305 TISSUE EXAM BY PATHOLOGIST: CPT

## 2022-05-05 PROCEDURE — 6360000002 HC RX W HCPCS: Performed by: SURGERY

## 2022-05-05 PROCEDURE — 6360000002 HC RX W HCPCS: Performed by: ANESTHESIOLOGY

## 2022-05-05 PROCEDURE — 3600000002 HC SURGERY LEVEL 2 BASE: Performed by: SURGERY

## 2022-05-05 PROCEDURE — 6370000000 HC RX 637 (ALT 250 FOR IP): Performed by: SURGERY

## 2022-05-05 PROCEDURE — 3700000001 HC ADD 15 MINUTES (ANESTHESIA): Performed by: SURGERY

## 2022-05-05 PROCEDURE — A4217 STERILE WATER/SALINE, 500 ML: HCPCS | Performed by: SURGERY

## 2022-05-05 PROCEDURE — 6370000000 HC RX 637 (ALT 250 FOR IP): Performed by: ANESTHESIOLOGY

## 2022-05-05 PROCEDURE — 7100000010 HC PHASE II RECOVERY - FIRST 15 MIN: Performed by: SURGERY

## 2022-05-05 PROCEDURE — 2580000003 HC RX 258: Performed by: ANESTHESIOLOGY

## 2022-05-05 PROCEDURE — 6360000002 HC RX W HCPCS: Performed by: NURSE ANESTHETIST, CERTIFIED REGISTERED

## 2022-05-05 RX ORDER — ACETAMINOPHEN 325 MG/1
650 TABLET ORAL
Status: COMPLETED | OUTPATIENT
Start: 2022-05-05 | End: 2022-05-05

## 2022-05-05 RX ORDER — PROPOFOL 10 MG/ML
INJECTION, EMULSION INTRAVENOUS CONTINUOUS PRN
Status: DISCONTINUED | OUTPATIENT
Start: 2022-05-05 | End: 2022-05-05 | Stop reason: SDUPTHER

## 2022-05-05 RX ORDER — FENTANYL CITRATE 50 UG/ML
25 INJECTION, SOLUTION INTRAMUSCULAR; INTRAVENOUS EVERY 5 MIN PRN
Status: DISCONTINUED | OUTPATIENT
Start: 2022-05-05 | End: 2022-05-05 | Stop reason: HOSPADM

## 2022-05-05 RX ORDER — SODIUM CHLORIDE 9 MG/ML
INJECTION, SOLUTION INTRAVENOUS PRN
Status: DISCONTINUED | OUTPATIENT
Start: 2022-05-05 | End: 2022-05-05 | Stop reason: HOSPADM

## 2022-05-05 RX ORDER — BUPIVACAINE HYDROCHLORIDE 5 MG/ML
INJECTION, SOLUTION EPIDURAL; INTRACAUDAL
Status: COMPLETED | OUTPATIENT
Start: 2022-05-05 | End: 2022-05-05

## 2022-05-05 RX ORDER — ONDANSETRON 2 MG/ML
4 INJECTION INTRAMUSCULAR; INTRAVENOUS
Status: DISCONTINUED | OUTPATIENT
Start: 2022-05-05 | End: 2022-05-05 | Stop reason: HOSPADM

## 2022-05-05 RX ORDER — SODIUM CHLORIDE 9 MG/ML
INJECTION, SOLUTION INTRAVENOUS CONTINUOUS
Status: DISCONTINUED | OUTPATIENT
Start: 2022-05-05 | End: 2022-05-05 | Stop reason: HOSPADM

## 2022-05-05 RX ORDER — EPHEDRINE SULFATE/0.9% NACL/PF 50 MG/5 ML
SYRINGE (ML) INTRAVENOUS PRN
Status: DISCONTINUED | OUTPATIENT
Start: 2022-05-05 | End: 2022-05-05 | Stop reason: SDUPTHER

## 2022-05-05 RX ORDER — ONDANSETRON 2 MG/ML
INJECTION INTRAMUSCULAR; INTRAVENOUS PRN
Status: DISCONTINUED | OUTPATIENT
Start: 2022-05-05 | End: 2022-05-05 | Stop reason: SDUPTHER

## 2022-05-05 RX ORDER — MAGNESIUM HYDROXIDE 1200 MG/15ML
LIQUID ORAL CONTINUOUS PRN
Status: DISCONTINUED | OUTPATIENT
Start: 2022-05-05 | End: 2022-05-05 | Stop reason: HOSPADM

## 2022-05-05 RX ORDER — SODIUM CHLORIDE 0.9 % (FLUSH) 0.9 %
5-40 SYRINGE (ML) INJECTION PRN
Status: DISCONTINUED | OUTPATIENT
Start: 2022-05-05 | End: 2022-05-05 | Stop reason: HOSPADM

## 2022-05-05 RX ORDER — PROPOFOL 10 MG/ML
INJECTION, EMULSION INTRAVENOUS PRN
Status: DISCONTINUED | OUTPATIENT
Start: 2022-05-05 | End: 2022-05-05 | Stop reason: SDUPTHER

## 2022-05-05 RX ORDER — SODIUM CHLORIDE 0.9 % (FLUSH) 0.9 %
5-40 SYRINGE (ML) INJECTION EVERY 12 HOURS SCHEDULED
Status: DISCONTINUED | OUTPATIENT
Start: 2022-05-05 | End: 2022-05-05 | Stop reason: HOSPADM

## 2022-05-05 RX ORDER — FENTANYL CITRATE 50 UG/ML
INJECTION, SOLUTION INTRAMUSCULAR; INTRAVENOUS PRN
Status: DISCONTINUED | OUTPATIENT
Start: 2022-05-05 | End: 2022-05-05 | Stop reason: SDUPTHER

## 2022-05-05 RX ORDER — LIDOCAINE HYDROCHLORIDE 20 MG/ML
INJECTION, SOLUTION INFILTRATION; PERINEURAL PRN
Status: DISCONTINUED | OUTPATIENT
Start: 2022-05-05 | End: 2022-05-05 | Stop reason: SDUPTHER

## 2022-05-05 RX ORDER — OXYCODONE HYDROCHLORIDE 5 MG/1
5 TABLET ORAL
Status: COMPLETED | OUTPATIENT
Start: 2022-05-05 | End: 2022-05-05

## 2022-05-05 RX ORDER — FENTANYL CITRATE 50 UG/ML
50 INJECTION, SOLUTION INTRAMUSCULAR; INTRAVENOUS EVERY 5 MIN PRN
Status: DISCONTINUED | OUTPATIENT
Start: 2022-05-05 | End: 2022-05-05 | Stop reason: HOSPADM

## 2022-05-05 RX ADMIN — FENTANYL CITRATE 50 MCG: 50 INJECTION INTRAMUSCULAR; INTRAVENOUS at 12:10

## 2022-05-05 RX ADMIN — FENTANYL CITRATE 25 MCG: 50 INJECTION, SOLUTION INTRAMUSCULAR; INTRAVENOUS at 13:30

## 2022-05-05 RX ADMIN — ACETAMINOPHEN 650 MG: 325 TABLET ORAL at 11:26

## 2022-05-05 RX ADMIN — ONDANSETRON 4 MG: 2 INJECTION INTRAMUSCULAR; INTRAVENOUS at 12:06

## 2022-05-05 RX ADMIN — Medication 10 MG: at 12:30

## 2022-05-05 RX ADMIN — OXYCODONE 5 MG: 5 TABLET ORAL at 14:35

## 2022-05-05 RX ADMIN — PROPOFOL 100 MCG/KG/MIN: 10 INJECTION, EMULSION INTRAVENOUS at 12:12

## 2022-05-05 RX ADMIN — SODIUM CHLORIDE: 9 INJECTION, SOLUTION INTRAVENOUS at 11:22

## 2022-05-05 RX ADMIN — CEFAZOLIN 2000 MG: 10 INJECTION, POWDER, FOR SOLUTION INTRAVENOUS at 12:15

## 2022-05-05 RX ADMIN — LIDOCAINE HYDROCHLORIDE 60 MG: 20 INJECTION, SOLUTION INFILTRATION; PERINEURAL at 12:10

## 2022-05-05 RX ADMIN — PROPOFOL 50 MG: 10 INJECTION, EMULSION INTRAVENOUS at 12:10

## 2022-05-05 ASSESSMENT — PULMONARY FUNCTION TESTS
PIF_VALUE: 1
PIF_VALUE: 0
PIF_VALUE: 1
PIF_VALUE: 0
PIF_VALUE: 1
PIF_VALUE: 0
PIF_VALUE: 1
PIF_VALUE: 0
PIF_VALUE: 1

## 2022-05-05 ASSESSMENT — PAIN DESCRIPTION - LOCATION
LOCATION: BREAST

## 2022-05-05 ASSESSMENT — PAIN DESCRIPTION - DESCRIPTORS
DESCRIPTORS: SORE

## 2022-05-05 ASSESSMENT — PAIN - FUNCTIONAL ASSESSMENT
PAIN_FUNCTIONAL_ASSESSMENT: ACTIVITIES ARE NOT PREVENTED
PAIN_FUNCTIONAL_ASSESSMENT: ACTIVITIES ARE NOT PREVENTED
PAIN_FUNCTIONAL_ASSESSMENT: 0-10

## 2022-05-05 ASSESSMENT — PAIN SCALES - GENERAL
PAINLEVEL_OUTOF10: 5
PAINLEVEL_OUTOF10: 3
PAINLEVEL_OUTOF10: 5
PAINLEVEL_OUTOF10: 7
PAINLEVEL_OUTOF10: 5
PAINLEVEL_OUTOF10: 5

## 2022-05-05 ASSESSMENT — PAIN DESCRIPTION - ONSET
ONSET: ON-GOING
ONSET: ON-GOING

## 2022-05-05 ASSESSMENT — PAIN DESCRIPTION - ORIENTATION
ORIENTATION: RIGHT

## 2022-05-05 ASSESSMENT — PAIN DESCRIPTION - PAIN TYPE
TYPE: SURGICAL PAIN

## 2022-05-05 ASSESSMENT — PAIN DESCRIPTION - FREQUENCY
FREQUENCY: CONTINUOUS
FREQUENCY: CONTINUOUS

## 2022-05-05 NOTE — PROGRESS NOTES
PACU Transfer Note    Vitals:    05/05/22 1408   BP: (!) 148/61   Pulse: 63   Resp: 18   Temp: 97.2 °F (36.2 °C)   SpO2: 96%       In: 400 [I.V.:300]  Out: -       Pain Level: 5 (requesting PO pain medication in phase 2.)    Report given to Receiving unit RN.    5/5/2022 2:09 PM      Electronically signed by Uyen Acosta RN on 5/5/2022 at 2:09 PM

## 2022-05-05 NOTE — ANESTHESIA PRE PROCEDURE
[facility] Department of Anesthesiology  Pre-Anesthesia Evaluation/Consultation       Name:  Chuy Delcid  : 1935  Age:  81 y.o. 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 Kimi Richey GERD (gastroesophageal reflux disease)    Mixed hyperlipidemia    Osteopenia of multiple sites - DEXA 2019    History of basal cell carcinoma - back of neck excised     Malignant neoplasm of upper-outer quadrant of right breast in female, estrogen receptor positive ([facility] [address]     Past Medical History:   Diagnosis Date    Arthritis     Cancer ([facility] [address] 2021    breast ca    GERD (gastroesophageal reflux disease)     High blood pressure     History of factor VIII deficiency     History of IBS     Hx of blood clots     PE    Hyperlipidemia     PONV (postoperative nausea and vomiting)     severe    UTI (urinary tract infection) 14    Wears glasses     reading     Past Surgical History:   Procedure Laterality Date    BREAST SURGERY Right 2022    RIGHT BREAST LUMPECTOMY performed by Carlota [facility] MD at [address] Right     HYSTERECTOMY      partial    NECK SURGERY      discectomy    ROTATOR CUFF REPAIR Left     SHOULDER ARTHROPLASTY Left 2014    TOTAL KNEE ARTHROPLASTY Left 2014    left knee     TOTAL KNEE ARTHROPLASTY Right 11/16/2012    TUMOR REMOVAL      tumor on thyroid ovary and tube    US BREAST NEEDLE BIOPSY RIGHT Right 3/28/2022    US BREAST NEEDLE BIOPSY RIGHT 3/28/2022 [facility]    VARICOSE VEIN SURGERY Bilateral      Social History     Tobacco Use    Smoking status: Former Smoker     Packs/day: 1.00     Types: Cigarettes    Smokeless tobacco: Never Used    Tobacco comment: quit over 50 years ago   Vaping Use    Vaping Use: Never used   Substance Use Topics    Alcohol use: Yes     Comment: social    Drug use: Never     Medications  Current Facility-Administered Medications on File Prior to Visit   Medication Dose Route Frequency Provider Last Rate Last Admin    0.9 % sodium chloride infusion   IntraVENous Continuous Demetria Suarez  mL/hr at 05/05/22 1122 New Bag at 05/05/22 1122    sodium chloride flush 0.9 % injection 5-40 mL  5-40 mL IntraVENous 2 times per day Demetria Suarez MD        sodium chloride flush 0.9 % injection 5-40 mL  5-40 mL IntraVENous PRN Demetria Suarez MD        0.9 % sodium chloride infusion   IntraVENous PRN Demetria Suarez MD        ceFAZolin (ANCEF) 2000 mg in dextrose 5 % 100 mL IVPB  2,000 mg IntraVENous Once Brad Zambrano MD         Current Outpatient Medications on File Prior to Visit   Medication Sig Dispense Refill    sucralfate (CARAFATE) 1 GM tablet 1/2 tab before meals and at night 180 tablet 1    atorvastatin (LIPITOR) 10 MG tablet Take 1 tablet by mouth once daily 90 tablet 1    vitamin C (ASCORBIC ACID) 500 MG tablet Take 500 mg by mouth daily      valsartan (DIOVAN) 160 MG tablet TAKE ONE TABLET BY MOUTH DAILY 90 tablet 1    fluticasone (FLONASE) 50 MCG/ACT nasal spray 2 sprays by Nasal route daily (Patient taking differently: 2 sprays by Nasal route daily as needed ) 16 g 2    mometasone (ELOCON) 0.1 % cream APPLY TOPICALLY DAILY TO THE AFFECTED AREAS AS NEEDED (Patient taking differently: daily as needed APPLY TOPICALLY DAILY TO THE AFFECTED AREAS AS NEEDED) 1 each 5    Multiple Vitamins-Minerals (MULTIVITAMIN ADULT) CHEW Take by mouth      Vitamin D (CHOLECALCIFEROL) 25 MCG (1000 UT) TABS tablet Take by mouth daily       metoprolol tartrate (LOPRESSOR) 50 MG tablet TAKE ONE TABLET BY MOUTH TWICE A  tablet 1    dicyclomine (BENTYL) 10 MG capsule Take 1 capsule by mouth 4 times daily (Patient taking differently: Take 10 mg by mouth 4 times daily as needed ) 360 capsule 1    lansoprazole (PREVACID) 30 MG capsule Take 30 mg by mouth daily.  aspirin 81 MG tablet Take 81 mg by mouth daily.  Misc Intestinal Radha Regulat (ALIGN) CAPS Take  by mouth daily.  CALCIUM ACETATE Take 1 tablet by mouth 2 times daily       GLUCOSAMINE CHONDROITIN COMPLX PO Take  by mouth 2 times daily. No current facility-administered medications for this visit. No current outpatient medications on file. Facility-Administered Medications Ordered in Other Visits   Medication Dose Route Frequency Provider Last Rate Last Admin    0.9 % sodium chloride infusion   IntraVENous Continuous Ankita Mcmahan  mL/hr at 22 1122 New Bag at 22 1122    sodium chloride flush 0.9 % injection 5-40 mL  5-40 mL IntraVENous 2 times per day Ankita Mcmahan MD        sodium chloride flush 0.9 % injection 5-40 mL  5-40 mL IntraVENous PRN Ankita Mcmahan MD        0.9 % sodium chloride infusion   IntraVENous PRN Ankita Mcmahan MD        ceFAZolin (ANCEF) 2000 mg in dextrose 5 % 100 mL IVPB  2,000 mg IntraVENous Once Christina Barcenas MD         Vital Signs (Current)   There were no vitals filed for this visit.   Vital Signs Statistics (for past 48 hrs)     Temp  Av °F (36.1 °C)  Min: 97 °F (36.1 °C)   Min taken time: 22 1108  Max: 97 °F (36.1 °C)   Max taken time: 22 1108  Pulse  Av  Min: 79   Min taken time: 22 1108  Max: 79   Max taken time: 22 1108  Resp  Av  Min: 12   Min taken time: 22 110  Max: 12   Max taken time: 22 1108  BP  Min: 169/76   Min taken time: 22 1108  Max: 169/76   Max taken time: 22 1108  SpO2  Av %  Min: 98 %   Min taken time: 22 110  Max: 98 %   Max taken time: 22 1108    BP Readings from Last 3 Encounters:   22 (!) 169/76   22 (!) 114/58   22 (!) 148/55     BMI  There is no height or weight on file to calculate BMI. Estimated body mass index is 23.11 kg/m² as calculated from the following:    Height as of an earlier encounter on 22: 5' 5\" (1.651 m). Weight as of an earlier encounter on 22: 138 lb 14.2 oz (63 kg). CBC   Lab Results   Component Value Date    WBC 4.9 2021    RBC 4.14 2021    HGB 13.1 2021    HCT 39.8 2021    MCV 96.0 2021    RDW 14.2 2021     2021     CMP    Lab Results   Component Value Date     2021    K 4.6 2021     2021    CO2 27 2021    BUN 25 2021    CREATININE 0.8 2021    GFRAA >60 2021    GFRAA >60 2012    AGRATIO 1.4 2021    LABGLOM >60 2021    GLUCOSE 105 2021    PROT 7.2 2021    CALCIUM 9.4 2021    BILITOT 0.4 2021    ALKPHOS 77 2021    AST 23 2021    ALT 14 2021     BMP    Lab Results   Component Value Date     2021    K 4.6 2021     2021    CO2 27 2021    BUN 25 2021    CREATININE 0.8 2021    CALCIUM 9.4 2021    GFRAA >60 2021    GFRAA >60 2012    LABGLOM >60 2021    GLUCOSE 105 2021     POCGlucose  No results for input(s): GLUCOSE in the last 72 hours.    [facility]    Lab Results   Component Value Date    PROTIME 10.0 2014    PROTIME 10.0 2010    INR 0.89 2014    APTT 30.8      HCG (If Applicable) No results found for: PREGTESTUR, PREGSERUM, HCG, HCGQUANT   ABGs No results found for: PHART, PO2ART, GLA1FKT, FSS9BIY, BEART, K7YDOSXS   Type & Screen (If Applicable)  Lab Results   Component Value Date    LABABO O 11/13/2012    LABRH Positive 11/13/2012                            BMI: Wt Readings from Last 3 Encounters:       NPO Status:                          Anesthesia Evaluation  Patient summary reviewed   history of anesthetic complications: PONV. Airway: Mallampati: III  TM distance: >3 FB   Neck ROM: full   Dental:    (+) upper dentures      Pulmonary:Negative Pulmonary ROS and normal exam                               Cardiovascular:  Exercise tolerance: good (>4 METS),   (+) hypertension:,       ECG reviewed  Rhythm: regular  Rate: normal           Beta Blocker:  Dose within 24 Hrs         Neuro/Psych:   Negative Neuro/Psych ROS              GI/Hepatic/Renal:   (+) GERD: well controlled,          ROS comment: IBS. Endo/Other: Negative Endo/Other ROS                    Abdominal:             Vascular:   + PVD, aortic or cerebral (GINO), DVT (Hx), . Other Findings:               Anesthesia Plan      MAC     ASA 3     (Discussed risks and benefits to sedation including nausea, vomiting, allergic reaction, headache, delayed cognitive recovery, stroke, heart attack, respiratory depression, and death which patient understood and agreed to proceed. The patient was given the opportunity to ask questions and all questions were answered to the patient's satisfaction.  )  Induction: intravenous. MIPS: Postoperative opioids intended and Prophylactic antiemetics administered. Anesthetic plan and risks discussed with patient and child/children. Plan discussed with CRNA. This pre-anesthesia assessment may be used as a history and physical.    DOS STAFF ADDENDUM:    Pt seen and examined, chart reviewed (including anesthesia, drug and allergy history). No interval changes to history and physical examination.   Anesthetic plan, risks, benefits, alternatives, and personnel involved discussed with patient. Questions and concerns addressed. Patient(family) verbalized an understanding and agrees to proceed.       Dennis Jim MD  May 5, 2022  11:55 AM

## 2022-05-05 NOTE — OP NOTE
Operative Note      Patient: Sherry Cormier  YOB: 1935  MRN: [id number]    Date of Procedure: 5/5/2022    Pre-Op Diagnosis: Malignant neoplasm of upper-outer quadrant of right breast in female, estrogen receptor positive    Post-Op Diagnosis: Same       Procedure(s):  REEXCISION OF RIGHT BREAST LUMPECTOMY CAVITY    Surgeon(s): Rodrick Cantrell MD    Assistant:   Surgical Assistant: Marsha Kingston; Denae Zhang    Anesthesia: Monitor Anesthesia Care    Estimated Blood Loss (mL): Minimal    Complications: None    Specimens:   ID Type Source Tests Collected by Time Destination   A : a. right breast anterior inferior margine, ink on new margine Tissue Breast SURGICAL PATHOLOGY Rodrick Cantrell MD 5/5/2022 1235    B : b. right breast additional inferior margine, ink on new true margine Tissue Breast SURGICAL PATHOLOGY Rodrick Cantrell MD 5/5/2022 1237        Implants:  * No implants in log *      Drains: * No LDAs found *    Findings: see op note    Detailed Description of Procedure:   Operative Report      Name:  Sherry Cormier   MRN:  [id number]  Date:  5/5/2022        PREOPERATIVE DIAGNOSIS: right breast cancer. POSTOPERATIVE DIAGNOSIS: same    PROCEDURE:re-excision of right lumpectomy cavity    SURGEON: Kentrell    ESTIMATED BLOOD LOSS:  Less than 25 mL    ASSISTANT: Denae WALKER    ANESTHESIA: MAC.    INDICATIONS FOR PROCEDURE: The patient is a 81 y.o. female who had  presented with breast cancer and underwent partial mastectomy for ductal carcinoma. She has a close anterior and inferior margin and is here now for re-excision of this margin. The risks, benefits and alternatives were discussed with the  patient. Questions were answered and she is agreeable to proceed. DESCRIPTION OF OPERATION: The patient was brought to the operating room and  placed on the OR table in the supine position.  She was given IV sedation and the right breast and axillary region were prepped and draped in the usual sterile fashion. The area around the prior incision was injected with local anesthetic. The incision was re-opened sharply and this was carried down through the skin and subcutaneous tissues. The anterior and inferior margin was exposed, and I used cautery to excise this margin, and the specimen was marked with ink on the new true margin and sent to pathology for permanent section. I also excised some additional inferior margin lower down, and this was also marked with ink and sent to pathology. Hemostasis was assured. The wound was irrigated, and then injected with local anesthetic, and then closed with deep layers of vicryl and the skin was re-approximated with 4-0 Monocryl and covered with surgical glue. Sponge, needle and instrument counts were correct per nursing. The patient tolerated the procedure well. She was taken to the  recovery room in stable condition.         Electronically signed by Trav Bonilla MD on 5/5/2022 at 12:49 PM

## 2022-05-05 NOTE — PROGRESS NOTES
Received from PACU. Admitted to Phase 2 care. Awake and alert, respirations easy and even. Oriented to room and surroundings. Complains of some pain.

## 2022-05-05 NOTE — ANESTHESIA POSTPROCEDURE EVALUATION
Department of Anesthesiology  Postprocedure Note    Patient: Milan Shankar  MRN: [id number]  YOB: 1935  Date of evaluation: 5/5/2022  Time:  1:37 PM     Procedure Summary     Date: 05/05/22 Room / Location: [address] [place] [address]    Anesthesia Start: 2605 Anesthesia Stop: 8857    Procedure: REEXCISION OF RIGHT BREAST LUMPECTOMY CAVITY (Right Breast) Diagnosis:       Malignant neoplasm of upper-outer quadrant of right breast in female, estrogen receptor positive ([facility] [address]      (Malignant neoplasm of upper-outer quadrant of right breast in female, estrogen receptor positive)    Surgeons: Minerva Quinonez MD Responsible Provider: Maritza Weldon MD    Anesthesia Type: MAC ASA Status: 3          Anesthesia Type: MAC    Piyush Phase I: Piyush Score: 10    Piyush Phase II:      Last vitals: Reviewed and per EMR flowsheets.        Anesthesia Post Evaluation    Patient location during evaluation: PACU  Patient participation: complete - patient participated  Level of consciousness: awake  Airway patency: patent  Nausea & Vomiting: no nausea and no vomiting  Cardiovascular status: blood pressure returned to baseline  Respiratory status: acceptable  Hydration status: stable  Multimodal analgesia pain management approach

## 2022-05-05 NOTE — PROGRESS NOTES
Tolerating oral intake. Medicated for pain. Discharge instructions given to patient and daughter. Verbalize understanding.

## 2022-05-05 NOTE — PROGRESS NOTES
Tolerating oral intake and being up in chair. States pain is tolerable and states she is ready to go home.

## 2022-05-05 NOTE — H&P
Update History & Physical    The patient's History and Physical of May 5, 2022 was reviewed with the patient and I examined the patient. There was no change. The surgical site was confirmed by the patient and me. Plan: The risks, benefits, expected outcome, and alternative to the recommended procedure have been discussed with the patient. Patient understands and wants to proceed with the procedure.      Electronically signed by Jignesh Hernandez MD on 5/5/2022 at 11:51 AM

## 2022-05-09 ENCOUNTER — TELEPHONE (OUTPATIENT)
Dept: SURGERY | Age: 87
End: 2022-05-09

## 2022-05-09 NOTE — TELEPHONE ENCOUNTER
Patients's daughter, [facility], called in for her mother's results due to her mother being antsy about her results. Said she would like to speak with Dr. Noemy Caicedo but then stated she was fine with a response on MyChart.

## 2022-05-11 ENCOUNTER — ANESTHESIA EVENT (OUTPATIENT)
Dept: OPERATING ROOM | Age: 87
End: 2022-05-11
Payer: MEDICARE

## 2022-05-11 ENCOUNTER — TELEPHONE (OUTPATIENT)
Dept: BREAST CENTER | Age: 87
End: 2022-05-11

## 2022-05-11 NOTE — PROGRESS NOTES
[address] [facility] time___1310_________        Surgery time___1440_________    Take the following medications with a sip of water: Follow your MD/Surgeons pre-procedure instructions regarding your medications     Do not eat or drink anything after 12:00 midnight prior to your surgery. This includes water chewing gum, mints and ice chips. You may brush your teeth and gargle the morning of your surgery, but do not swallow the water     Please see your family doctor/pediatrician for a history and physical and/or concerning medications. Bring any test results/reports from your physicians office. If you are under the care of a heart doctor or specialist doctor, please be aware that you may be asked to them for clearance    You may be asked to stop blood thinners such as Coumadin, Plavix, Fragmin, Lovenox, etc., or any anti-inflammatories such as:  Aspirin, Ibuprofen, Advil, Naproxen prior to your surgery. We also ask that you stop any OTC medications such as fish oil, vitamin E, glucosamine, garlic, Multivitamins, COQ 10, etc.    We ask that you do not smoke 24 hours prior to surgery  We ask that you do not  drink any alcoholic beverages 24 hours prior to surgery     You must make arrangements for a responsible adult to take you home after your surgery. For your safety you will not be allowed to leave alone or drive yourself home. Your surgery will be cancelled if you do not have a ride home. Also for your safety, it is strongly suggested that someone stay with you the first 24 hours after your surgery. A parent or legal guardian must accompany a child scheduled for surgery and plan to stay at the hospital until the child is discharged. Please do not bring other children with you. For your comfort, please wear simple loose fitting clothing to the hospital.  Please do not bring valuables.     Do not wear any make-up or nail polish on your fingers or toes      For your safety, please do not wear any jewelry or body piercing's on the day of surgery. All jewelry must be removed. If you have dentures, they will be removed before going to operating room. For your convenience, we will provide you with a container. If you wear contact lenses or glasses, they will be removed, please bring a case for them. If you have a living will and a durable power of  for healthcare, please bring in a copy. As part of our patient safety program to minimize surgical site infections, we ask you to do the following:    · Please notify your surgeon if you develop any illness between         now and the  day of your surgery. · This includes a cough, cold, fever, sore throat, nausea,         or vomiting, and diarrhea, etc.  ·  Please notify your surgeon if you experience dizziness, shortness         of breath or blurred vision between now and the time of your surgery. Do not shave your operative site 96 hours prior to surgery. For face and neck surgery, men may use an electric razor 48 hours   prior to surgery. You may shower the night before surgery or the morning of   your surgery with an antibacterial soap. You will need to bring a photo ID and insurance card    [facility] has an onsite pharmacy, would you like to utilize our pharmacy     If you will be staying overnight and use a C-pap machine, please bring   your C-pap to hospital     Our goal is to provide you with excellent care, therefore, visitors will be limited to two(2) in the room at a time so that we may focus on providing this care for you. Please contact pre-admission testing if you have any further questions. [facility] phone number:  [phone] PAT fax number:  [phone]  Please note these are generalized instructions for all surgical cases, you may be provided with more specific instructions according to your surgery.     C-Difficile admission screening and protocol:       * Admitted with diarrhea? [] YES    [x]  NO     *Prior history of C-Diff. In last 3 months? [] YES    [x]  NO     *Antibiotic use in the past 6-8 weeks? []  NO    [x]  YES                 If yes, which ANTIBIOTIC AND REASON_sinus infection____     *Prior hospitalization or nursing home in the last month? []  YES    [x]  NO        SAFETY FIRST. .call before you fall

## 2022-05-12 ENCOUNTER — ANESTHESIA (OUTPATIENT)
Dept: OPERATING ROOM | Age: 87
End: 2022-05-12
Payer: MEDICARE

## 2022-05-12 ENCOUNTER — HOSPITAL ENCOUNTER (OUTPATIENT)
Age: 87
Setting detail: OUTPATIENT SURGERY
Discharge: HOME | End: 2022-05-12
Attending: SURGERY | Admitting: SURGERY
Payer: MEDICARE

## 2022-05-12 VITALS
RESPIRATION RATE: 18 BRPM | HEIGHT: 65 IN | SYSTOLIC BLOOD PRESSURE: 133 MMHG | OXYGEN SATURATION: 96 % | BODY MASS INDEX: 22.63 KG/M2 | TEMPERATURE: 97.2 F | DIASTOLIC BLOOD PRESSURE: 69 MMHG | WEIGHT: 135.8 LBS | HEART RATE: 65 BPM

## 2022-05-12 VITALS
SYSTOLIC BLOOD PRESSURE: 96 MMHG | RESPIRATION RATE: 18 BRPM | DIASTOLIC BLOOD PRESSURE: 51 MMHG | OXYGEN SATURATION: 98 %

## 2022-05-12 DIAGNOSIS — Z17.0 MALIGNANT NEOPLASM OF UPPER-OUTER QUADRANT OF RIGHT BREAST IN FEMALE, ESTROGEN RECEPTOR POSITIVE (HCC): ICD-10-CM

## 2022-05-12 DIAGNOSIS — C50.411 MALIGNANT NEOPLASM OF UPPER-OUTER QUADRANT OF RIGHT BREAST IN FEMALE, ESTROGEN RECEPTOR POSITIVE (HCC): ICD-10-CM

## 2022-05-12 PROCEDURE — 7100000010 HC PHASE II RECOVERY - FIRST 15 MIN: Performed by: SURGERY

## 2022-05-12 PROCEDURE — 3600000002 HC SURGERY LEVEL 2 BASE: Performed by: SURGERY

## 2022-05-12 PROCEDURE — 2580000003 HC RX 258: Performed by: ANESTHESIOLOGY

## 2022-05-12 PROCEDURE — 6360000002 HC RX W HCPCS: Performed by: NURSE ANESTHETIST, CERTIFIED REGISTERED

## 2022-05-12 PROCEDURE — A4217 STERILE WATER/SALINE, 500 ML: HCPCS | Performed by: SURGERY

## 2022-05-12 PROCEDURE — 7100000011 HC PHASE II RECOVERY - ADDTL 15 MIN: Performed by: SURGERY

## 2022-05-12 PROCEDURE — 6370000000 HC RX 637 (ALT 250 FOR IP): Performed by: SURGERY

## 2022-05-12 PROCEDURE — 2500000003 HC RX 250 WO HCPCS: Performed by: NURSE ANESTHETIST, CERTIFIED REGISTERED

## 2022-05-12 PROCEDURE — 7100000000 HC PACU RECOVERY - FIRST 15 MIN: Performed by: SURGERY

## 2022-05-12 PROCEDURE — 2500000003 HC RX 250 WO HCPCS: Performed by: SURGERY

## 2022-05-12 PROCEDURE — 7100000001 HC PACU RECOVERY - ADDTL 15 MIN: Performed by: SURGERY

## 2022-05-12 PROCEDURE — 3700000000 HC ANESTHESIA ATTENDED CARE: Performed by: SURGERY

## 2022-05-12 PROCEDURE — 6360000002 HC RX W HCPCS: Performed by: SURGERY

## 2022-05-12 PROCEDURE — 19301 PARTIAL MASTECTOMY: CPT | Performed by: SURGERY

## 2022-05-12 PROCEDURE — 2709999900 HC NON-CHARGEABLE SUPPLY: Performed by: SURGERY

## 2022-05-12 PROCEDURE — 3700000001 HC ADD 15 MINUTES (ANESTHESIA): Performed by: SURGERY

## 2022-05-12 PROCEDURE — 88305 TISSUE EXAM BY PATHOLOGIST: CPT

## 2022-05-12 PROCEDURE — 2580000003 HC RX 258: Performed by: SURGERY

## 2022-05-12 PROCEDURE — 3600000012 HC SURGERY LEVEL 2 ADDTL 15MIN: Performed by: SURGERY

## 2022-05-12 RX ORDER — ONDANSETRON 2 MG/ML
4 INJECTION INTRAMUSCULAR; INTRAVENOUS
Status: DISCONTINUED | OUTPATIENT
Start: 2022-05-12 | End: 2022-05-12 | Stop reason: HOSPADM

## 2022-05-12 RX ORDER — PROPOFOL 10 MG/ML
INJECTION, EMULSION INTRAVENOUS CONTINUOUS PRN
Status: DISCONTINUED | OUTPATIENT
Start: 2022-05-12 | End: 2022-05-12 | Stop reason: SDUPTHER

## 2022-05-12 RX ORDER — BUPIVACAINE HYDROCHLORIDE 5 MG/ML
INJECTION, SOLUTION EPIDURAL; INTRACAUDAL
Status: COMPLETED | OUTPATIENT
Start: 2022-05-12 | End: 2022-05-12

## 2022-05-12 RX ORDER — SODIUM CHLORIDE 0.9 % (FLUSH) 0.9 %
5-40 SYRINGE (ML) INJECTION EVERY 12 HOURS SCHEDULED
Status: DISCONTINUED | OUTPATIENT
Start: 2022-05-12 | End: 2022-05-12 | Stop reason: HOSPADM

## 2022-05-12 RX ORDER — SODIUM CHLORIDE 9 MG/ML
INJECTION, SOLUTION INTRAVENOUS CONTINUOUS
Status: DISCONTINUED | OUTPATIENT
Start: 2022-05-12 | End: 2022-05-12 | Stop reason: HOSPADM

## 2022-05-12 RX ORDER — SODIUM CHLORIDE 9 MG/ML
INJECTION, SOLUTION INTRAVENOUS PRN
Status: DISCONTINUED | OUTPATIENT
Start: 2022-05-12 | End: 2022-05-12 | Stop reason: HOSPADM

## 2022-05-12 RX ORDER — ONDANSETRON 2 MG/ML
INJECTION INTRAMUSCULAR; INTRAVENOUS PRN
Status: DISCONTINUED | OUTPATIENT
Start: 2022-05-12 | End: 2022-05-12 | Stop reason: SDUPTHER

## 2022-05-12 RX ORDER — SODIUM CHLORIDE 0.9 % (FLUSH) 0.9 %
5-40 SYRINGE (ML) INJECTION PRN
Status: DISCONTINUED | OUTPATIENT
Start: 2022-05-12 | End: 2022-05-12 | Stop reason: HOSPADM

## 2022-05-12 RX ORDER — PHENYLEPHRINE HCL IN 0.9% NACL 1 MG/10 ML
SYRINGE (ML) INTRAVENOUS PRN
Status: DISCONTINUED | OUTPATIENT
Start: 2022-05-12 | End: 2022-05-12 | Stop reason: SDUPTHER

## 2022-05-12 RX ORDER — TRAMADOL HYDROCHLORIDE 50 MG/1
50 TABLET ORAL EVERY 6 HOURS PRN
Qty: 8 TABLET | Refills: 0 | Status: SHIPPED | OUTPATIENT
Start: 2022-05-12 | End: 2022-05-15

## 2022-05-12 RX ORDER — FENTANYL CITRATE 50 UG/ML
INJECTION, SOLUTION INTRAMUSCULAR; INTRAVENOUS PRN
Status: DISCONTINUED | OUTPATIENT
Start: 2022-05-12 | End: 2022-05-12 | Stop reason: SDUPTHER

## 2022-05-12 RX ORDER — UBIDECARENONE 10 MG
CAPSULE ORAL
COMMUNITY

## 2022-05-12 RX ORDER — ACETAMINOPHEN 325 MG/1
650 TABLET ORAL
Status: COMPLETED | OUTPATIENT
Start: 2022-05-12 | End: 2022-05-12

## 2022-05-12 RX ORDER — MAGNESIUM HYDROXIDE 1200 MG/15ML
LIQUID ORAL CONTINUOUS PRN
Status: DISCONTINUED | OUTPATIENT
Start: 2022-05-12 | End: 2022-05-12 | Stop reason: HOSPADM

## 2022-05-12 RX ORDER — LIDOCAINE HYDROCHLORIDE 20 MG/ML
INJECTION, SOLUTION EPIDURAL; INFILTRATION; INTRACAUDAL; PERINEURAL PRN
Status: DISCONTINUED | OUTPATIENT
Start: 2022-05-12 | End: 2022-05-12 | Stop reason: SDUPTHER

## 2022-05-12 RX ORDER — PROPOFOL 10 MG/ML
INJECTION, EMULSION INTRAVENOUS PRN
Status: DISCONTINUED | OUTPATIENT
Start: 2022-05-12 | End: 2022-05-12 | Stop reason: SDUPTHER

## 2022-05-12 RX ADMIN — SODIUM CHLORIDE: 9 INJECTION, SOLUTION INTRAVENOUS at 13:54

## 2022-05-12 RX ADMIN — PROPOFOL 50 MG: 10 INJECTION, EMULSION INTRAVENOUS at 14:02

## 2022-05-12 RX ADMIN — CEFAZOLIN 2000 MG: 10 INJECTION, POWDER, FOR SOLUTION INTRAVENOUS at 13:57

## 2022-05-12 RX ADMIN — FENTANYL CITRATE 25 MCG: 50 INJECTION INTRAMUSCULAR; INTRAVENOUS at 14:02

## 2022-05-12 RX ADMIN — ONDANSETRON 4 MG: 2 INJECTION INTRAMUSCULAR; INTRAVENOUS at 14:04

## 2022-05-12 RX ADMIN — Medication 100 MCG: at 14:29

## 2022-05-12 RX ADMIN — FENTANYL CITRATE 25 MCG: 50 INJECTION INTRAMUSCULAR; INTRAVENOUS at 14:04

## 2022-05-12 RX ADMIN — ACETAMINOPHEN 650 MG: 325 TABLET ORAL at 13:53

## 2022-05-12 RX ADMIN — LIDOCAINE HYDROCHLORIDE 100 MG: 20 INJECTION, SOLUTION EPIDURAL; INFILTRATION; INTRACAUDAL; PERINEURAL at 14:02

## 2022-05-12 RX ADMIN — FENTANYL CITRATE 25 MCG: 50 INJECTION INTRAMUSCULAR; INTRAVENOUS at 14:21

## 2022-05-12 RX ADMIN — PROPOFOL 160 MCG/KG/MIN: 10 INJECTION, EMULSION INTRAVENOUS at 14:02

## 2022-05-12 ASSESSMENT — PULMONARY FUNCTION TESTS
PIF_VALUE: 0
PIF_VALUE: 1
PIF_VALUE: 0

## 2022-05-12 ASSESSMENT — PAIN - FUNCTIONAL ASSESSMENT: PAIN_FUNCTIONAL_ASSESSMENT: 0-10

## 2022-05-12 ASSESSMENT — PAIN DESCRIPTION - DESCRIPTORS: DESCRIPTORS: ACHING

## 2022-05-12 NOTE — H&P
Update History & Physical    The patient's History and Physical of May 12, 2022 was reviewed with the patient and I examined the patient. There was no change. The surgical site was confirmed by the patient and me. Plan: The risks, benefits, expected outcome, and alternative to the recommended procedure have been discussed with the patient. Patient understands and wants to proceed with the procedure.      Electronically signed by Barndi Kaminski MD on 5/12/2022 at 1:30 PM

## 2022-05-12 NOTE — OP NOTE
Operative Note      Patient: Leticia Mora  YOB: 1935  MRN: [id number]    Date of Procedure: 5/12/2022    Pre-Op Diagnosis: Malignant neoplasm of upper-outer quadrant of right breast in female, estrogen receptor positive    Post-Op Diagnosis: Same       Procedure(s):  RE-EXCISION OF RIGHT BREAST LUMPECTOMY CAVITY    Surgeon(s): Da Pedersen MD    Assistant:   Surgical Assistant: Dorethea Hatchet, RN    Anesthesia: Monitor Anesthesia Care    Estimated Blood Loss (mL): Minimal    Complications: None    Specimens:   ID Type Source Tests Collected by Time Destination   A : A) Right breast, additional anterior inferior margin, ink is on the new true margin Tissue Breast [id number] David London MD 5/12/2022 1418        Implants:  * No implants in log *      Drains: * No LDAs found *    Findings: see op note    Detailed Description of Procedure:   Operative Report      Name:  Leticia Mora   MRN:  [id number]  Date:  5/12/2022        PREOPERATIVE DIAGNOSIS: right breast cancer. POSTOPERATIVE DIAGNOSIS: same    PROCEDURE:re-excision of right lumpectomy cavity    SURGEON: Kentrell    ESTIMATED BLOOD LOSS:  Less than 25 mL    ASSISTANT: FRITZ Vasquez    ANESTHESIA: MAC.    INDICATIONS FOR PROCEDURE: The patient is a 81 y.o. female who had  presented with breast cancer and underwent partial mastectomy for breast cancer. She has a close anterior/inferior margin and is here now for re-excision of this margin. The risks, benefits and alternatives were discussed with the  patient. Questions were answered and she is agreeable to proceed. DESCRIPTION OF OPERATION: The patient was brought to the operating room and  placed on the OR table in the supine position. She was given IV sedation and the right breast and axillary region were prepped and draped in the usual sterile fashion. The area around the prior incision was injected with local anesthetic.  The incision was re-opened sharply and this was carried down through the skin and subcutaneous tissues. The anterior/inferior margin was exposed, and I used cautery to excise this margin, and the specimen was marked with ink on the new true margin and sent to pathology for permanent section. Hemostasis was assured. The wound was irrigated, and then injected with local anesthetic, and then closed with deep layers of vicryl and the skin was re-approximated with 4-0 Monocryl and covered with surgical glue. Sponge, needle and instrument counts were correct per nursing. The patient tolerated the procedure well. She was taken to the  recovery room in stable condition.         Electronically signed by Kobe Russ MD on 5/12/2022 at 2:29 PM

## 2022-05-12 NOTE — ANESTHESIA PRE PROCEDURE
[facility] Department of Anesthesiology  Pre-Anesthesia Evaluation/Consultation       Name:  Erlinda Palomares  : 1935  Age:  81 y.o. 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 Rianna Lewis GERD (gastroesophageal reflux disease)    Mixed hyperlipidemia    Osteopenia of multiple sites - DEXA 2019    History of basal cell carcinoma - back of neck excised     Malignant neoplasm of upper-outer quadrant of right breast in female, estrogen receptor positive ([facility] [address]     Past Medical History:   Diagnosis Date    Arthritis     Cancer ([facility] [address] 2021    breast ca    GERD (gastroesophageal reflux disease)     High blood pressure     History of factor VIII deficiency     History of IBS     Hx of blood clots     PE    Hyperlipidemia     PONV (postoperative nausea and vomiting)     severe    UTI (urinary tract infection) 14    Wears glasses     reading     Past Surgical History:   Procedure Laterality Date    BREAST SURGERY Right 2022    RIGHT BREAST LUMPECTOMY performed by Sarina Serrano MD at [address] [facility] Right 2022    REEXCISION OF RIGHT BREAST LUMPECTOMY CAVITY performed by Sarina Serrano MD at [address] [place] [address] Right     HYSTERECTOMY      partial    NECK SURGERY      discectomy    ROTATOR CUFF Message noted. I think a small dose of amlodipine 2.5 mg will help. I have called it in for her. I can see her next week for follow up. REPAIR Left     SHOULDER ARTHROPLASTY Left 07/29/2014    TOTAL KNEE ARTHROPLASTY Left 01/29/2014    left knee     TOTAL KNEE ARTHROPLASTY Right 11/16/2012    TUMOR REMOVAL      tumor on thyroid ovary and tube    US BREAST NEEDLE BIOPSY RIGHT Right 3/28/2022    US BREAST NEEDLE BIOPSY RIGHT 3/28/2022 [facility] ULTRASOUND    VARICOSE VEIN SURGERY Bilateral      Social History     Tobacco Use    Smoking status: Former Smoker     Packs/day: 1.00     Types: Cigarettes    Smokeless tobacco: Never Used    Tobacco comment: quit over 50 years ago   Vaping Use    Vaping Use: Never used   Substance Use Topics    Alcohol use: Yes     Comment: social    Drug use: Never     Medications  Current Facility-Administered Medications on File Prior to Visit   Medication Dose Route Frequency Provider Last Rate Last Admin    ceFAZolin (ANCEF) 2000 mg in dextrose 5 % 100 mL IVPB  2,000 mg IntraVENous Once Mendez Mancilla MD        acetaminophen (TYLENOL) tablet 650 mg  650 mg Oral 60 Min Pre-Op Mendez Mancilla MD        0.9 % sodium chloride infusion   IntraVENous Continuous Aidan Rivera MD        sodium chloride flush 0.9 % injection 5-40 mL  5-40 mL IntraVENous 2 times per day Aidan Rivera MD        sodium chloride flush 0.9 % injection 5-40 mL  5-40 mL IntraVENous PRN Aidan Rivera MD        0.9 % sodium chloride infusion   IntraVENous PRN Aidan Rivera MD         Current Outpatient Medications on File Prior to Visit   Medication Sig Dispense Refill    Coenzyme Q10 10 MG CAPS Coenzyme Q10 Oral        active      sucralfate (CARAFATE) 1 GM tablet 1/2 tab before meals and at night 180 tablet 1    atorvastatin (LIPITOR) 10 MG tablet Take 1 tablet by mouth once daily 90 tablet 1    vitamin C (ASCORBIC ACID) 500 MG tablet Take 500 mg by mouth daily      valsartan (DIOVAN) 160 MG tablet TAKE ONE TABLET BY MOUTH DAILY 90 tablet 1    fluticasone (FLONASE) 50 MCG/ACT nasal spray 2 sprays by Nasal route daily (Patient taking differently: 2 sprays by Nasal route daily as needed ) 16 g 2    mometasone (ELOCON) 0.1 % cream APPLY TOPICALLY DAILY TO THE AFFECTED AREAS AS NEEDED (Patient taking differently: daily as needed APPLY TOPICALLY DAILY TO THE AFFECTED AREAS AS NEEDED) 1 each 5    Multiple Vitamins-Minerals (MULTIVITAMIN ADULT) CHEW Take by mouth      Vitamin D (CHOLECALCIFEROL) 25 MCG (1000 UT) TABS tablet Take by mouth daily       metoprolol tartrate (LOPRESSOR) 50 MG tablet TAKE ONE TABLET BY MOUTH TWICE A  tablet 1    dicyclomine (BENTYL) 10 MG capsule Take 1 capsule by mouth 4 times daily (Patient taking differently: Take 10 mg by mouth 4 times daily as needed ) 360 capsule 1    lansoprazole (PREVACID) 30 MG capsule Take 30 mg by mouth daily.  aspirin 81 MG tablet Take 81 mg by mouth daily.  Misc Intestinal Radha Regulat (ALIGN) CAPS Take  by mouth daily.  CALCIUM ACETATE Take 1 tablet by mouth 2 times daily       GLUCOSAMINE CHONDROITIN COMPLX PO Take  by mouth 2 times daily. No current facility-administered medications for this visit. No current outpatient medications on file.      Facility-Administered Medications Ordered in Other Visits   Medication Dose Route Frequency Provider Last Rate Last Admin    ceFAZolin (ANCEF) 2000 mg in dextrose 5 % 100 mL IVPB  2,000 mg IntraVENous Once Jaime Venegas MD        acetaminophen (TYLENOL) tablet 650 mg  650 mg Oral 60 Min Pre-Op Jaiem Venegas MD        0.9 % sodium chloride infusion   IntraVENous Continuous Henry Donahue MD        sodium chloride flush 0.9 % injection 5-40 mL  5-40 mL IntraVENous 2 times per day Henry Donahue MD        sodium chloride flush 0.9 % injection 5-40 mL  5-40 mL IntraVENous PRN Henry Donahue MD        0.9 % sodium chloride infusion   IntraVENous PRN Henry Donahue MD         Vital Signs (Current)   There were no vitals filed for this visit. Vital Signs Statistics (for past 48 hrs)     No data recorded    BP Readings from Last 3 Encounters:   05/05/22 111/73   05/05/22 (!) 152/68   04/21/22 (!) 114/58     BMI  There is no height or weight on file to calculate BMI. Estimated body mass index is 22.6 kg/m² as calculated from the following:    Height as of an earlier encounter on 5/12/22: 5' 5\" (1.651 m). Weight as of an earlier encounter on 5/12/22: 135 lb 12.9 oz (61.6 kg). CBC   Lab Results   Component Value Date    WBC 4.9 09/13/2021    RBC 4.14 09/13/2021    HGB 13.1 09/13/2021    HCT 39.8 09/13/2021    MCV 96.0 09/13/2021    RDW 14.2 09/13/2021     09/13/2021     CMP    Lab Results   Component Value Date     09/13/2021    K 4.6 09/13/2021     09/13/2021    CO2 27 09/13/2021    BUN 25 09/13/2021    CREATININE 0.8 09/13/2021    GFRAA >60 09/13/2021    GFRAA >60 11/13/2012    AGRATIO 1.4 09/13/2021    LABGLOM >60 09/13/2021    GLUCOSE 105 09/13/2021    PROT 7.2 09/13/2021    CALCIUM 9.4 09/13/2021    BILITOT 0.4 09/13/2021    ALKPHOS 77 09/13/2021    AST 23 09/13/2021    ALT 14 09/13/2021     BMP    Lab Results   Component Value Date     09/13/2021    K 4.6 09/13/2021     09/13/2021    CO2 27 09/13/2021    BUN 25 09/13/2021    CREATININE 0.8 09/13/2021    CALCIUM 9.4 09/13/2021    GFRAA >60 09/13/2021    GFRAA >60 11/13/2012    LABGLOM >60 09/13/2021    GLUCOSE 105 09/13/2021     POCGlucose  No results for input(s): GLUCOSE in the last 72 hours.    Coags    Lab Results   Component Value Date    PROTIME 10.0 07/22/2014    PROTIME 10.0 01/19/2010    INR 0.89 07/22/2014    APTT 30.8 37/53/6652     HCG (If Applicable) No results found for: PREGTESTUR, PREGSERUM, HCG, HCGQUANT   ABGs No results found for: PHART, PO2ART, DIK9YLY, HAH6CSO, BEART, N0YLVGSK   Type & Screen (If Applicable)  Lab Results   Component Value Date    LABABO O 11/13/2012    [address] Positive 11/13/2012 BMI: Wt Readings from Last 3 Encounters:       NPO Status:                          Anesthesia Evaluation  Patient summary reviewed   history of anesthetic complications: PONV. Airway: Mallampati: III  TM distance: >3 FB   Neck ROM: full   Dental:    (+) upper dentures      Pulmonary:Negative Pulmonary ROS                              Cardiovascular:  Exercise tolerance: good (>4 METS),   (+) hypertension:,       ECG reviewed               Beta Blocker:  Dose within 24 Hrs         Neuro/Psych:   Negative Neuro/Psych ROS              GI/Hepatic/Renal:   (+) GERD: well controlled,          ROS comment: IBS. Endo/Other: Negative Endo/Other ROS                    Abdominal:             Vascular:   + PVD, aortic or cerebral (GINO), DVT (Hx), . Other Findings:               Anesthesia Plan      MAC     ASA 3     (I discussed intravenous sedation to the patient's satisfaction including risks and alternatives. The patient agreed with the plan and has no further questions. Radha Pierce MD )  Induction: intravenous. MIPS: Postoperative opioids intended and Prophylactic antiemetics administered. Anesthetic plan and risks discussed with patient and child/children. Plan discussed with CRNA. This pre-anesthesia assessment may be used as a history and physical.    DOS STAFF ADDENDUM:    Pt seen and examined, chart reviewed (including anesthesia, drug and allergy history). No interval changes to history and physical examination. Anesthetic plan, risks, benefits, alternatives, and personnel involved discussed with patient. Questions and concerns addressed. Patient(family) verbalized an understanding and agrees to proceed.       Radha Pierce MD  May 12, 2022  1:37 PM

## 2022-05-12 NOTE — PROGRESS NOTES
Discharge instructions given to patient and daughter. Verbalize understanding. Script arrived from [employer] and given to patient.

## 2022-05-12 NOTE — ANESTHESIA POSTPROCEDURE EVALUATION
Department of Anesthesiology  Postprocedure Note    Patient: Ion Del Castillo  MRN: [id number]  YOB: 1935  Date of evaluation: 5/12/2022  Time:  3:21 PM     Procedure Summary     Date: 05/12/22 Room / Location: [address] [place] [address]    Anesthesia Start: 8855 Anesthesia Stop: 7039    Procedure: RE-EXCISION OF RIGHT BREAST LUMPECTOMY CAVITY (Right Breast) Diagnosis:       Malignant neoplasm of upper-outer quadrant of right breast in female, estrogen receptor positive ([address]      (Malignant neoplasm of upper-outer quadrant of right breast in female, estrogen receptor positive)    Surgeons: Mathews Landau, MD Responsible Provider: Jaylyn Lr MD    Anesthesia Type: MAC ASA Status: 3          Anesthesia Type: No value filed. Piyush Phase I: Piyush Score: 10    Piyush Phase II: Piyush Score: 10    Last vitals: Reviewed and per EMR flowsheets.        Anesthesia Post Evaluation    Patient location during evaluation: PACU  Level of consciousness: awake and alert  Airway patency: patent  Nausea & Vomiting: no nausea and no vomiting  Complications: no  Cardiovascular status: blood pressure returned to baseline  Respiratory status: acceptable  Hydration status: euvolemic  Comments: Postoperative Anesthesia Note    Name:    Ion Del Castillo  MRN:      [id number]    Patient Vitals in the past 12 hrs:  05/12/22 1502, BP:139/62, Temp:97.2 °F (36.2 °C), Temp src:Temporal, Pulse:61, Resp:18, SpO2:98 %  05/12/22 1455, BP:(!) 145/66, Temp:97.8 °F (36.6 °C), Temp src:Temporal, Pulse:85, Resp:16, SpO2:96 %  05/12/22 1451, BP:(!) 141/63, Pulse:75, Resp:16, SpO2:97 %  05/12/22 1445, BP:139/65, Pulse:64, Resp:22, SpO2:95 %  05/12/22 1440, BP:108/61, Pulse:63, Resp:16, SpO2:97 %  05/12/22 1438, BP:113/65, Temp:97.7 °F (36.5 °C), Temp src:Temporal, Pulse:68, Resp:17, SpO2:96 %  05/12/22 1340, BP:125/68, Temp:98 °F (36.7 °C), Temp src:Temporal, Pulse:68, Resp:18, SpO2:95 %  05/12/22 1320, Height:5' 5\" (1.651 m), Weight:135 lb 12.9 oz (61.6 kg)     LABS:    CBC  Lab Results       Component                Value               Date/Time                  WBC                      4.9                 09/13/2021 01:04 PM        HGB                      13.1                09/13/2021 01:04 PM        HCT                      39.8                09/13/2021 01:04 PM        PLT                      196                 09/13/2021 01:04 PM   RENAL  Lab Results       Component                Value               Date/Time                  NA                       145                 09/13/2021 01:04 PM        K                        4.6                 09/13/2021 01:04 PM        CL                       105                 09/13/2021 01:04 PM        CO2                      27                  09/13/2021 01:04 PM        BUN                      25 (H)              09/13/2021 01:04 PM        CREATININE               0.8                 09/13/2021 01:04 PM        GLUCOSE                  105 (H)             09/13/2021 01:04 PM   COAGS  Lab Results       Component                Value               Date/Time                  PROTIME                  10.0                07/22/2014 09:15 AM        PROTIME                  10.0                01/19/2010 09:50 AM        INR                      0.89                07/22/2014 09:15 AM        APTT                     30.8                07/22/2014 09:15 AM     Intake & Output:  @97EJ@    Nausea & Vomiting:  No    Level of Consciousness:  Awake    Pain Assessment:  Adequate analgesia    Anesthesia Complications:  No apparent anesthetic complications    SUMMARY      Vital signs stable  OK to discharge from Stage I post anesthesia care.   Care transferred from Anesthesiology department on discharge from perioperative area

## 2022-05-16 ENCOUNTER — HOSPITAL ENCOUNTER (OUTPATIENT)
Dept: WOMENS IMAGING | Age: 87
Discharge: HOME | End: 2022-05-16
Payer: MEDICARE

## 2022-05-16 DIAGNOSIS — M81.0 OSTEOPOROSIS, UNSPECIFIED OSTEOPOROSIS TYPE, UNSPECIFIED PATHOLOGICAL FRACTURE PRESENCE: ICD-10-CM

## 2022-05-16 PROCEDURE — 77080 DXA BONE DENSITY AXIAL: CPT

## 2022-05-25 ENCOUNTER — OFFICE VISIT (OUTPATIENT)
Dept: BREAST CENTER | Age: 87
End: 2022-05-25

## 2022-05-25 VITALS — DIASTOLIC BLOOD PRESSURE: 84 MMHG | SYSTOLIC BLOOD PRESSURE: 157 MMHG

## 2022-05-25 DIAGNOSIS — C50.411 MALIGNANT NEOPLASM OF UPPER-OUTER QUADRANT OF RIGHT BREAST IN FEMALE, ESTROGEN RECEPTOR POSITIVE (HCC): Primary | ICD-10-CM

## 2022-05-25 DIAGNOSIS — Z90.11 S/P PARTIAL MASTECTOMY, RIGHT: ICD-10-CM

## 2022-05-25 DIAGNOSIS — Z17.0 MALIGNANT NEOPLASM OF UPPER-OUTER QUADRANT OF RIGHT BREAST IN FEMALE, ESTROGEN RECEPTOR POSITIVE (HCC): Primary | ICD-10-CM

## 2022-05-25 PROCEDURE — 99024 POSTOP FOLLOW-UP VISIT: CPT | Performed by: SURGERY

## 2022-05-25 NOTE — PROGRESS NOTES
Subjective:      Patient ID: Mendel Palin is a 81 y.o. female. HPI   Chief Complaint   Patient presents with    Post-Op Check     Patient presents post op right breast surgery 5/12/22     Patient is s/p right lumpectomy for T1cNx ductal carcinoma, ER/HI positive, Her2 negative 4/21/2022, with 2 re-excisions. Wound healing well, instructed on wound care. Saw oncology, to start AI after radiation.  Follow up 6 months with right diagnostic mammogram.     Past Medical History:   Diagnosis Date    Arthritis     Cancer ([address] 04/2021    breast ca    GERD (gastroesophageal reflux disease)     High blood pressure     History of factor VIII deficiency     History of IBS     Hx of blood clots     PE    Hyperlipidemia     PONV (postoperative nausea and vomiting)     severe    UTI (urinary tract infection) 7/29/14    Wears glasses     reading       Past Surgical History:   Procedure Laterality Date    BREAST SURGERY Right 4/21/2022    RIGHT BREAST LUMPECTOMY performed by Artur Lindsay MD at [facility] Right 5/5/2022    REEXCISION OF RIGHT BREAST LUMPECTOMY CAVITY performed by Artur Lindsay MD at [facility] Right 5/12/2022    RE-EXCISION OF RIGHT BREAST LUMPECTOMY CAVITY performed by Artur Lindsay MD at [address] [place] [address] Right     HYSTERECTOMY      partial    NECK SURGERY      discectomy    ROTATOR CUFF REPAIR Left     SHOULDER ARTHROPLASTY Left 07/29/2014    TOTAL KNEE ARTHROPLASTY Left 01/29/2014    left knee     TOTAL KNEE ARTHROPLASTY Right 11/16/2012    TUMOR REMOVAL      tumor on thyroid ovary and tube    US BREAST NEEDLE BIOPSY RIGHT Right 3/28/2022    US BREAST NEEDLE BIOPSY RIGHT 3/28/2022 [facility] ULTRASOUND    VARICOSE VEIN SURGERY Bilateral        Current Outpatient Medications   Medication Sig Dispense Refill    Coenzyme Q10 10 MG CAPS Coenzyme Q10 Oral        active      sucralfate (CARAFATE) 1 GM tablet 1/2 tab before meals and at night 180 tablet 1    atorvastatin (LIPITOR) 10 MG tablet Take 1 tablet by mouth once daily 90 tablet 1    vitamin C (ASCORBIC ACID) 500 MG tablet Take 500 mg by mouth daily      valsartan (DIOVAN) 160 MG tablet TAKE ONE TABLET BY MOUTH DAILY 90 tablet 1    fluticasone (FLONASE) 50 MCG/ACT nasal spray 2 sprays by Nasal route daily (Patient taking differently: 2 sprays by Nasal route daily as needed ) 16 g 2    mometasone (ELOCON) 0.1 % cream APPLY TOPICALLY DAILY TO THE AFFECTED AREAS AS NEEDED (Patient taking differently: daily as needed APPLY TOPICALLY DAILY TO THE AFFECTED AREAS AS NEEDED) 1 each 5    Multiple Vitamins-Minerals (MULTIVITAMIN ADULT) CHEW Take by mouth      Vitamin D (CHOLECALCIFEROL) 25 MCG (1000 UT) TABS tablet Take by mouth daily       metoprolol tartrate (LOPRESSOR) 50 MG tablet TAKE ONE TABLET BY MOUTH TWICE A  tablet 1    dicyclomine (BENTYL) 10 MG capsule Take 1 capsule by mouth 4 times daily (Patient taking differently: Take 10 mg by mouth 4 times daily as needed ) 360 capsule 1    lansoprazole (PREVACID) 30 MG capsule Take 30 mg by mouth daily.  aspirin 81 MG tablet Take 81 mg by mouth daily.  Misc Intestinal Radha Regulat (ALIGN) CAPS Take  by mouth daily.  CALCIUM ACETATE Take 1 tablet by mouth 2 times daily       GLUCOSAMINE CHONDROITIN COMPLX PO Take  by mouth 2 times daily. No current facility-administered medications for this visit.        Social History     Socioeconomic History    Marital status:      Spouse name: Not on file    Number of children: Not on file    Years of education: Not on file    Highest education level: Not on file   Occupational History    Not on file   Tobacco Use    Smoking status: Former Smoker     Packs/day: 1.00     Types: Cigarettes    Smokeless tobacco: Never Used    Tobacco comment: quit over 50 years ago   Vaping Use    Vaping Use: Never used   Substance and Sexual Activity    Alcohol use: Yes     Comment: social    Drug use: Never    Sexual activity: Not Currently   Other Topics Concern    Not on file   Social History Narrative    Not on file     Social Determinants of Health     Financial Resource Strain:     Difficulty of Paying Living Expenses: Not on file   Food Insecurity:     Worried About Running Out of Food in the Last Year: Not on file    Heaven of Food in the Last Year: Not on file   Transportation Needs:     Lack of Transportation (Medical): Not on file    Lack of Transportation (Non-Medical): Not on file   Physical Activity:     Days of Exercise per Week: Not on file    Minutes of Exercise per Session: Not on file   Stress:     Feeling of Stress : Not on file   Social Connections:     Frequency of Communication with Friends and Family: Not on file    Frequency of Social Gatherings with Friends and Family: Not on file    Attends Jehovah's witness Services: Not on file    Active Member of [address] [place] [address] [employer] or Organizations: Not on file    Attends Club or Organization Meetings: Not on file    Marital Status: Not on file   Intimate Partner Violence:     Fear of Current or Ex-Partner: Not on file    Emotionally Abused: Not on file    Physically Abused: Not on file    Sexually Abused: Not on file   Housing Stability:     Unable to Pay for Housing in the Last Year: Not on file    Number of [place] in the Last Year: Not on file    Unstable Housing in the Last Year: Not on file       Objective:   Physical Exam      Assessment:      Diagnosis Orders   1. Malignant neoplasm of upper-outer quadrant of right breast in female, estrogen receptor positive ([facility]     2.  S/P partial mastectomy, right            Plan:     See above     Electronically signed by Tian Mallory MD on 5/25/2022 at 12:24 PM

## 2022-09-26 DIAGNOSIS — I10 ESSENTIAL HYPERTENSION: ICD-10-CM

## 2022-09-26 RX ORDER — VALSARTAN 160 MG/1
TABLET ORAL
Qty: 90 TABLET | Refills: 0 | Status: SHIPPED | OUTPATIENT
Start: 2022-09-26

## 2022-10-04 ENCOUNTER — NURSE ONLY (OUTPATIENT)
Dept: FAMILY MEDICINE CLINIC | Age: 87
End: 2022-10-04
Payer: MEDICARE

## 2022-10-04 DIAGNOSIS — Z23 NEEDS FLU SHOT: Primary | ICD-10-CM

## 2022-10-04 PROCEDURE — G0008 ADMIN INFLUENZA VIRUS VAC: HCPCS | Performed by: FAMILY MEDICINE

## 2022-10-04 PROCEDURE — 90694 VACC AIIV4 NO PRSRV 0.5ML IM: CPT | Performed by: FAMILY MEDICINE

## 2022-10-18 DIAGNOSIS — I10 ESSENTIAL HYPERTENSION: ICD-10-CM

## 2022-10-18 RX ORDER — METOPROLOL TARTRATE 50 MG/1
TABLET, FILM COATED ORAL
Qty: 180 TABLET | Refills: 0 | Status: SHIPPED | OUTPATIENT
Start: 2022-10-18

## 2022-10-25 DIAGNOSIS — E78.2 MIXED HYPERLIPIDEMIA: ICD-10-CM

## 2022-10-26 RX ORDER — ATORVASTATIN CALCIUM 10 MG/1
TABLET, FILM COATED ORAL
Qty: 90 TABLET | Refills: 0 | Status: SHIPPED | OUTPATIENT
Start: 2022-10-26

## 2022-10-27 ENCOUNTER — OFFICE VISIT (OUTPATIENT)
Dept: FAMILY MEDICINE CLINIC | Age: 87
End: 2022-10-27
Payer: MEDICARE

## 2022-10-27 VITALS
OXYGEN SATURATION: 96 % | RESPIRATION RATE: 16 BRPM | HEIGHT: 65 IN | HEART RATE: 61 BPM | WEIGHT: 136 LBS | BODY MASS INDEX: 22.66 KG/M2

## 2022-10-27 DIAGNOSIS — L57.0 ACTINIC KERATOSIS: Primary | ICD-10-CM

## 2022-10-27 DIAGNOSIS — L81.9 PIGMENTED SKIN LESION SUSPICIOUS FOR MALIGNANT NEOPLASM: ICD-10-CM

## 2022-10-27 PROCEDURE — 17000 DESTRUCT PREMALG LESION: CPT | Performed by: FAMILY MEDICINE

## 2022-10-27 SDOH — ECONOMIC STABILITY: FOOD INSECURITY: WITHIN THE PAST 12 MONTHS, YOU WORRIED THAT YOUR FOOD WOULD RUN OUT BEFORE YOU GOT MONEY TO BUY MORE.: NEVER TRUE

## 2022-10-27 SDOH — ECONOMIC STABILITY: FOOD INSECURITY: WITHIN THE PAST 12 MONTHS, THE FOOD YOU BOUGHT JUST DIDN'T LAST AND YOU DIDN'T HAVE MONEY TO GET MORE.: NEVER TRUE

## 2022-10-27 ASSESSMENT — SOCIAL DETERMINANTS OF HEALTH (SDOH): HOW HARD IS IT FOR YOU TO PAY FOR THE VERY BASICS LIKE FOOD, HOUSING, MEDICAL CARE, AND HEATING?: NOT HARD AT ALL

## 2022-10-27 NOTE — PROGRESS NOTES
10/27/2022    This is a 81 y.o. female   Chief Complaint   Patient presents with    Mass     Pt has a growth on her right arm that is hard as a rock      HPI    Here for growth on right forearm. Present for months. Not going away. Keeps bumping it against clothing. Hard and scaly, growing a sort of horn out of the middle    Review of Systems     Current Outpatient Medications   Medication Sig Dispense Refill    atorvastatin (LIPITOR) 10 MG tablet Take 1 tablet by mouth once daily 90 tablet 0    metoprolol tartrate (LOPRESSOR) 50 MG tablet Take 1 tablet by mouth twice daily 180 tablet 0    valsartan (DIOVAN) 160 MG tablet Take 1 tablet by mouth once daily 90 tablet 0    Coenzyme Q10 10 MG CAPS Coenzyme Q10 Oral        active      sucralfate (CARAFATE) 1 GM tablet 1/2 tab before meals and at night 180 tablet 1    vitamin C (ASCORBIC ACID) 500 MG tablet Take 500 mg by mouth daily      Multiple Vitamins-Minerals (MULTIVITAMIN ADULT) CHEW Take by mouth      Vitamin D (CHOLECALCIFEROL) 25 MCG (1000 UT) TABS tablet Take by mouth daily       lansoprazole (PREVACID) 30 MG delayed release capsule Take 30 mg by mouth daily. aspirin 81 MG tablet Take 81 mg by mouth daily. Misc Intestinal Radha Regulat (ALIGN) CAPS Take  by mouth daily. CALCIUM ACETATE Take 1 tablet by mouth 2 times daily       GLUCOSAMINE CHONDROITIN COMPLX PO Take  by mouth 2 times daily. No current facility-administered medications for this visit.        Pulse 61   Resp 16   Ht 5' 5\" (1.651 m)   Wt 136 lb (61.7 kg)   SpO2 96%   BMI 22.63 kg/m²     Physical Exam  Hyperkeratotic papule right forearm    Informed consent was obtained, cryotherapy applied to lesion the patient tolerated well    Wt Readings from Last 3 Encounters:   10/27/22 136 lb (61.7 kg)   05/12/22 135 lb 12.9 oz (61.6 kg)   05/05/22 138 lb 14.2 oz (63 kg)       BP Readings from Last 3 Encounters:   05/25/22 (!) 157/84   05/12/22 (!) 96/51   05/12/22 133/69 Assessment/Plan:  1. Actinic keratosis    2. Pigmented skin lesion suspicious for malignant neoplasm    Lesion appears to be an AK but could have progressed to a squamous cell.   If not resolving after cryotherapy we discussed excisional treatment

## 2022-11-30 ENCOUNTER — OFFICE VISIT (OUTPATIENT)
Dept: BREAST CENTER | Age: 87
End: 2022-11-30
Payer: MEDICARE

## 2022-11-30 ENCOUNTER — HOSPITAL ENCOUNTER (OUTPATIENT)
Dept: WOMENS IMAGING | Age: 87
Discharge: HOME | End: 2022-11-30
Payer: MEDICARE

## 2022-11-30 VITALS — HEART RATE: 72 BPM | SYSTOLIC BLOOD PRESSURE: 128 MMHG | DIASTOLIC BLOOD PRESSURE: 54 MMHG

## 2022-11-30 DIAGNOSIS — Z17.0 MALIGNANT NEOPLASM OF UPPER-OUTER QUADRANT OF RIGHT BREAST IN FEMALE, ESTROGEN RECEPTOR POSITIVE (HCC): ICD-10-CM

## 2022-11-30 DIAGNOSIS — C50.411 MALIGNANT NEOPLASM OF UPPER-OUTER QUADRANT OF RIGHT BREAST IN FEMALE, ESTROGEN RECEPTOR POSITIVE (HCC): Primary | ICD-10-CM

## 2022-11-30 DIAGNOSIS — C50.411 MALIGNANT NEOPLASM OF UPPER-OUTER QUADRANT OF RIGHT BREAST IN FEMALE, ESTROGEN RECEPTOR POSITIVE (HCC): ICD-10-CM

## 2022-11-30 DIAGNOSIS — Z17.0 MALIGNANT NEOPLASM OF UPPER-OUTER QUADRANT OF RIGHT BREAST IN FEMALE, ESTROGEN RECEPTOR POSITIVE (HCC): Primary | ICD-10-CM

## 2022-11-30 DIAGNOSIS — Z90.11 S/P PARTIAL MASTECTOMY, RIGHT: ICD-10-CM

## 2022-11-30 PROCEDURE — 1090F PRES/ABSN URINE INCON ASSESS: CPT | Performed by: SURGERY

## 2022-11-30 PROCEDURE — G8484 FLU IMMUNIZE NO ADMIN: HCPCS | Performed by: SURGERY

## 2022-11-30 PROCEDURE — G8420 CALC BMI NORM PARAMETERS: HCPCS | Performed by: SURGERY

## 2022-11-30 PROCEDURE — 1123F ACP DISCUSS/DSCN MKR DOCD: CPT | Performed by: SURGERY

## 2022-11-30 PROCEDURE — 1036F TOBACCO NON-USER: CPT | Performed by: SURGERY

## 2022-11-30 PROCEDURE — G8427 DOCREV CUR MEDS BY ELIG CLIN: HCPCS | Performed by: SURGERY

## 2022-11-30 PROCEDURE — 77065 DX MAMMO INCL CAD UNI: CPT

## 2022-11-30 PROCEDURE — 99213 OFFICE O/P EST LOW 20 MIN: CPT | Performed by: SURGERY

## 2022-11-30 NOTE — PROGRESS NOTES
11/30/2022    Chief Complaint   Patient presents with    6 Month Follow-Up     Patient presents for 6 month follow up with right breast mammogram        HPI Patient is s/p right lumpectomy for T1cNx ductal carcinoma, ER/NE positive, Her2 negative 4/21/2022, with 2 re-excisions. Wound healing well, instructed on wound care. Postop radiation, finished 7/2022. On AI, tolerating well. She has not felt any breast masses, no nipple discharge. She does have some mild right breast swelling and occasional twinges. Right diagnostic mammogram today [facility]IR[facility] 2. Current Outpatient Medications:     atorvastatin (LIPITOR) 10 MG tablet, Take 1 tablet by mouth once daily, Disp: 90 tablet, Rfl: 0    metoprolol tartrate (LOPRESSOR) 50 MG tablet, Take 1 tablet by mouth twice daily, Disp: 180 tablet, Rfl: 0    valsartan (DIOVAN) 160 MG tablet, Take 1 tablet by mouth once daily, Disp: 90 tablet, Rfl: 0    Coenzyme Q10 10 MG CAPS, Coenzyme Q10 Oral        active, Disp: , Rfl:     sucralfate (CARAFATE) 1 GM tablet, 1/2 tab before meals and at night, Disp: 180 tablet, Rfl: 1    vitamin C (ASCORBIC ACID) 500 MG tablet, Take 500 mg by mouth daily, Disp: , Rfl:     Multiple Vitamins-Minerals (MULTIVITAMIN ADULT) CHEW, Take by mouth, Disp: , Rfl:     Vitamin D (CHOLECALCIFEROL) 25 MCG (1000 UT) TABS tablet, Take by mouth daily , Disp: , Rfl:     lansoprazole (PREVACID) 30 MG delayed release capsule, Take 30 mg by mouth daily. , Disp: , Rfl:     aspirin 81 MG tablet, Take 81 mg by mouth daily. , Disp: , Rfl:     Misc Intestinal Radha Regulat (ALIGN) CAPS, Take  by mouth daily. , Disp: , Rfl:     CALCIUM ACETATE, Take 1 tablet by mouth 2 times daily , Disp: , Rfl:     GLUCOSAMINE CHONDROITIN COMPLX PO, Take  by mouth 2 times daily. , Disp: , Rfl:       Past Medical History:   Diagnosis Date    Arthritis     Cancer ([address] 04/2021    breast ca    GERD (gastroesophageal reflux disease)     High blood pressure     History of factor VIII deficiency     History of IBS     Hx of blood clots     PE    Hyperlipidemia     PONV (postoperative nausea and vomiting)     severe    UTI (urinary tract infection) 7/29/14    Wears glasses     reading       Past Surgical History:   Procedure Laterality Date    BREAST SURGERY Right 4/21/2022    RIGHT BREAST LUMPECTOMY performed by Candace Hadley MD at [address] [place] [address] Right 5/5/2022    REEXCISION OF RIGHT BREAST LUMPECTOMY CAVITY performed by Candace Hadley MD at [address] [place] [address] Right 5/12/2022    RE-EXCISION OF RIGHT BREAST LUMPECTOMY CAVITY performed by Candace Hadley MD at [address] Right     HYSTERECTOMY ([facility]      partial    NECK SURGERY      discectomy    ROTATOR CUFF REPAIR Left     SHOULDER ARTHROPLASTY Left 07/29/2014    TOTAL KNEE ARTHROPLASTY Left 01/29/2014    left knee     TOTAL KNEE ARTHROPLASTY Right 11/16/2012    TUMOR REMOVAL      tumor on thyroid ovary and tube    US BREAST NEEDLE BIOPSY RIGHT Right 3/28/2022    US BREAST NEEDLE BIOPSY RIGHT 3/28/2022 [facility] ULTRASOUND    VARICOSE VEIN SURGERY Bilateral        Social History     Tobacco Use    Smoking status: Former     Packs/day: 1.00     Types: Cigarettes    Smokeless tobacco: Never    Tobacco comments:     quit over 50 years ago   Vaping Use    Vaping Use: Never used   Substance Use Topics    Alcohol use: Yes     Comment: social    Drug use: Never         Physical Exam    Right breast - mild generalized swelling, well healed lumpectomy incision with thickening underneath, no masses, no nipple changes. Left breast - Normal contour, no masses, no nipple or skin changes. No cervical or axillary adenopathy. ASSESSMENT   Diagnosis Orders   1. Malignant neoplasm of upper-outer quadrant of right breast in female, estrogen receptor positive ([facility]        2. S/P partial mastectomy, right             PLAN    Mammogram was reviewed.  No masses on exam. At the present time, there is no concern for breast cancer recurrence. Healthy lifestyle modifications were reviewed with the patient. Instructed on daily massage to the breast to help with her swelling. Continue monthly self breast exam, f/u with me in 4 months with bilateral diagnostic mammogram.     I have spent 20 minutes reviewing previous notes, test results, and face to face with the patient discussing the diagnosis and importance of compliance with the treatment plan as well as documenting on the day of the visit 11/30/2022   An  electronic signature was used to authenticate this note.     --Christy Dominguez MD on 11/30/2022 at 1:14 PM

## 2022-11-30 NOTE — PATIENT INSTRUCTIONS
Mammogram results were discussed  Do daily massages to incision with lotion daily  Continue with monthly self breast checks  Return to office in March for bilateral diagnostic mammogram    Healthy Lifestyle Recommendations: healthy diet (decrease consumption of red meat, increase fresh fruits and vegetables), decreased alcohol consumption (less than 4 drinks/week), adequate sleep (goal 6-8 hours), routine exercise (goal 150 minutes/week or greater), weight control.

## 2022-12-28 DIAGNOSIS — I10 ESSENTIAL HYPERTENSION: ICD-10-CM

## 2022-12-28 RX ORDER — VALSARTAN 160 MG/1
TABLET ORAL
Qty: 90 TABLET | Refills: 0 | Status: SHIPPED | OUTPATIENT
Start: 2022-12-28

## 2023-02-01 ENCOUNTER — OFFICE VISIT (OUTPATIENT)
Dept: FAMILY MEDICINE CLINIC | Age: 88
End: 2023-02-01
Payer: MEDICARE

## 2023-02-01 VITALS
WEIGHT: 134.8 LBS | SYSTOLIC BLOOD PRESSURE: 130 MMHG | TEMPERATURE: 98 F | BODY MASS INDEX: 22.46 KG/M2 | DIASTOLIC BLOOD PRESSURE: 68 MMHG | HEIGHT: 65 IN | RESPIRATION RATE: 16 BRPM | HEART RATE: 79 BPM | OXYGEN SATURATION: 95 %

## 2023-02-01 DIAGNOSIS — J40 BRONCHITIS: Primary | ICD-10-CM

## 2023-02-01 LAB
INFLUENZA A ANTIBODY: NORMAL
INFLUENZA B ANTIBODY: NORMAL
Lab: NORMAL
PERFORMING INSTRUMENT: NORMAL
QC PASS/FAIL: NORMAL
SARS-COV-2, POC: NORMAL

## 2023-02-01 PROCEDURE — 1036F TOBACCO NON-USER: CPT | Performed by: NURSE PRACTITIONER

## 2023-02-01 PROCEDURE — G8420 CALC BMI NORM PARAMETERS: HCPCS | Performed by: NURSE PRACTITIONER

## 2023-02-01 PROCEDURE — 87426 SARSCOV CORONAVIRUS AG IA: CPT | Performed by: NURSE PRACTITIONER

## 2023-02-01 PROCEDURE — 1123F ACP DISCUSS/DSCN MKR DOCD: CPT | Performed by: NURSE PRACTITIONER

## 2023-02-01 PROCEDURE — 99213 OFFICE O/P EST LOW 20 MIN: CPT | Performed by: NURSE PRACTITIONER

## 2023-02-01 PROCEDURE — G8427 DOCREV CUR MEDS BY ELIG CLIN: HCPCS | Performed by: NURSE PRACTITIONER

## 2023-02-01 PROCEDURE — 87804 INFLUENZA ASSAY W/OPTIC: CPT | Performed by: NURSE PRACTITIONER

## 2023-02-01 PROCEDURE — 1090F PRES/ABSN URINE INCON ASSESS: CPT | Performed by: NURSE PRACTITIONER

## 2023-02-01 PROCEDURE — G8484 FLU IMMUNIZE NO ADMIN: HCPCS | Performed by: NURSE PRACTITIONER

## 2023-02-01 RX ORDER — PREDNISONE 20 MG/1
20 TABLET ORAL 2 TIMES DAILY
Qty: 10 TABLET | Refills: 0 | Status: SHIPPED | OUTPATIENT
Start: 2023-02-01 | End: 2023-02-06

## 2023-02-01 RX ORDER — ALBUTEROL SULFATE 90 UG/1
2 AEROSOL, METERED RESPIRATORY (INHALATION) 4 TIMES DAILY PRN
Qty: 18 G | Refills: 0 | Status: SHIPPED | OUTPATIENT
Start: 2023-02-01

## 2023-02-01 RX ORDER — AZITHROMYCIN 250 MG/1
TABLET, FILM COATED ORAL
Qty: 1 PACKET | Refills: 0 | Status: SHIPPED | OUTPATIENT
Start: 2023-02-01 | End: 2023-02-11

## 2023-02-01 SDOH — ECONOMIC STABILITY: FOOD INSECURITY: WITHIN THE PAST 12 MONTHS, YOU WORRIED THAT YOUR FOOD WOULD RUN OUT BEFORE YOU GOT MONEY TO BUY MORE.: NEVER TRUE

## 2023-02-01 SDOH — ECONOMIC STABILITY: INCOME INSECURITY: HOW HARD IS IT FOR YOU TO PAY FOR THE VERY BASICS LIKE FOOD, HOUSING, MEDICAL CARE, AND HEATING?: NOT HARD AT ALL

## 2023-02-01 SDOH — ECONOMIC STABILITY: FOOD INSECURITY: WITHIN THE PAST 12 MONTHS, THE FOOD YOU BOUGHT JUST DIDN'T LAST AND YOU DIDN'T HAVE MONEY TO GET MORE.: NEVER TRUE

## 2023-02-01 SDOH — ECONOMIC STABILITY: HOUSING INSECURITY
IN THE LAST 12 MONTHS, WAS THERE A TIME WHEN YOU DID NOT HAVE A STEADY PLACE TO SLEEP OR SLEPT IN A SHELTER (INCLUDING NOW)?: NO

## 2023-02-01 ASSESSMENT — PATIENT HEALTH QUESTIONNAIRE - PHQ9
2. FEELING DOWN, DEPRESSED OR HOPELESS: 0
1. LITTLE INTEREST OR PLEASURE IN DOING THINGS: 0
SUM OF ALL RESPONSES TO PHQ9 QUESTIONS 1 & 2: 0
SUM OF ALL RESPONSES TO PHQ QUESTIONS 1-9: 0

## 2023-02-01 ASSESSMENT — ENCOUNTER SYMPTOMS
COUGH: 1
WHEEZING: 1
RHINORRHEA: 1
ABDOMINAL PAIN: 0
SHORTNESS OF BREATH: 1
SINUS PRESSURE: 1
SORE THROAT: 0

## 2023-02-01 NOTE — PROGRESS NOTES
2/1/2023    This is a 81 y.o. female   Chief Complaint   Patient presents with    Cough     Started over a week ago. Cough. Congestion. Clear mucous. Wheezing. Slight fever. Body aches. Chills. Headache. Xochitl Danes HPI  Patient reports that she started with sinus symptoms and a cough over a week ago. Started to take mucinex without improvement in symptoms. +sinus congestion, PND, rhinorrhea.  +cough and with milky sputum production. +shortness of breath and wheezing. Thinks that she had a fever a couple of nights ago due to sweating, but did not check temp. Denies history of asthma or COPD. Did smoke, but quit 50 years ago.       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 Poonam Brunson    GERD (gastroesophageal reflux disease)    Mixed hyperlipidemia    Osteopenia of multiple sites - DEXA 1/2019    History of basal cell carcinoma - back of neck excised 2019    Malignant neoplasm of upper-outer quadrant of right breast in female, estrogen receptor positive ([facility]    S/P partial mastectomy, right          Current Outpatient Medications   Medication Sig Dispense Refill    valsartan (DIOVAN) 160 MG tablet Take 1 tablet by mouth once daily 90 tablet 0    atorvastatin (LIPITOR) 10 MG tablet Take 1 tablet by mouth once daily 90 tablet 0    metoprolol tartrate (LOPRESSOR) 50 MG tablet Take 1 tablet by mouth twice daily 180 tablet 0    Coenzyme Q10 10 MG CAPS Coenzyme Q10 Oral        active      sucralfate (CARAFATE) 1 GM tablet 1/2 tab before meals and at night 180 tablet 1    vitamin C (ASCORBIC ACID) 500 MG tablet Take 500 mg by mouth daily      Multiple Vitamins-Minerals (MULTIVITAMIN ADULT) CHEW Take by mouth      Vitamin D (CHOLECALCIFEROL) 25 MCG (1000 UT) TABS tablet Take by mouth daily       lansoprazole (PREVACID) 30 MG delayed release capsule Take 30 mg by mouth daily.      aspirin 81 MG tablet Take 81 mg by mouth daily.      Misc Intestinal Radha Regulat (ALIGN) CAPS Take  by mouth daily.      CALCIUM ACETATE Take 1 tablet by mouth 2 times daily       GLUCOSAMINE CHONDROITIN COMPLX PO Take  by mouth 2 times daily.       No current facility-administered medications for this visit.       Allergies   Allergen Reactions    Tramadol Other (See Comments)      GI Upset    Codeine Nausea And Vomiting    Morphine And Related Nausea And Vomiting       Review of Systems   Constitutional:  Positive for activity change and diaphoresis.   HENT:  Positive for congestion, postnasal drip, rhinorrhea and sinus pressure. Negative for sore throat.    Respiratory:  Positive for cough, shortness of breath and wheezing.    Cardiovascular:  Negative for chest pain and leg swelling.   Gastrointestinal:  Negative for abdominal pain.   Neurological:  Positive for headaches. Negative for dizziness.     Vitals:    02/01/23 1126   BP: 130/68   Site: Right Upper Arm   Position: Sitting   Cuff Size: Medium Adult   Pulse: 79   Resp: 16   Temp: 98 °F (36.7 °C)   TempSrc: Oral   SpO2: 95%   Weight: 134 lb 12.8 oz (61.1 kg)   Height: 5' 5\" (1.651 m)       Body mass index is 22.43 kg/m².     Wt Readings from Last 3 Encounters:   02/01/23 134 lb 12.8 oz (61.1 kg)   10/27/22 136 lb (61.7 kg)   05/12/22 135 lb 12.9 oz (61.6 kg)       BP Readings from Last 3 Encounters:   02/01/23 130/68   11/30/22 (!) 128/54   05/25/22 (!) 157/84       Physical Exam  Vitals and nursing note reviewed.   Constitutional:       General: She is not in acute distress.     Appearance: She is well-developed.   HENT:      Head: Normocephalic and atraumatic.      Right Ear: Tympanic membrane, ear canal and external ear normal. Tympanic membrane is not erythematous or bulging.      Left Ear:  Tympanic membrane, ear canal and external ear normal. Tympanic membrane is not erythematous or bulging. Nose:      Right Sinus: No maxillary sinus tenderness or frontal sinus tenderness. Left Sinus: No maxillary sinus tenderness or frontal sinus tenderness. Mouth/Throat:      Pharynx: Uvula midline. Posterior oropharyngeal erythema present. No oropharyngeal exudate. Cardiovascular:      Rate and Rhythm: Normal rate and regular rhythm. Heart sounds: Normal heart sounds. No murmur heard. No friction rub. No gallop. Pulmonary:      Effort: Pulmonary effort is normal. No respiratory distress. Breath sounds: Examination of the right-upper field reveals wheezing. Examination of the left-upper field reveals wheezing. Wheezing present. No decreased breath sounds, rhonchi or rales. Musculoskeletal:      Cervical back: Neck supple. Right lower leg: Edema present. Left lower leg: Edema present. Comments: Trace cruz ankle edema    Lymphadenopathy:      Head:      Right side of head: No submandibular adenopathy. Left side of head: No submandibular adenopathy. Cervical:      Right cervical: No superficial cervical adenopathy. Left cervical: No superficial cervical adenopathy. Skin:     General: Skin is warm and dry. Neurological:      Mental Status: She is alert and oriented to person, place, and time. Psychiatric:         Behavior: Behavior normal.       Estephaniaand Maren  Sadi Rivas was seen today for cough. Diagnoses and all orders for this visit:    Bronchitis  -     POCT COVID-19, Antigen- negative   -     POCT Influenza A/B- negative   -     azithromycin (ZITHROMAX Z-RASHAD) 250 MG tablet; Take 2 tablets on day 1, then 1 tablet daily for the next 4 days for treatment of bacterial infection. -     predniSONE (DELTASONE) 20 MG tablet; Take 1 tablet by mouth 2 times daily for 5 days  -     albuterol sulfate HFA (VENTOLIN HFA) 108 (90 Base) MCG/ACT inhaler;  Inhale 2 puffs into the lungs 4 times daily as needed for Wheezing or Shortness of Breath  Increase fluids  Rest  Patient is to call if symptoms worsen or fail to improve within the week. Patient's  passed away 2 weeks ago. She is sad, but coping ok. She has a very supportive family. She will call with any concerns. Return in about 2 weeks (around 2/15/2023), or if symptoms worsen or fail to improve, for breathing, with Dr. Reema Freeman.

## 2023-02-17 ENCOUNTER — OFFICE VISIT (OUTPATIENT)
Dept: FAMILY MEDICINE CLINIC | Age: 88
End: 2023-02-17

## 2023-02-17 VITALS
SYSTOLIC BLOOD PRESSURE: 118 MMHG | OXYGEN SATURATION: 97 % | WEIGHT: 137 LBS | RESPIRATION RATE: 16 BRPM | HEART RATE: 71 BPM | BODY MASS INDEX: 22.82 KG/M2 | HEIGHT: 65 IN | DIASTOLIC BLOOD PRESSURE: 72 MMHG

## 2023-02-17 DIAGNOSIS — R05.1 ACUTE COUGH: ICD-10-CM

## 2023-02-17 DIAGNOSIS — J01.90 ACUTE SINUSITIS, RECURRENCE NOT SPECIFIED, UNSPECIFIED LOCATION: Primary | ICD-10-CM

## 2023-02-17 RX ORDER — BENZONATATE 100 MG/1
100 CAPSULE ORAL 3 TIMES DAILY PRN
Qty: 30 CAPSULE | Refills: 0 | Status: SHIPPED | OUTPATIENT
Start: 2023-02-17 | End: 2023-02-27

## 2023-02-17 RX ORDER — AMOXICILLIN AND CLAVULANATE POTASSIUM 875; 125 MG/1; MG/1
1 TABLET, FILM COATED ORAL 2 TIMES DAILY
Qty: 10 TABLET | Refills: 0 | Status: SHIPPED | OUTPATIENT
Start: 2023-02-17 | End: 2023-02-22

## 2023-02-17 RX ORDER — DEXTROMETHORPHAN HYDROBROMIDE AND PROMETHAZINE HYDROCHLORIDE 15; 6.25 MG/5ML; MG/5ML
5 SYRUP ORAL NIGHTLY PRN
Qty: 118 ML | Refills: 0 | Status: SHIPPED | OUTPATIENT
Start: 2023-02-17 | End: 2023-02-24

## 2023-02-17 NOTE — PROGRESS NOTES
2/17/2023    This is a 81 y.o. female   Chief Complaint   Patient presents with    Cough     Pt has a lot of mucus post nasal drip and is taking mucinex and took al the meds, has a cough as well      HPI    Here for concerns for not feeling well    Symptoms have been present for the past 3 weeks.    -She was seen a couple of weeks ago and prescribed azithromycin and prednisone for bronchitis. This did help her symptoms significantly. However, she now has more sinus pain and pressure and issues with a cough. She denies shortness of breath. No fever or significant systemic symptoms    Review of Systems     Current Outpatient Medications   Medication Sig Dispense Refill    benzonatate (TESSALON) 100 MG capsule Take 1 capsule by mouth 3 times daily as needed for Cough 30 capsule 0    amoxicillin-clavulanate (AUGMENTIN) 875-125 MG per tablet Take 1 tablet by mouth 2 times daily for 5 days 10 tablet 0    promethazine-dextromethorphan (PROMETHAZINE-DM) 6.25-15 MG/5ML syrup Take 5 mLs by mouth nightly as needed for Cough 118 mL 0    albuterol sulfate HFA (VENTOLIN HFA) 108 (90 Base) MCG/ACT inhaler Inhale 2 puffs into the lungs 4 times daily as needed for Wheezing or Shortness of Breath 18 g 0    valsartan (DIOVAN) 160 MG tablet Take 1 tablet by mouth once daily 90 tablet 0    atorvastatin (LIPITOR) 10 MG tablet Take 1 tablet by mouth once daily 90 tablet 0    metoprolol tartrate (LOPRESSOR) 50 MG tablet Take 1 tablet by mouth twice daily 180 tablet 0    Coenzyme Q10 10 MG CAPS Coenzyme Q10 Oral        active      sucralfate (CARAFATE) 1 GM tablet 1/2 tab before meals and at night 180 tablet 1    vitamin C (ASCORBIC ACID) 500 MG tablet Take 500 mg by mouth daily      Multiple Vitamins-Minerals (MULTIVITAMIN ADULT) CHEW Take by mouth      Vitamin D (CHOLECALCIFEROL) 25 MCG (1000 UT) TABS tablet Take by mouth daily       lansoprazole (PREVACID) 30 MG delayed release capsule Take 30 mg by mouth daily.       aspirin 81 MG tablet Take 81 mg by mouth daily. Misc Intestinal Radha Regulat (ALIGN) CAPS Take  by mouth daily. CALCIUM ACETATE Take 1 tablet by mouth 2 times daily       GLUCOSAMINE CHONDROITIN COMPLX PO Take  by mouth 2 times daily. No current facility-administered medications for this visit. /72   Pulse 71   Resp 16   Ht 5' 5\" (1.651 m)   Wt 137 lb (62.1 kg)   SpO2 97%   BMI 22.80 kg/m²     Physical Exam  Constitutional:       Appearance: Normal appearance. HENT:      Head: Normocephalic and atraumatic. Comments: Sinus tenderness palpation     Right Ear: Tympanic membrane normal.      Left Ear: Tympanic membrane normal.      Nose: Congestion present. Mouth/Throat:      Comments: Postnasal drip  Cardiovascular:      Rate and Rhythm: Normal rate and regular rhythm. Pulmonary:      Effort: Pulmonary effort is normal.      Breath sounds: Normal breath sounds. Musculoskeletal:      Cervical back: Normal range of motion. Lymphadenopathy:      Cervical: No cervical adenopathy. Neurological:      Mental Status: She is alert. Wt Readings from Last 3 Encounters:   02/17/23 137 lb (62.1 kg)   02/01/23 134 lb 12.8 oz (61.1 kg)   10/27/22 136 lb (61.7 kg)     BP Readings from Last 3 Encounters:   02/17/23 118/72   02/01/23 130/68   11/30/22 (!) 128/54     Assessment/Plan:  1. Acute sinusitis, recurrence not specified, unspecified location  Likely secondary bacterial infection due to duration of symptoms and worsening sinus pain. - amoxicillin-clavulanate (AUGMENTIN) 875-125 MG per tablet; Take 1 tablet by mouth 2 times daily for 5 days  Dispense: 10 tablet; Refill: 0    2. Acute cough  From postnasal drip. No concern for pneumonia. Use cough medication as needed  - benzonatate (TESSALON) 100 MG capsule; Take 1 capsule by mouth 3 times daily as needed for Cough  Dispense: 30 capsule;  Refill: 0    Follow-up if symptoms worsen or fail to improve

## 2023-02-20 DIAGNOSIS — E78.2 MIXED HYPERLIPIDEMIA: ICD-10-CM

## 2023-02-21 RX ORDER — ATORVASTATIN CALCIUM 10 MG/1
TABLET, FILM COATED ORAL
Qty: 90 TABLET | Refills: 1 | Status: SHIPPED | OUTPATIENT
Start: 2023-02-21

## 2023-03-13 DIAGNOSIS — I10 ESSENTIAL HYPERTENSION: ICD-10-CM

## 2023-03-14 RX ORDER — VALSARTAN 160 MG/1
TABLET ORAL
Qty: 90 TABLET | Refills: 0 | Status: SHIPPED | OUTPATIENT
Start: 2023-03-14

## 2023-03-20 ENCOUNTER — OFFICE VISIT (OUTPATIENT)
Dept: BREAST CENTER | Age: 88
End: 2023-03-20
Payer: MEDICARE

## 2023-03-20 ENCOUNTER — HOSPITAL ENCOUNTER (OUTPATIENT)
Dept: WOMENS IMAGING | Age: 88
Discharge: HOME | End: 2023-03-20
Payer: MEDICARE

## 2023-03-20 VITALS
BODY MASS INDEX: 22.99 KG/M2 | HEIGHT: 65 IN | RESPIRATION RATE: 17 BRPM | OXYGEN SATURATION: 99 % | SYSTOLIC BLOOD PRESSURE: 116 MMHG | HEART RATE: 76 BPM | WEIGHT: 138 LBS | DIASTOLIC BLOOD PRESSURE: 68 MMHG

## 2023-03-20 DIAGNOSIS — C50.411 MALIGNANT NEOPLASM OF UPPER-OUTER QUADRANT OF RIGHT BREAST IN FEMALE, ESTROGEN RECEPTOR POSITIVE (HCC): ICD-10-CM

## 2023-03-20 DIAGNOSIS — C50.411 MALIGNANT NEOPLASM OF UPPER-OUTER QUADRANT OF RIGHT BREAST IN FEMALE, ESTROGEN RECEPTOR POSITIVE (HCC): Primary | ICD-10-CM

## 2023-03-20 DIAGNOSIS — Z17.0 MALIGNANT NEOPLASM OF UPPER-OUTER QUADRANT OF RIGHT BREAST IN FEMALE, ESTROGEN RECEPTOR POSITIVE (HCC): ICD-10-CM

## 2023-03-20 DIAGNOSIS — Z90.11 S/P PARTIAL MASTECTOMY, RIGHT: ICD-10-CM

## 2023-03-20 DIAGNOSIS — Z17.0 MALIGNANT NEOPLASM OF UPPER-OUTER QUADRANT OF RIGHT BREAST IN FEMALE, ESTROGEN RECEPTOR POSITIVE (HCC): Primary | ICD-10-CM

## 2023-03-20 DIAGNOSIS — Z12.31 VISIT FOR SCREENING MAMMOGRAM: ICD-10-CM

## 2023-03-20 PROCEDURE — G8484 FLU IMMUNIZE NO ADMIN: HCPCS | Performed by: SURGERY

## 2023-03-20 PROCEDURE — G0279 TOMOSYNTHESIS, MAMMO: HCPCS

## 2023-03-20 PROCEDURE — 1036F TOBACCO NON-USER: CPT | Performed by: SURGERY

## 2023-03-20 PROCEDURE — 99213 OFFICE O/P EST LOW 20 MIN: CPT | Performed by: SURGERY

## 2023-03-20 PROCEDURE — 1123F ACP DISCUSS/DSCN MKR DOCD: CPT | Performed by: SURGERY

## 2023-03-20 PROCEDURE — G8420 CALC BMI NORM PARAMETERS: HCPCS | Performed by: SURGERY

## 2023-03-20 PROCEDURE — 1090F PRES/ABSN URINE INCON ASSESS: CPT | Performed by: SURGERY

## 2023-03-20 PROCEDURE — G8427 DOCREV CUR MEDS BY ELIG CLIN: HCPCS | Performed by: SURGERY

## 2023-03-20 NOTE — PROGRESS NOTES
on 3/20/2023), Disp: 18 g, Rfl: 0      Past Medical History:   Diagnosis Date    Arthritis     Cancer ([facility] [address] 04/2021    breast ca    GERD (gastroesophageal reflux disease)     High blood pressure     History of factor VIII deficiency     History of IBS     Hx of blood clots     PE    Hyperlipidemia     PONV (postoperative nausea and vomiting)     severe    UTI (urinary tract infection) 7/29/14    Wears glasses     reading       Past Surgical History:   Procedure Laterality Date    BREAST SURGERY Right 4/21/2022    RIGHT BREAST LUMPECTOMY performed by Sandi Vazquez MD at [address] [place] [address] Right 5/5/2022    REEXCISION OF RIGHT BREAST LUMPECTOMY CAVITY performed by Sandi Vazquez MD at [address] [place] [address] Right 5/12/2022    RE-EXCISION OF RIGHT BREAST LUMPECTOMY CAVITY performed by Sandi Vazquez MD at [facility] Right     HYSTERECTOMY ([address] [place] [address])      partial    NECK SURGERY      discectomy    ROTATOR CUFF REPAIR Left     SHOULDER ARTHROPLASTY Left 07/29/2014    TOTAL KNEE ARTHROPLASTY Left 01/29/2014    left knee     TOTAL KNEE ARTHROPLASTY Right 11/16/2012    TUMOR REMOVAL      tumor on thyroid ovary and tube    US BREAST BIOPSY W LOC DEVICE 1ST LESION RIGHT Right 3/28/2022    US BREAST NEEDLE BIOPSY RIGHT 3/28/2022 [facility] ULTRASOUND    VARICOSE VEIN SURGERY Bilateral        Social History     Tobacco Use    Smoking status: Former     Packs/day: 1.00     Types: Cigarettes    Smokeless tobacco: Never    Tobacco comments:     quit over 50 years ago   Vaping Use    Vaping Use: Never used   Substance Use Topics    Alcohol use: Yes     Comment: social    Drug use: Never         Physical Exam    Right breast - mild generalized swelling, well healed lumpectomy incision with thickening underneath, no masses, no nipple changes. Left breast - Normal contour, no masses, no nipple or skin changes. No cervical or axillary adenopathy.     ASSESSMENT   Diagnosis Orders

## 2023-04-17 DIAGNOSIS — I10 ESSENTIAL HYPERTENSION: ICD-10-CM

## 2023-04-18 RX ORDER — METOPROLOL TARTRATE 50 MG/1
TABLET, FILM COATED ORAL
Qty: 180 TABLET | Refills: 0 | Status: SHIPPED | OUTPATIENT
Start: 2023-04-18

## 2023-06-07 RX ORDER — DICYCLOMINE HYDROCHLORIDE 10 MG/1
CAPSULE ORAL
Qty: 120 CAPSULE | Refills: 5 | Status: SHIPPED | OUTPATIENT
Start: 2023-06-07

## 2023-06-27 DIAGNOSIS — I10 ESSENTIAL HYPERTENSION: ICD-10-CM

## 2023-06-30 RX ORDER — VALSARTAN 160 MG/1
TABLET ORAL
Qty: 7 TABLET | Refills: 0 | Status: SHIPPED | OUTPATIENT
Start: 2023-06-30 | End: 2023-07-14 | Stop reason: SDUPTHER

## 2023-06-30 NOTE — TELEPHONE ENCOUNTER
Refilled for a week so she is able to have BMP drawn. Can refill more after BMP is reviewed. Patient is due for f/u on chronic conditions with Dr. Ziyad Villafuerte.

## 2023-07-05 DIAGNOSIS — I10 ESSENTIAL HYPERTENSION: ICD-10-CM

## 2023-07-05 LAB
ANION GAP SERPL CALCULATED.3IONS-SCNC: 9 MMOL/L (ref 3–16)
BUN SERPL-MCNC: 26 MG/DL (ref 7–20)
CALCIUM SERPL-MCNC: 8.9 MG/DL (ref 8.3–10.6)
CHLORIDE SERPL-SCNC: 105 MMOL/L (ref 99–110)
CO2 SERPL-SCNC: 28 MMOL/L (ref 21–32)
CREAT SERPL-MCNC: 1 MG/DL (ref 0.6–1.2)
GFR SERPLBLD CREATININE-BSD FMLA CKD-EPI: 54 ML/MIN/{1.73_M2}
GLUCOSE SERPL-MCNC: 97 MG/DL (ref 70–99)
POTASSIUM SERPL-SCNC: 5.2 MMOL/L (ref 3.5–5.1)
SODIUM SERPL-SCNC: 142 MMOL/L (ref 136–145)

## 2023-07-10 DIAGNOSIS — I10 ESSENTIAL HYPERTENSION: ICD-10-CM

## 2023-07-10 RX ORDER — VALSARTAN 160 MG/1
TABLET ORAL
Qty: 7 TABLET | Refills: 0 | OUTPATIENT
Start: 2023-07-10

## 2023-07-12 DIAGNOSIS — I10 ESSENTIAL HYPERTENSION: ICD-10-CM

## 2023-07-12 RX ORDER — VALSARTAN 160 MG/1
TABLET ORAL
Qty: 90 TABLET | Refills: 0 | OUTPATIENT
Start: 2023-07-12

## 2023-07-14 ENCOUNTER — OFFICE VISIT (OUTPATIENT)
Dept: FAMILY MEDICINE CLINIC | Age: 88
End: 2023-07-14

## 2023-07-14 VITALS
WEIGHT: 139 LBS | HEART RATE: 66 BPM | OXYGEN SATURATION: 96 % | DIASTOLIC BLOOD PRESSURE: 84 MMHG | SYSTOLIC BLOOD PRESSURE: 164 MMHG | BODY MASS INDEX: 23.13 KG/M2 | RESPIRATION RATE: 12 BRPM

## 2023-07-14 DIAGNOSIS — I10 ESSENTIAL HYPERTENSION: ICD-10-CM

## 2023-07-14 DIAGNOSIS — L57.0 ACTINIC KERATOSIS: Primary | ICD-10-CM

## 2023-07-14 RX ORDER — VALSARTAN 160 MG/1
160 TABLET ORAL DAILY
Qty: 90 TABLET | Refills: 1 | Status: SHIPPED | OUTPATIENT
Start: 2023-07-14

## 2023-07-14 NOTE — PROGRESS NOTES
Exam  Procedure: Cryotherapy was applied to a scaly, hyperkeratotic lesion on the right arm. Patient tolerated the procedure well    Wt Readings from Last 3 Encounters:   07/14/23 139 lb (63 kg)   03/20/23 138 lb (62.6 kg)   02/17/23 137 lb (62.1 kg)     BP Readings from Last 3 Encounters:   07/14/23 (!) 164/84   03/20/23 116/68   02/17/23 118/72     Assessment/Plan:  1. Actinic keratosis  Tolerated cryotherapy well today. It is a larger lesion so may progress to a squamous cell, discussed if treatment today is not successful would return for a shave biopsy  - SD DESTRUCTION PREMALIGNANT LESION 1ST    2. Essential hypertension  Elevated as she has been out of her medication. Refill sent  - valsartan (DIOVAN) 160 MG tablet; Take 1 tablet by mouth daily  Dispense: 90 tablet;  Refill: 1

## 2023-08-04 ENCOUNTER — OFFICE VISIT (OUTPATIENT)
Dept: FAMILY MEDICINE CLINIC | Age: 88
End: 2023-08-04

## 2023-08-04 VITALS
HEIGHT: 65 IN | OXYGEN SATURATION: 95 % | WEIGHT: 138 LBS | BODY MASS INDEX: 22.99 KG/M2 | RESPIRATION RATE: 16 BRPM | HEART RATE: 93 BPM

## 2023-08-04 DIAGNOSIS — C44.622 SQUAMOUS CELL CARCINOMA OF SKIN OF RIGHT UPPER ARM: Primary | ICD-10-CM

## 2023-08-04 DIAGNOSIS — D49.2 NEOPLASM OF UNSPECIFIED BEHAVIOR OF BONE, SOFT TISSUE, AND SKIN: ICD-10-CM

## 2023-08-04 NOTE — PROGRESS NOTES
Shave Biopsy Procedure Note    Pre-operative Diagnosis: Squamous cell carcinoma    Post-operative Diagnosis: normal    Locations: R upper arm    Indications: diagnostic    Anesthesia: Lidocaine 1% with epinephrine without added sodium bicarbonate    Procedure Details   History of allergy to iodine: no    Patient informed of the risks (including bleeding and infection) and benefits of the   procedure and Verbal informed consent obtained. The lesion and surrounding area were given a sterile prep using betadine and draped in the usual sterile fashion. A scalpel was used to shave an area of skin approximately 2cm by 2cm. Hemostasis achieved with silver nirtate. Antibiotic ointment and a sterile dressing applied. The specimen was sent for pathologic examination. The patient tolerated the procedure well. EBL: minimal ml    Findings:  As above    Condition:  Stable    Complications:  none. Plan:  1. Instructed to keep the wound dry and covered for 24-48h and clean thereafter. 2. Warning signs of infection were reviewed. 3. Recommend OTC pain meds  4.  We will call with pathology results

## 2023-08-10 PROBLEM — C44.622 SQUAMOUS CELL CARCINOMA OF RIGHT UPPER EXTREMITY: Status: ACTIVE | Noted: 2023-08-10

## 2023-08-28 DIAGNOSIS — E78.2 MIXED HYPERLIPIDEMIA: ICD-10-CM

## 2023-08-28 RX ORDER — ATORVASTATIN CALCIUM 10 MG/1
TABLET, FILM COATED ORAL
Qty: 90 TABLET | Refills: 0 | Status: SHIPPED | OUTPATIENT
Start: 2023-08-28

## 2023-10-08 DIAGNOSIS — I10 ESSENTIAL HYPERTENSION: ICD-10-CM

## 2023-10-09 RX ORDER — METOPROLOL TARTRATE 50 MG/1
TABLET, FILM COATED ORAL
Qty: 180 TABLET | Refills: 0 | Status: SHIPPED | OUTPATIENT
Start: 2023-10-09

## 2023-10-09 RX ORDER — VALSARTAN 160 MG/1
160 TABLET ORAL DAILY
Qty: 90 TABLET | Refills: 0 | Status: SHIPPED | OUTPATIENT
Start: 2023-10-09

## 2023-11-27 DIAGNOSIS — E78.2 MIXED HYPERLIPIDEMIA: ICD-10-CM

## 2023-11-27 RX ORDER — ATORVASTATIN CALCIUM 10 MG/1
TABLET, FILM COATED ORAL
Qty: 90 TABLET | Refills: 0 | Status: SHIPPED | OUTPATIENT
Start: 2023-11-27

## 2023-11-29 RX ORDER — SUCRALFATE 1 G/1
TABLET ORAL
Qty: 180 TABLET | Refills: 0 | Status: SHIPPED | OUTPATIENT
Start: 2023-11-29

## 2024-01-06 DIAGNOSIS — I10 ESSENTIAL HYPERTENSION: ICD-10-CM

## 2024-01-08 RX ORDER — VALSARTAN 160 MG/1
160 TABLET ORAL DAILY
Qty: 90 TABLET | Refills: 0 | Status: SHIPPED | OUTPATIENT
Start: 2024-01-08

## 2024-01-15 ENCOUNTER — OFFICE VISIT (OUTPATIENT)
Dept: ORTHOPEDIC SURGERY | Age: 89
End: 2024-01-15
Payer: MEDICARE

## 2024-01-15 VITALS — WEIGHT: 138 LBS | HEIGHT: 65 IN | BODY MASS INDEX: 22.99 KG/M2

## 2024-01-15 DIAGNOSIS — M70.62 GREATER TROCHANTERIC BURSITIS OF LEFT HIP: ICD-10-CM

## 2024-01-15 DIAGNOSIS — M25.552 LEFT HIP PAIN: Primary | ICD-10-CM

## 2024-01-15 DIAGNOSIS — M48.061 SPINAL STENOSIS OF LUMBAR REGION, UNSPECIFIED WHETHER NEUROGENIC CLAUDICATION PRESENT: ICD-10-CM

## 2024-01-15 PROCEDURE — G8428 CUR MEDS NOT DOCUMENT: HCPCS | Performed by: PHYSICIAN ASSISTANT

## 2024-01-15 PROCEDURE — 1123F ACP DISCUSS/DSCN MKR DOCD: CPT | Performed by: PHYSICIAN ASSISTANT

## 2024-01-15 PROCEDURE — 99214 OFFICE O/P EST MOD 30 MIN: CPT | Performed by: PHYSICIAN ASSISTANT

## 2024-01-15 PROCEDURE — G8484 FLU IMMUNIZE NO ADMIN: HCPCS | Performed by: PHYSICIAN ASSISTANT

## 2024-01-15 PROCEDURE — 1036F TOBACCO NON-USER: CPT | Performed by: PHYSICIAN ASSISTANT

## 2024-01-15 PROCEDURE — 20610 DRAIN/INJ JOINT/BURSA W/O US: CPT | Performed by: PHYSICIAN ASSISTANT

## 2024-01-15 PROCEDURE — 1090F PRES/ABSN URINE INCON ASSESS: CPT | Performed by: PHYSICIAN ASSISTANT

## 2024-01-15 PROCEDURE — G8420 CALC BMI NORM PARAMETERS: HCPCS | Performed by: PHYSICIAN ASSISTANT

## 2024-01-15 RX ORDER — TRIAMCINOLONE ACETONIDE 40 MG/ML
40 INJECTION, SUSPENSION INTRA-ARTICULAR; INTRAMUSCULAR ONCE
Status: COMPLETED | OUTPATIENT
Start: 2024-01-15 | End: 2024-01-15

## 2024-01-15 RX ORDER — BUPIVACAINE HYDROCHLORIDE 2.5 MG/ML
2 INJECTION, SOLUTION INFILTRATION; PERINEURAL ONCE
Status: COMPLETED | OUTPATIENT
Start: 2024-01-15 | End: 2024-01-15

## 2024-01-15 RX ADMIN — BUPIVACAINE HYDROCHLORIDE 5 MG: 2.5 INJECTION, SOLUTION INFILTRATION; PERINEURAL at 13:44

## 2024-01-15 RX ADMIN — TRIAMCINOLONE ACETONIDE 40 MG: 40 INJECTION, SUSPENSION INTRA-ARTICULAR; INTRAMUSCULAR at 13:44

## 2024-01-15 NOTE — PROGRESS NOTES
History of present illness:   Ms. Maggie Cox is a pleasant 88 y.o. female kindly returned to the office for consultation regarding her low back pain, left lateral hip pain and right leg radicular pain.  She does have a history of trochanteric bursitis as well as lumbar stenosis.  Her pain returned over the past several months.  Pain has steadily worsened since onset.   She reports moderate low back pain, moderate left lateral hip pain and moderate right leg radicular pain.      She reports numbness and tingling right lower extremity.    She denies weakness of her left and right leg.  She denies bowel or bladder dysfunction and saddle anesthesia.   She can sit for a maximum of unlimited minutes and stand for a maximum 30-60 minutes.   Pain does disrupt her sleep.   Prior medications and treatment tried include Tylenol.      Past medical history:  Her past medical history has been reviewed.  Past Medical History:   Diagnosis Date    Arthritis     Cancer ([facility]) 04/2021    breast ca    GERD (gastroesophageal reflux disease)     High blood pressure     History of factor VIII deficiency     History of IBS     Hx of blood clots     PE    Hyperlipidemia     PONV (postoperative nausea and vomiting)     severe    UTI (urinary tract infection) 7/29/14    Wears glasses     reading       Her past surgical history has been reviewed.  Past Surgical History:   Procedure Laterality Date    BREAST SURGERY Right 4/21/2022    RIGHT BREAST LUMPECTOMY performed by Peggy Pfeiffer MD at [facility] OR    BREAST SURGERY Right 5/5/2022    REEXCISION OF RIGHT BREAST LUMPECTOMY CAVITY performed by Peggy Pfeiffer MD at [facility] OR    BREAST SURGERY Right 5/12/2022    RE-EXCISION OF RIGHT BREAST LUMPECTOMY CAVITY performed by Peggy Pfeiffer MD at [facility] OR    CARPAL TUNNEL RELEASE Right     HYSTERECTOMY (CERVIX STATUS UNKNOWN)      partial    NECK SURGERY      discectomy    ROTATOR CUFF REPAIR Left     SHOULDER ARTHROPLASTY Left 07/29/2014

## 2024-02-02 ENCOUNTER — HOSPITAL ENCOUNTER (OUTPATIENT)
Dept: MRI IMAGING | Age: 89
Discharge: HOME | End: 2024-02-02
Payer: MEDICARE

## 2024-02-02 DIAGNOSIS — M48.061 SPINAL STENOSIS OF LUMBAR REGION, UNSPECIFIED WHETHER NEUROGENIC CLAUDICATION PRESENT: ICD-10-CM

## 2024-02-02 PROCEDURE — 72148 MRI LUMBAR SPINE W/O DYE: CPT

## 2024-02-07 ENCOUNTER — HOSPITAL ENCOUNTER (OUTPATIENT)
Dept: GENERAL RADIOLOGY | Age: 89
Discharge: HOME | End: 2024-02-07
Payer: MEDICARE

## 2024-02-07 ENCOUNTER — HOSPITAL ENCOUNTER (OUTPATIENT)
Age: 89
Discharge: HOME | End: 2024-02-07
Payer: MEDICARE

## 2024-02-07 DIAGNOSIS — M43.16 SPONDYLOLISTHESIS OF LUMBAR REGION: ICD-10-CM

## 2024-02-07 DIAGNOSIS — M48.061 LUMBAR STENOSIS WITHOUT NEUROGENIC CLAUDICATION: ICD-10-CM

## 2024-02-07 PROCEDURE — 72110 X-RAY EXAM L-2 SPINE 4/>VWS: CPT

## 2024-02-28 NOTE — PLAN OF CARE
Not Met: [] Adjusted    TREATMENT PLAN     Frequency/Duration: 1-2x/week for  6-8  weeks for the following treatment interventions:    Interventions:  [x] Therapeutic exercise including: strength training, ROM, including postural re-education.   [x] NMR activation and proprioception, including postural re-education.    [x] Manual therapy as indicated to include: PROM, Gr I-IV mobilizations, and STM  [x] Modalities as needed that may include: Cryotherapy  [x] Patient education on joint protection, postural re-education, activity modification, progression of HEP.        [] Aquatic Therapy    Plan: POC initiated as per evaluation    Electronically Signed by Stacey Somers PT, DPT    Date: 03/04/2024    Note: Portions of this note have been templated and/or copied from initial evaluation, reassessments and prior notes for documentation efficiency.

## 2024-02-29 DIAGNOSIS — E78.2 MIXED HYPERLIPIDEMIA: ICD-10-CM

## 2024-02-29 RX ORDER — ATORVASTATIN CALCIUM 10 MG/1
TABLET, FILM COATED ORAL
Qty: 90 TABLET | Refills: 0 | Status: SHIPPED | OUTPATIENT
Start: 2024-02-29

## 2024-03-04 ENCOUNTER — HOSPITAL ENCOUNTER (OUTPATIENT)
Dept: PHYSICAL THERAPY | Age: 89
Setting detail: THERAPIES SERIES
Discharge: HOME | End: 2024-03-04
Payer: MEDICARE

## 2024-03-04 DIAGNOSIS — R68.89 DECREASED ACTIVITY TOLERANCE: ICD-10-CM

## 2024-03-04 DIAGNOSIS — Z78.9 NEED FOR HOME EXERCISE PROGRAM: ICD-10-CM

## 2024-03-04 DIAGNOSIS — R26.89 DECREASED FUNCTIONAL MOBILITY: Primary | ICD-10-CM

## 2024-03-04 DIAGNOSIS — R53.1 FUNCTIONAL WEAKNESS: ICD-10-CM

## 2024-03-04 DIAGNOSIS — M54.50 LOW BACK PAIN, UNSPECIFIED BACK PAIN LATERALITY, UNSPECIFIED CHRONICITY, UNSPECIFIED WHETHER SCIATICA PRESENT: ICD-10-CM

## 2024-03-04 PROCEDURE — 97112 NEUROMUSCULAR REEDUCATION: CPT

## 2024-03-04 PROCEDURE — 97161 PT EVAL LOW COMPLEX 20 MIN: CPT

## 2024-03-06 ENCOUNTER — APPOINTMENT (OUTPATIENT)
Dept: PHYSICAL THERAPY | Age: 89
End: 2024-03-06
Payer: MEDICARE

## 2024-03-07 ENCOUNTER — HOSPITAL ENCOUNTER (OUTPATIENT)
Dept: PHYSICAL THERAPY | Age: 89
Setting detail: THERAPIES SERIES
Discharge: HOME | End: 2024-03-07
Payer: MEDICARE

## 2024-03-07 PROCEDURE — 97112 NEUROMUSCULAR REEDUCATION: CPT

## 2024-03-07 PROCEDURE — 97110 THERAPEUTIC EXERCISES: CPT

## 2024-03-07 NOTE — FLOWSHEET NOTE
[facility]- Outpatient Rehabilitation and Therapy [address], [place] [address] office: [phone] fax: [phone]     Physical Therapy Initial Evaluation Certification      Dear Abdoul Zarate PA-C,    We had the pleasure of evaluating the following patient for physical therapy services at [facility].  A summary of our findings can be found in the initial assessment below.  This includes our plan of care.  If you have any questions or concerns regarding these findings, please do not hesitate to contact me at the office phone number listed above.  Thank you for the referral.     Physician Signature:_______________________________Date:__________________  By signing above (or electronic signature), therapist’s plan is approved by physician       Physical Therapy: TREATMENT/PROGRESS NOTE   Patient: Maggie Cox (89 y.o. female)   Examination Date: 2024   :  1935 MRN: [id number]   Visit #: 2   Insurance Allowable Auth Needed   Medicare  15 KX []Yes    [x]No    Insurance: Payor: MEDICARE / Plan: MEDICARE PART A AND B / Product Type: *No Product type* /   Insurance ID: [id number] - (Medicare)  Secondary Insurance (if applicable): BCBS   Treatment Diagnosis:     ICD-10-CM    1. Decreased functional mobility  R26.89       2. Decreased activity tolerance  R68.89       3. Functional weakness  R53.1       4. Low back pain, unspecified back pain laterality, unspecified chronicity, unspecified whether sciatica present  M54.50       5. Need for home exercise program  Z78.9          Medical Diagnosis:  Spondylolisthesis, site unspecified [M43.10]  Spondylolisthesis, lumbar region [M43.16]   Referring Physician: Abdoul Zarate PA-C  PCP: Abhishek Sam MD       Plan of care signed (Y/N):     Date of Patient follow up with Physician:      Progress Report/POC: EVAL today  Progress note due: 4/3/2024   POC update due: 24

## 2024-03-11 ENCOUNTER — HOSPITAL ENCOUNTER (OUTPATIENT)
Dept: PHYSICAL THERAPY | Age: 89
Setting detail: THERAPIES SERIES
Discharge: HOME | End: 2024-03-11
Payer: MEDICARE

## 2024-03-11 ENCOUNTER — OFFICE VISIT (OUTPATIENT)
Dept: FAMILY MEDICINE CLINIC | Age: 89
End: 2024-03-11
Payer: MEDICARE

## 2024-03-11 VITALS
HEART RATE: 64 BPM | TEMPERATURE: 98.3 F | OXYGEN SATURATION: 96 % | SYSTOLIC BLOOD PRESSURE: 122 MMHG | BODY MASS INDEX: 22.76 KG/M2 | RESPIRATION RATE: 18 BRPM | HEIGHT: 65 IN | DIASTOLIC BLOOD PRESSURE: 68 MMHG | WEIGHT: 136.6 LBS

## 2024-03-11 DIAGNOSIS — G25.0 ESSENTIAL TREMOR: Primary | ICD-10-CM

## 2024-03-11 DIAGNOSIS — I10 ESSENTIAL HYPERTENSION: ICD-10-CM

## 2024-03-11 PROCEDURE — G8420 CALC BMI NORM PARAMETERS: HCPCS | Performed by: FAMILY MEDICINE

## 2024-03-11 PROCEDURE — 1123F ACP DISCUSS/DSCN MKR DOCD: CPT | Performed by: FAMILY MEDICINE

## 2024-03-11 PROCEDURE — 1090F PRES/ABSN URINE INCON ASSESS: CPT | Performed by: FAMILY MEDICINE

## 2024-03-11 PROCEDURE — 97112 NEUROMUSCULAR REEDUCATION: CPT

## 2024-03-11 PROCEDURE — 1036F TOBACCO NON-USER: CPT | Performed by: FAMILY MEDICINE

## 2024-03-11 PROCEDURE — 99214 OFFICE O/P EST MOD 30 MIN: CPT | Performed by: FAMILY MEDICINE

## 2024-03-11 PROCEDURE — G8484 FLU IMMUNIZE NO ADMIN: HCPCS | Performed by: FAMILY MEDICINE

## 2024-03-11 PROCEDURE — 97110 THERAPEUTIC EXERCISES: CPT

## 2024-03-11 PROCEDURE — G8427 DOCREV CUR MEDS BY ELIG CLIN: HCPCS | Performed by: FAMILY MEDICINE

## 2024-03-11 RX ORDER — PROPRANOLOL HYDROCHLORIDE 80 MG/1
80 CAPSULE, EXTENDED RELEASE ORAL DAILY
Qty: 90 CAPSULE | Refills: 1 | Status: SHIPPED | OUTPATIENT
Start: 2024-03-11

## 2024-03-11 SDOH — ECONOMIC STABILITY: FOOD INSECURITY: WITHIN THE PAST 12 MONTHS, YOU WORRIED THAT YOUR FOOD WOULD RUN OUT BEFORE YOU GOT MONEY TO BUY MORE.: NEVER TRUE

## 2024-03-11 SDOH — ECONOMIC STABILITY: FOOD INSECURITY: WITHIN THE PAST 12 MONTHS, THE FOOD YOU BOUGHT JUST DIDN'T LAST AND YOU DIDN'T HAVE MONEY TO GET MORE.: NEVER TRUE

## 2024-03-11 SDOH — ECONOMIC STABILITY: INCOME INSECURITY: HOW HARD IS IT FOR YOU TO PAY FOR THE VERY BASICS LIKE FOOD, HOUSING, MEDICAL CARE, AND HEATING?: SOMEWHAT HARD

## 2024-03-11 SDOH — ECONOMIC STABILITY: TRANSPORTATION INSECURITY
IN THE PAST 12 MONTHS, HAS LACK OF TRANSPORTATION KEPT YOU FROM MEETINGS, WORK, OR FROM GETTING THINGS NEEDED FOR DAILY LIVING?: NO

## 2024-03-11 ASSESSMENT — PATIENT HEALTH QUESTIONNAIRE - PHQ9
SUM OF ALL RESPONSES TO PHQ9 QUESTIONS 1 & 2: 1
SUM OF ALL RESPONSES TO PHQ QUESTIONS 1-9: 1
1. LITTLE INTEREST OR PLEASURE IN DOING THINGS: 0
1. LITTLE INTEREST OR PLEASURE IN DOING THINGS: NOT AT ALL
2. FEELING DOWN, DEPRESSED OR HOPELESS: 1
SUM OF ALL RESPONSES TO PHQ QUESTIONS 1-9: 1
2. FEELING DOWN, DEPRESSED OR HOPELESS: SEVERAL DAYS
SUM OF ALL RESPONSES TO PHQ QUESTIONS 1-9: 1
SUM OF ALL RESPONSES TO PHQ QUESTIONS 1-9: 1
SUM OF ALL RESPONSES TO PHQ9 QUESTIONS 1 & 2: 1

## 2024-03-11 NOTE — FLOWSHEET NOTE
[facility]- Outpatient Rehabilitation and Therapy [address], [address], [place] [address] office: [phone] fax: [phone]       Physical Therapy: TREATMENT/PROGRESS NOTE   Patient: Maggie Cox (89 y.o. female)   Examination Date: 2024   :  1935 MRN: [id number]   Visit #: 3   Insurance Allowable Auth Needed   Medicare  15 KX []Yes    [x]No    Insurance: Payor: MEDICARE / Plan: MEDICARE PART A AND B / Product Type: *No Product type* /   Insurance ID: [id number] - (Medicare)  Secondary Insurance (if applicable): BCBS   Treatment Diagnosis:     ICD-10-CM    1. Decreased functional mobility  R26.89       2. Decreased activity tolerance  R68.89       3. Functional weakness  R53.1       4. Low back pain, unspecified back pain laterality, unspecified chronicity, unspecified whether sciatica present  M54.50       5. Need for home exercise program  Z78.9          Medical Diagnosis:  Spondylolisthesis, site unspecified [M43.10]  Spondylolisthesis, lumbar region [M43.16]   Referring Physician: Abdoul Zarate PA-C  PCP: Abhishek Sam MD       Plan of care signed (Y/N): YES    Date of Patient follow up with Physician:      Progress Report/POC: NO  Progress note due: 4/3/2024   POC update due: 24                                            Precautions/ Contra-indications:           Latex allergy:  NO  Pacemaker:    NO  Contraindications for Manipulation: NA  Date of Surgery: NA  Other:    Red Flags:  None    C-SSRS Triggered by Intake questionnaire:   [x] No, Questionnaire did not trigger screening.   [] Yes, Patient intake triggered further evaluation      [] C-SSRS Screening completed  [] PCP notified via Plan of Care  [] Emergency services notified     Preferred Language for Healthcare:   [x] English       [] other:    SUBJECTIVE EXAMINATION     Patient stated complaint: Patient reports that her back has been giving her some problems. Patient reports that her pain has been

## 2024-03-11 NOTE — PROGRESS NOTES
3/11/2024    This is a 89 y.o. female   Chief Complaint   Patient presents with    Tremors     Tremors have gotten worse for the last year.      HPI    Here for concerns for tremors  - going on for many years  - seems to be getting worse over he past year  - notices it when she is eating/drinking, feels like she is going to spill something  - feels like she can stop them if she tenses her muscles enough  - not impacting her walking  - notes tremors in hands, head, legs  - might get worse as the day goes on  - notes issues with pain in her back, wonders if this makes it worse    Review of Systems     Current Outpatient Medications   Medication Sig Dispense Refill    propranolol (INDERAL LA) 80 MG extended release capsule Take 1 capsule by mouth daily 90 capsule 1    metoprolol tartrate (LOPRESSOR) 25 MG tablet Take 1 tablet by mouth 2 times daily 28 tablet 0    atorvastatin (LIPITOR) 10 MG tablet Take 1 tablet by mouth once daily 90 tablet 0    valsartan (DIOVAN) 160 MG tablet Take 1 tablet by mouth once daily 90 tablet 0    sucralfate (CARAFATE) 1 GM tablet TAKE 1/2 (ONE-HALF) TABLET BY MOUTH BEFORE MEAL(S) AND AT NIGHT 180 tablet 0    dicyclomine (BENTYL) 10 MG capsule Take 1 capsule by mouth 4 times daily 120 capsule 5    albuterol sulfate HFA (VENTOLIN HFA) 108 (90 Base) MCG/ACT inhaler Inhale 2 puffs into the lungs 4 times daily as needed for Wheezing or Shortness of Breath 18 g 0    Coenzyme Q10 10 MG CAPS Coenzyme Q10 Oral        active      vitamin C (ASCORBIC ACID) 500 MG tablet Take 1 tablet by mouth daily      Multiple Vitamins-Minerals (MULTIVITAMIN ADULT) CHEW Take by mouth      Vitamin D (CHOLECALCIFEROL) 25 MCG (1000 UT) TABS tablet Take by mouth daily       lansoprazole (PREVACID) 30 MG delayed release capsule Take 1 capsule by mouth daily      aspirin 81 MG tablet Take 1 tablet by mouth daily      Misc Intestinal Radha Regulat (ALIGN) CAPS Take  by mouth daily.      CALCIUM ACETATE Take 1 tablet by

## 2024-03-13 ENCOUNTER — HOSPITAL ENCOUNTER (OUTPATIENT)
Dept: PHYSICAL THERAPY | Age: 89
Setting detail: THERAPIES SERIES
Discharge: HOME | End: 2024-03-13
Payer: MEDICARE

## 2024-03-13 PROCEDURE — 97112 NEUROMUSCULAR REEDUCATION: CPT

## 2024-03-13 PROCEDURE — 97110 THERAPEUTIC EXERCISES: CPT

## 2024-03-13 NOTE — FLOWSHEET NOTE
Cryotherapy  [x] Patient education on joint protection, postural re-education, activity modification, progression of HEP.        [] Aquatic Therapy    Plan: Cont POC- Continue emphasis/focus on exercise progression and improving soft tissue extensibility. Next visit plan to progress weights and add new exercises     Electronically Signed by Stacey Somers PT, DPT    Date: 03/13/2024    Note: Portions of this note have been templated and/or copied from initial evaluation, reassessments and prior notes for documentation efficiency.

## 2024-03-18 ENCOUNTER — HOSPITAL ENCOUNTER (OUTPATIENT)
Dept: PHYSICAL THERAPY | Age: 89
Setting detail: THERAPIES SERIES
Discharge: HOME | End: 2024-03-18
Payer: MEDICARE

## 2024-03-18 PROCEDURE — 97112 NEUROMUSCULAR REEDUCATION: CPT

## 2024-03-18 PROCEDURE — 97110 THERAPEUTIC EXERCISES: CPT

## 2024-03-18 NOTE — FLOWSHEET NOTE
[facility]- Outpatient Rehabilitation and Therapy [address], [address], [place] [address] office: [phone] fax: [phone]       Physical Therapy: TREATMENT/PROGRESS NOTE   Patient: Maggie Cox (89 y.o. female)   Examination Date: 2024   :  1935 MRN: [id number]   Visit #: 5   Insurance Allowable Auth Needed   Medicare  15 KX []Yes    [x]No    Insurance: Payor: MEDICARE / Plan: MEDICARE PART A AND B / Product Type: *No Product type* /   Insurance ID: [id number] - (Medicare)  Secondary Insurance (if applicable): BCBS   Treatment Diagnosis:     ICD-10-CM    1. Decreased functional mobility  R26.89       2. Decreased activity tolerance  R68.89       3. Functional weakness  R53.1       4. Low back pain, unspecified back pain laterality, unspecified chronicity, unspecified whether sciatica present  M54.50       5. Need for home exercise program  Z78.9          Medical Diagnosis:  Spondylolisthesis, site unspecified [M43.10]  Spondylolisthesis, lumbar region [M43.16]   Referring Physician: Abdoul Zarate PA-C  PCP: Abhishek Sam MD       Plan of care signed (Y/N): YES    Date of Patient follow up with Physician:      Progress Report/POC: NO  Progress note due: 4/3/2024   POC update due: 24                                            Precautions/ Contra-indications:           Latex allergy:  NO  Pacemaker:    NO  Contraindications for Manipulation: NA  Date of Surgery: NA  Other:    Red Flags:  None    C-SSRS Triggered by Intake questionnaire:   [x] No, Questionnaire did not trigger screening.   [] Yes, Patient intake triggered further evaluation      [] C-SSRS Screening completed  [] PCP notified via Plan of Care  [] Emergency services notified     Preferred Language for Healthcare:   [x] English       [] other:    SUBJECTIVE EXAMINATION     Patient stated complaint: Patient reports that her back has been giving her some problems. Patient reports that her pain has been

## 2024-03-25 ENCOUNTER — HOSPITAL ENCOUNTER (OUTPATIENT)
Dept: PHYSICAL THERAPY | Age: 89
Setting detail: THERAPIES SERIES
Discharge: HOME | End: 2024-03-25
Payer: MEDICARE

## 2024-03-25 PROCEDURE — 97112 NEUROMUSCULAR REEDUCATION: CPT

## 2024-03-25 PROCEDURE — 97110 THERAPEUTIC EXERCISES: CPT

## 2024-03-25 NOTE — FLOWSHEET NOTE
1-2x/week for  6-8  weeks for the following treatment interventions:    Interventions:  [x] Therapeutic exercise including: strength training, ROM, including postural re-education.   [x] NMR activation and proprioception, including postural re-education.    [x] Manual therapy as indicated to include: PROM, Gr I-IV mobilizations, and STM  [x] Modalities as needed that may include: Cryotherapy  [x] Patient education on joint protection, postural re-education, activity modification, progression of HEP.        [] Aquatic Therapy    Plan: Cont POC- Continue emphasis/focus on exercise progression and improving soft tissue extensibility. Next visit plan to progress weights and add new exercises     Electronically Signed by Stacey Somers PT, DPT   Date: 03/25/2024    Note: Portions of this note have been templated and/or copied from initial evaluation, reassessments and prior notes for documentation efficiency.

## 2024-03-27 ENCOUNTER — OFFICE VISIT (OUTPATIENT)
Dept: BREAST CENTER | Age: 89
End: 2024-03-27
Payer: MEDICARE

## 2024-03-27 ENCOUNTER — HOSPITAL ENCOUNTER (OUTPATIENT)
Dept: WOMENS IMAGING | Age: 89
Discharge: HOME | End: 2024-03-27
Payer: MEDICARE

## 2024-03-27 VITALS
HEIGHT: 65 IN | SYSTOLIC BLOOD PRESSURE: 146 MMHG | RESPIRATION RATE: 17 BRPM | OXYGEN SATURATION: 98 % | DIASTOLIC BLOOD PRESSURE: 86 MMHG | BODY MASS INDEX: 22.59 KG/M2 | WEIGHT: 135.6 LBS | HEART RATE: 66 BPM

## 2024-03-27 DIAGNOSIS — Z12.31 VISIT FOR SCREENING MAMMOGRAM: ICD-10-CM

## 2024-03-27 DIAGNOSIS — Z17.0 MALIGNANT NEOPLASM OF UPPER-OUTER QUADRANT OF RIGHT BREAST IN FEMALE, ESTROGEN RECEPTOR POSITIVE (HCC): Primary | ICD-10-CM

## 2024-03-27 DIAGNOSIS — Z90.11 S/P PARTIAL MASTECTOMY, RIGHT: ICD-10-CM

## 2024-03-27 DIAGNOSIS — C50.411 MALIGNANT NEOPLASM OF UPPER-OUTER QUADRANT OF RIGHT BREAST IN FEMALE, ESTROGEN RECEPTOR POSITIVE (HCC): Primary | ICD-10-CM

## 2024-03-27 PROCEDURE — 99213 OFFICE O/P EST LOW 20 MIN: CPT | Performed by: SURGERY

## 2024-03-27 PROCEDURE — G8420 CALC BMI NORM PARAMETERS: HCPCS | Performed by: SURGERY

## 2024-03-27 PROCEDURE — G8484 FLU IMMUNIZE NO ADMIN: HCPCS | Performed by: SURGERY

## 2024-03-27 PROCEDURE — G8427 DOCREV CUR MEDS BY ELIG CLIN: HCPCS | Performed by: SURGERY

## 2024-03-27 PROCEDURE — 1090F PRES/ABSN URINE INCON ASSESS: CPT | Performed by: SURGERY

## 2024-03-27 PROCEDURE — 1036F TOBACCO NON-USER: CPT | Performed by: SURGERY

## 2024-03-27 PROCEDURE — 77063 BREAST TOMOSYNTHESIS BI: CPT

## 2024-03-27 PROCEDURE — 1123F ACP DISCUSS/DSCN MKR DOCD: CPT | Performed by: SURGERY

## 2024-03-27 NOTE — PROGRESS NOTES
3/27/2024    Chief Complaint   Patient presents with    1 Year Follow Up     1 year follow up- no concerns        HPI Patient is s/p right lumpectomy for T1cNx ductal carcinoma, ER/AZ positive, Her2 negative 4/2022, with 2 re-excisions. Postop radiation, finished 7/2022. On AI, tolerating well. She has not felt any breast masses, no nipple discharge. She does have some mild right breast swelling and occasional twinges. Here with her daughter.     Bilateral screening mammogram today BIRADS 1C.          Current Outpatient Medications:     propranolol (INDERAL LA) 80 MG extended release capsule, Take 1 capsule by mouth daily, Disp: 90 capsule, Rfl: 1    metoprolol tartrate (LOPRESSOR) 25 MG tablet, Take 1 tablet by mouth 2 times daily, Disp: 28 tablet, Rfl: 0    atorvastatin (LIPITOR) 10 MG tablet, Take 1 tablet by mouth once daily, Disp: 90 tablet, Rfl: 0    valsartan (DIOVAN) 160 MG tablet, Take 1 tablet by mouth once daily, Disp: 90 tablet, Rfl: 0    sucralfate (CARAFATE) 1 GM tablet, TAKE 1/2 (ONE-HALF) TABLET BY MOUTH BEFORE MEAL(S) AND AT NIGHT, Disp: 180 tablet, Rfl: 0    dicyclomine (BENTYL) 10 MG capsule, Take 1 capsule by mouth 4 times daily, Disp: 120 capsule, Rfl: 5    albuterol sulfate HFA (VENTOLIN HFA) 108 (90 Base) MCG/ACT inhaler, Inhale 2 puffs into the lungs 4 times daily as needed for Wheezing or Shortness of Breath, Disp: 18 g, Rfl: 0    Coenzyme Q10 10 MG CAPS, Coenzyme Q10 Oral        active, Disp: , Rfl:     vitamin C (ASCORBIC ACID) 500 MG tablet, Take 1 tablet by mouth daily, Disp: , Rfl:     Multiple Vitamins-Minerals (MULTIVITAMIN ADULT) CHEW, Take by mouth, Disp: , Rfl:     Vitamin D (CHOLECALCIFEROL) 25 MCG (1000 UT) TABS tablet, Take by mouth daily , Disp: , Rfl:     lansoprazole (PREVACID) 30 MG delayed release capsule, Take 1 capsule by mouth daily, Disp: , Rfl:     aspirin 81 MG tablet, Take 1 tablet by mouth daily, Disp: , Rfl:     Misc Intestinal Radha Regulat (ALIGN) CAPS, Take

## 2024-03-27 NOTE — PATIENT INSTRUCTIONS
Mammogram reviewed, no concerning findings  Breast exam performed, no palpable masses.    Continue self breast exams    Healthy Lifestyle Recommendations: healthy diet (decrease consumption of red meat, increase fresh fruits and vegetables), decreased alcohol consumption (less than 4 drinks/week), adequate sleep (goal 6-8 hours), routine exercise (goal 150 minutes/week or greater), weight control.     Return: 1yr with bilateral screening mammogram and breast check

## 2024-04-01 ENCOUNTER — HOSPITAL ENCOUNTER (OUTPATIENT)
Dept: PHYSICAL THERAPY | Age: 89
Setting detail: THERAPIES SERIES
Discharge: HOME | End: 2024-04-01
Payer: MEDICARE

## 2024-04-01 PROCEDURE — 97110 THERAPEUTIC EXERCISES: CPT

## 2024-04-01 PROCEDURE — 97112 NEUROMUSCULAR REEDUCATION: CPT

## 2024-04-01 NOTE — PLAN OF CARE
[facility]- Outpatient Rehabilitation and Therapy [address], [place] [address] office: [phone] fax: [phone]      Physical Therapy Discharge Summary    Dear Abdoul Zarate PA-C  ,    We had the pleasure of treating the following patient for physical therapy services at [facility].  A summary of our findings can be found in the discharge summary below.  If you have any questions or concerns regarding these findings, please do not hesitate to contact me at the office phone number checked above.  Thank you for the referral.     Physician Signature:________________________________Date:__________________  By signing above (or electronic signature), therapist’s plan is approved by physician      Functional Outcome:     Modified Oswestry:   raw score: 9/50    % disability: 18%       Overall Response to Treatment:   [x]Patient is responding well to treatment and improvement is noted with regards  to goals   [x]Patient should continue to improve in reasonable time if they continue HEP   []Patient has plateaued and is no longer responding to skilled PT intervention    []Patient is getting worse and would benefit from return to referring MD   []Patient unable to adhere to initial POC   [x]Other: Patient requests making today her last visit pending upcoming epidural and further medical intervention    Total Visits: 7     Recommendation:    [x] Discharge to [facility]. Follow up with PT or MD PRN.     Today's Assessment: Patient has met 1/2 short term goals and 2/5 long term goals. Patient is progressing in all other goal areas. Patient demonstrates improved strength on manual muscle test, although deficits are still noted. Patient demonstrates improving tolerance to ADLs and functional activities, although prolonged functional activity (such as cooking a holiday dinner) continues to increase back symptoms. Patient demonstrates minimal improvement with score on

## 2024-04-04 DIAGNOSIS — I10 ESSENTIAL HYPERTENSION: ICD-10-CM

## 2024-04-05 RX ORDER — VALSARTAN 160 MG/1
160 TABLET ORAL DAILY
Qty: 90 TABLET | Refills: 0 | Status: SHIPPED | OUTPATIENT
Start: 2024-04-05

## 2024-04-09 ENCOUNTER — NURSE ONLY (OUTPATIENT)
Dept: FAMILY MEDICINE CLINIC | Age: 89
End: 2024-04-09

## 2024-04-09 ENCOUNTER — TELEPHONE (OUTPATIENT)
Dept: FAMILY MEDICINE CLINIC | Age: 89
End: 2024-04-09

## 2024-04-09 VITALS — SYSTOLIC BLOOD PRESSURE: 150 MMHG | DIASTOLIC BLOOD PRESSURE: 74 MMHG

## 2024-04-09 NOTE — TELEPHONE ENCOUNTER
DOP called in stating that her Mom had an injection done today and her blood pressure was 200/84 & 186/85    She did just start taking PROPRANOLOL     DOP was just concerned about her blood pressure being high    DOP said a message can be sent through my chart     Please Advise

## 2024-04-09 NOTE — TELEPHONE ENCOUNTER
Called dop, they are going to come in and do a lab visit bp check.  Please send to dr larsen and let him figure out what he would like to do.

## 2024-05-21 ENCOUNTER — HOSPITAL ENCOUNTER (OUTPATIENT)
Dept: WOMENS IMAGING | Age: 89
Discharge: HOME | End: 2024-05-21
Attending: INTERNAL MEDICINE
Payer: MEDICARE

## 2024-05-21 DIAGNOSIS — M81.0 POST-MENOPAUSAL OSTEOPOROSIS: ICD-10-CM

## 2024-05-21 PROCEDURE — 77080 DXA BONE DENSITY AXIAL: CPT

## 2024-05-31 DIAGNOSIS — E78.2 MIXED HYPERLIPIDEMIA: ICD-10-CM

## 2024-05-31 RX ORDER — ATORVASTATIN CALCIUM 10 MG/1
TABLET, FILM COATED ORAL
Qty: 90 TABLET | Refills: 1 | Status: SHIPPED | OUTPATIENT
Start: 2024-05-31

## 2024-08-30 DIAGNOSIS — G25.0 ESSENTIAL TREMOR: ICD-10-CM

## 2024-08-30 RX ORDER — PROPRANOLOL HYDROCHLORIDE 80 MG/1
80 CAPSULE, EXTENDED RELEASE ORAL DAILY
Qty: 90 CAPSULE | Refills: 0 | Status: SHIPPED | OUTPATIENT
Start: 2024-08-30

## 2024-10-05 DIAGNOSIS — I10 ESSENTIAL HYPERTENSION: ICD-10-CM

## 2024-10-07 RX ORDER — VALSARTAN 160 MG/1
160 TABLET ORAL DAILY
Qty: 90 TABLET | Refills: 0 | Status: SHIPPED | OUTPATIENT
Start: 2024-10-07

## 2024-10-07 NOTE — TELEPHONE ENCOUNTER
Patient is overdue for annual wellness visit and follow-up on chronic conditions with Dr. Sam.  She has not had her blood work completed in over a year.  Needs to have this scheduled before medication is refilled.

## 2024-10-21 ENCOUNTER — OFFICE VISIT (OUTPATIENT)
Dept: FAMILY MEDICINE CLINIC | Age: 89
End: 2024-10-21

## 2024-10-21 ENCOUNTER — HOSPITAL ENCOUNTER (OUTPATIENT)
Dept: CT IMAGING | Age: 89
Discharge: HOME | End: 2024-10-21
Attending: FAMILY MEDICINE
Payer: MEDICARE

## 2024-10-21 VITALS
OXYGEN SATURATION: 95 % | WEIGHT: 130 LBS | BODY MASS INDEX: 21.66 KG/M2 | HEIGHT: 65 IN | HEART RATE: 59 BPM | SYSTOLIC BLOOD PRESSURE: 156 MMHG | RESPIRATION RATE: 16 BRPM | DIASTOLIC BLOOD PRESSURE: 82 MMHG

## 2024-10-21 DIAGNOSIS — R13.10 DYSPHAGIA, UNSPECIFIED TYPE: ICD-10-CM

## 2024-10-21 DIAGNOSIS — R10.32 LLQ ABDOMINAL PAIN: Primary | ICD-10-CM

## 2024-10-21 DIAGNOSIS — R19.7 DIARRHEA, UNSPECIFIED TYPE: ICD-10-CM

## 2024-10-21 DIAGNOSIS — R10.9 ABDOMINAL CRAMPING: ICD-10-CM

## 2024-10-21 DIAGNOSIS — R10.32 LLQ ABDOMINAL PAIN: ICD-10-CM

## 2024-10-21 PROCEDURE — 74176 CT ABD & PELVIS W/O CONTRAST: CPT

## 2024-10-21 RX ORDER — CIPROFLOXACIN 500 MG/1
500 TABLET, FILM COATED ORAL 2 TIMES DAILY
Qty: 14 TABLET | Refills: 0 | Status: SHIPPED | OUTPATIENT
Start: 2024-10-21 | End: 2024-10-28

## 2024-10-21 RX ORDER — METRONIDAZOLE 500 MG/1
500 TABLET ORAL 3 TIMES DAILY
Qty: 21 TABLET | Refills: 0 | Status: SHIPPED | OUTPATIENT
Start: 2024-10-21 | End: 2024-10-28

## 2024-10-21 NOTE — PROGRESS NOTES
10/21/2024    This is a 89 y.o. female   Chief Complaint   Patient presents with    Diarrhea     Pt is living on immodium she has been having loose bowels since last Friday.  She has had a couple accidents dop states it smells like death      HPI    Here for concerns for bowel changes    This is somewhat chronic but worsened about 10 days ago with diarrhea. Typically has flare ups but not this long-lasting    Trying BRAT diet but food and liquids go right through her. Hx of IBS in the past.   - has Bentyl, sucralfate, and Prevacid on hand which aren't particularly helpful this time around.  - she is taking Immodium to try to help  - she does report pain and cramping. She reports this is diffuse, not in one particular area. Worse after food or drink.   - no blood in the stool  - she was going about 3-4x per day.     Review of Systems     Current Outpatient Medications   Medication Sig Dispense Refill    ciprofloxacin (CIPRO) 500 MG tablet Take 1 tablet by mouth 2 times daily for 7 days 14 tablet 0    metroNIDAZOLE (FLAGYL) 500 MG tablet Take 1 tablet by mouth in the morning, at noon, and at bedtime for 7 days 21 tablet 0    valsartan (DIOVAN) 160 MG tablet Take 1 tablet by mouth once daily 90 tablet 0    propranolol (INDERAL LA) 80 MG extended release capsule Take 1 capsule by mouth once daily 90 capsule 0    atorvastatin (LIPITOR) 10 MG tablet Take 1 tablet by mouth once daily 90 tablet 1    sucralfate (CARAFATE) 1 GM tablet TAKE 1/2 (ONE-HALF) TABLET BY MOUTH BEFORE MEAL(S) AND AT NIGHT 180 tablet 0    dicyclomine (BENTYL) 10 MG capsule Take 1 capsule by mouth 4 times daily 120 capsule 5    albuterol sulfate HFA (VENTOLIN HFA) 108 (90 Base) MCG/ACT inhaler Inhale 2 puffs into the lungs 4 times daily as needed for Wheezing or Shortness of Breath 18 g 0    Coenzyme Q10 10 MG CAPS Coenzyme Q10 Oral        active      vitamin C (ASCORBIC ACID) 500 MG tablet Take 1 tablet by mouth daily      Multiple Vitamins-Minerals

## 2024-10-22 ENCOUNTER — TELEPHONE (OUTPATIENT)
Dept: FAMILY MEDICINE CLINIC | Age: 89
End: 2024-10-22

## 2024-10-22 NOTE — TELEPHONE ENCOUNTER
I called and was unable to connect with Maggie (not sure if bad connection) but was unable to leave .    Her scan did not show infection. Please ask how her symptoms are today and let me know.     It separately showed some kidney cysts we will need to follow with imaging later on, but this is incidental and not the cause of her symptoms.

## 2024-10-22 NOTE — TELEPHONE ENCOUNTER
ENEDELIA  Pt was seen at GI today and they believe her symptoms are from constipation   Pt is not feeling any better but is currently doing follow up test with GI

## 2024-10-23 ENCOUNTER — TELEPHONE (OUTPATIENT)
Dept: FAMILY MEDICINE CLINIC | Age: 89
End: 2024-10-23

## 2024-10-23 NOTE — TELEPHONE ENCOUNTER
Called and spoke with pt    She has since seen GI and is going through a tx regimen with them. No new red flags.    Discussed CT without signs of infection. Cysts for which we will get an ultrasound    Advised to call back if not improving over the next 48 hours

## 2024-10-25 ENCOUNTER — PATIENT MESSAGE (OUTPATIENT)
Dept: FAMILY MEDICINE CLINIC | Age: 89
End: 2024-10-25

## 2024-10-25 DIAGNOSIS — R19.7 DIARRHEA OF PRESUMED INFECTIOUS ORIGIN: Primary | ICD-10-CM

## 2024-10-28 DIAGNOSIS — R19.7 DIARRHEA OF PRESUMED INFECTIOUS ORIGIN: ICD-10-CM

## 2024-10-28 LAB
ANION GAP SERPL CALCULATED.3IONS-SCNC: 9 MMOL/L (ref 3–16)
BASOPHILS # BLD: 0 K/UL (ref 0–0.2)
BASOPHILS NFR BLD: 1.1 %
BUN SERPL-MCNC: 19 MG/DL (ref 7–20)
CALCIUM SERPL-MCNC: 9.4 MG/DL (ref 8.3–10.6)
CHLORIDE SERPL-SCNC: 104 MMOL/L (ref 99–110)
CO2 SERPL-SCNC: 29 MMOL/L (ref 21–32)
CREAT SERPL-MCNC: 0.9 MG/DL (ref 0.6–1.2)
DEPRECATED RDW RBC AUTO: 12.9 % (ref 12.4–15.4)
EOSINOPHIL # BLD: 0.1 K/UL (ref 0–0.6)
EOSINOPHIL NFR BLD: 1.4 %
GFR SERPLBLD CREATININE-BSD FMLA CKD-EPI: 61 ML/MIN/{1.73_M2}
GLUCOSE SERPL-MCNC: 105 MG/DL (ref 70–99)
HCT VFR BLD AUTO: 38.9 % (ref 36–48)
HGB BLD-MCNC: 12.8 G/DL (ref 12–16)
LYMPHOCYTES # BLD: 1.1 K/UL (ref 1–5.1)
LYMPHOCYTES NFR BLD: 24.3 %
MCH RBC QN AUTO: 31.5 PG (ref 26–34)
MCHC RBC AUTO-ENTMCNC: 32.9 G/DL (ref 31–36)
MCV RBC AUTO: 95.6 FL (ref 80–100)
MONOCYTES # BLD: 0.5 K/UL (ref 0–1.3)
MONOCYTES NFR BLD: 11.6 %
NEUTROPHILS # BLD: 2.8 K/UL (ref 1.7–7.7)
NEUTROPHILS NFR BLD: 61.6 %
PLATELET # BLD AUTO: 219 K/UL (ref 135–450)
PMV BLD AUTO: 9.8 FL (ref 5–10.5)
POTASSIUM SERPL-SCNC: 4.2 MMOL/L (ref 3.5–5.1)
RBC # BLD AUTO: 4.07 M/UL (ref 4–5.2)
SODIUM SERPL-SCNC: 142 MMOL/L (ref 136–145)
WBC # BLD AUTO: 4.5 K/UL (ref 4–11)

## 2024-10-28 NOTE — TELEPHONE ENCOUNTER
DOP called wanting to see if Dr. Sam can call the pt and see how she is doing.   DOP stated she is losing weight and not doing well.  Still has stomach pain and diarrhea.    Please advise     Call [phone]

## 2024-10-29 RX ORDER — CIPROFLOXACIN 500 MG/1
500 TABLET, FILM COATED ORAL 2 TIMES DAILY
Qty: 14 TABLET | Refills: 0 | Status: SHIPPED | OUTPATIENT
Start: 2024-10-29 | End: 2024-11-05

## 2024-10-29 RX ORDER — METRONIDAZOLE 500 MG/1
500 TABLET ORAL 3 TIMES DAILY
Qty: 21 TABLET | Refills: 0 | Status: SHIPPED | OUTPATIENT
Start: 2024-10-29 | End: 2024-11-05

## 2024-10-29 NOTE — PROGRESS NOTES
Called Maggie  Having LLQ tenderness and diarrhea still  To her it feels like prior bout of diverticulitis  Although she had a negative scan, contrast was held due to renal function.  Discussed pros/cons of treating. She opts to treat. Discussed monitoring for side effects or worsening symptoms

## 2024-11-06 ENCOUNTER — HOSPITAL ENCOUNTER (OUTPATIENT)
Age: 89
Setting detail: OUTPATIENT SURGERY
Discharge: HOME | End: 2024-11-06
Attending: INTERNAL MEDICINE | Admitting: INTERNAL MEDICINE
Payer: MEDICARE

## 2024-11-06 ENCOUNTER — ANESTHESIA (OUTPATIENT)
Dept: ENDOSCOPY | Age: 89
End: 2024-11-06
Payer: MEDICARE

## 2024-11-06 ENCOUNTER — ANESTHESIA EVENT (OUTPATIENT)
Dept: ENDOSCOPY | Age: 89
End: 2024-11-06
Payer: MEDICARE

## 2024-11-06 ENCOUNTER — APPOINTMENT (OUTPATIENT)
Dept: ENDOSCOPY | Age: 89
End: 2024-11-06
Attending: INTERNAL MEDICINE
Payer: MEDICARE

## 2024-11-06 VITALS
OXYGEN SATURATION: 96 % | TEMPERATURE: 97 F | WEIGHT: 130 LBS | BODY MASS INDEX: 21.66 KG/M2 | RESPIRATION RATE: 16 BRPM | HEIGHT: 65 IN | SYSTOLIC BLOOD PRESSURE: 108 MMHG | DIASTOLIC BLOOD PRESSURE: 51 MMHG | HEART RATE: 72 BPM

## 2024-11-06 DIAGNOSIS — R13.10 DYSPHAGIA, UNSPECIFIED TYPE: ICD-10-CM

## 2024-11-06 PROCEDURE — 2709999900 HC NON-CHARGEABLE SUPPLY: Performed by: INTERNAL MEDICINE

## 2024-11-06 PROCEDURE — 3700000000 HC ANESTHESIA ATTENDED CARE: Performed by: INTERNAL MEDICINE

## 2024-11-06 PROCEDURE — 2500000003 HC RX 250 WO HCPCS

## 2024-11-06 PROCEDURE — 7100000010 HC PHASE II RECOVERY - FIRST 15 MIN: Performed by: INTERNAL MEDICINE

## 2024-11-06 PROCEDURE — 6360000002 HC RX W HCPCS

## 2024-11-06 PROCEDURE — 7100000011 HC PHASE II RECOVERY - ADDTL 15 MIN: Performed by: INTERNAL MEDICINE

## 2024-11-06 PROCEDURE — 3609012400 HC EGD TRANSORAL BIOPSY SINGLE/MULTIPLE: Performed by: INTERNAL MEDICINE

## 2024-11-06 PROCEDURE — 88305 TISSUE EXAM BY PATHOLOGIST: CPT

## 2024-11-06 RX ORDER — PROPOFOL 10 MG/ML
INJECTION, EMULSION INTRAVENOUS
Status: DISCONTINUED | OUTPATIENT
Start: 2024-11-06 | End: 2024-11-06 | Stop reason: SDUPTHER

## 2024-11-06 RX ORDER — SODIUM CHLORIDE 0.9 % (FLUSH) 0.9 %
5-40 SYRINGE (ML) INJECTION PRN
Status: CANCELLED | OUTPATIENT
Start: 2024-11-06

## 2024-11-06 RX ORDER — LIDOCAINE HYDROCHLORIDE 20 MG/ML
INJECTION, SOLUTION EPIDURAL; INFILTRATION; INTRACAUDAL; PERINEURAL
Status: DISCONTINUED | OUTPATIENT
Start: 2024-11-06 | End: 2024-11-06 | Stop reason: SDUPTHER

## 2024-11-06 RX ORDER — SODIUM CHLORIDE 0.9 % (FLUSH) 0.9 %
5-40 SYRINGE (ML) INJECTION EVERY 12 HOURS SCHEDULED
Status: CANCELLED | OUTPATIENT
Start: 2024-11-06

## 2024-11-06 RX ORDER — SODIUM CHLORIDE 9 MG/ML
INJECTION, SOLUTION INTRAVENOUS PRN
Status: CANCELLED | OUTPATIENT
Start: 2024-11-06

## 2024-11-06 RX ADMIN — PROPOFOL 50 MG: 10 INJECTION, EMULSION INTRAVENOUS at 12:16

## 2024-11-06 RX ADMIN — LIDOCAINE HYDROCHLORIDE 60 MG: 20 INJECTION, SOLUTION EPIDURAL; INFILTRATION; INTRACAUDAL; PERINEURAL at 12:16

## 2024-11-06 RX ADMIN — PROPOFOL 10 MG: 10 INJECTION, EMULSION INTRAVENOUS at 12:19

## 2024-11-06 ASSESSMENT — PAIN - FUNCTIONAL ASSESSMENT
PAIN_FUNCTIONAL_ASSESSMENT: 0-10

## 2024-11-06 NOTE — ANESTHESIA PRE PROCEDURE
[facility] Department of Anesthesiology  Pre-Anesthesia Evaluation/Consultation       Name:  Maggie Cox  : 1935  Age:  89 y.o.                                           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, monitor    Greater trochanteric bursitis of left hip    Left hip pain    Spinal stenosis of lumbar region     Past Medical History:   Diagnosis Date    Arthritis     Cancer (HCC) 2021    breast ca    GERD (gastroesophageal reflux disease)     High blood pressure     History of factor VIII deficiency     History of IBS     Hx of blood clots     PE    Hyperlipidemia     PONV (postoperative nausea and vomiting)     severe    UTI (urinary tract infection) 14    Wears glasses     reading     Past Surgical History:   Procedure Laterality Date    BREAST SURGERY Right 2022    RIGHT BREAST LUMPECTOMY performed by Peggy Pfeiffer MD at [facility] OR    BREAST SURGERY Right 2022    REEXCISION OF RIGHT BREAST

## 2024-11-06 NOTE — OP NOTE
Endoscopy Note    Patient: Maggie Cox  : 1935  Acct#:     Procedure: Esophagogastroduodenoscopy with biopsy, esophageal dilation                         Date:  2024     Surgeon:   Apple Adkins MD    Referring Physician:  Abhishek Sam MD    Indications: This is a 89 y.o. year old female who presents today with dysphagia.       Postoperative Diagnosis:    Normal esophagus in exam for dysphagia. Biopsies obtained from the proximal and distal esophagus to evaluate for eosinophilic esophagitis.    The esophagus was empirically dilated with 54 Fr. Samuels dilator. There is no dilation effect.      Otherwise normal EGD   Biopsies obtained from the antrum, angularis and body to evaluate for H. pylori.      Anesthesia:  Administered by the anesthesia service during MAC      Consent:  The patient or their legal guardian has signed an informed consent, and is aware of the potential risks, benefits, alternatives, and potential complications of this procedure.  These include, but are not limited to hemorrhage, bleeding, post procedural pain, perforation, phlebitis, aspiration, hypotension, hypoxia, cardiovascular events such as arryhthmia, and possibly death.     Description of Procedure: The patient was then taken to the endoscopy suite, placed in the left lateral decubitus position and the above IV sedation was administrered.    The Olympus video endoscope was placed through the patient's oropharynx without difficulty to the extent of the 2nd portion of the duodenum.  Both forward and retroflexed views of the stomach were obtained.      Findings:    Esophagus: Normal esophagus in exam for dysphagia. Biopsies obtained from the proximal and distal esophagus to evaluate for eosinophilic esophagitis.    The esophagus was empirically dilated with 54 Fr. Samuels dilator. There is no dilation effect.   The Z line was located at 38 cm, the GE junction at 38 cm, and the hiatus at 38 cm.     Stomach:

## 2024-11-06 NOTE — ANESTHESIA POSTPROCEDURE EVALUATION
Department of Anesthesiology  Postprocedure Note    Patient: Maggie Cox  MRN: [id number]  YOB: 1935  Date of evaluation: 11/6/2024    Procedure Summary       Date: 11/06/24 Room / Location: [place] [address] / [facility]    Anesthesia Start: 1212 Anesthesia Stop:     Procedure: ESOPHAGOGASTRODUODENOSCOPY BIOPSY Diagnosis:       Dysphagia, unspecified type      (Dysphagia, unspecified type [R13.10])    Surgeons: Apple Adkins MD Responsible Provider: Kai Morgan MD    Anesthesia Type: MAC ASA Status: 3            Anesthesia Type: No value filed.    Piyush Phase I: Piyush Score: 10    Piyush Phase II:      Anesthesia Post Evaluation    Patient location during evaluation: PACU  Patient participation: complete - patient participated  Level of consciousness: awake and alert  Pain score: 0  Airway patency: patent  Nausea & Vomiting: no nausea and no vomiting  Cardiovascular status: blood pressure returned to baseline  Respiratory status: acceptable  Hydration status: euvolemic  Pain management: adequate    No notable events documented.

## 2024-11-06 NOTE — H&P
Pre-operative History and Physical    Patient: Maggie Cox  : 1935  Acct#:     Intended Procedure:  EGD    HISTORY OF PRESENT ILLNESS:  The patient is a 89 y.o. female  who presents for/due to dysphagia.       Past Medical History:        Diagnosis Date    Arthritis     Cancer (HCC) 2021    breast ca    GERD (gastroesophageal reflux disease)     High blood pressure     History of factor VIII deficiency     History of IBS     Hx of blood clots     PE    Hyperlipidemia     PONV (postoperative nausea and vomiting)     severe    UTI (urinary tract infection) 14    Wears glasses     reading     Past Surgical History:        Procedure Laterality Date    BREAST SURGERY Right 2022    RIGHT BREAST LUMPECTOMY performed by Peggy Pfeiffer MD at [facility] OR    BREAST SURGERY Right 2022    REEXCISION OF RIGHT BREAST LUMPECTOMY CAVITY performed by Peggy Pfeiffer MD at [facility] OR    BREAST SURGERY Right 2022    RE-EXCISION OF RIGHT BREAST LUMPECTOMY CAVITY performed by Peggy Pfeiffer MD at [facility] OR    CARPAL TUNNEL RELEASE Right     HYSTERECTOMY (CERVIX STATUS UNKNOWN)      partial    NECK SURGERY      discectomy    ROTATOR CUFF REPAIR Left     SHOULDER ARTHROPLASTY Left 2014    TOTAL KNEE ARTHROPLASTY Left 2014    left knee     TOTAL KNEE ARTHROPLASTY Right 2012    TUMOR REMOVAL      tumor on thyroid ovary and tube    US BREAST BIOPSY W LOC DEVICE 1ST LESION RIGHT Right 3/28/2022    US BREAST NEEDLE BIOPSY RIGHT 3/28/2022 [facility] ULTRASOUND    VARICOSE VEIN SURGERY Bilateral      Medications Prior to Admission:   No current facility-administered medications on file prior to encounter.     Current Outpatient Medications on File Prior to Encounter   Medication Sig Dispense Refill    valsartan (DIOVAN) 160 MG tablet Take 1 tablet by mouth once daily 90 tablet 0    propranolol (INDERAL LA) 80 MG extended release capsule Take 1 capsule by mouth once daily 90 capsule 0

## 2024-11-06 NOTE — DISCHARGE INSTRUCTIONS
Your EGD was normal. We did take biopsies for microscopic conditions. There is no narrowing in the esophagus.     Clear liquid diet. Advance diet as tolerated  If swallowing trouble persist would consider evaluation by speech therapy and/or esophageal manometry   The patient had biopsies taken today.  The patient should check patient portal for results in 7 days and call us if they have not heard from our office within 14 days.  Our number is [phone].        Endoscopy Discharge Instructions      You may be drowsy or lightheaded after receiving sedation.  DO NOT operate  a vehicle (automobile, bicycle, motorcycle, machinery, or power tools), no  alcoholic beverages, and do not make any important decisions today.                 Plan on bed rest or quiet relaxation today.  Resume normal activities in the morning.     Resume normal activity tomorrow unless otherwise advised by your physician.            Eat a light first meal, avoiding spicy and fatty foods, then resume normal diet unless  you are told otherwise by your physician.    If the intravenous medication site is painful, apply warm compresses on the site until the soreness is relieved and elevate the arm above the heart. Call your physician if no improvement  in 2-3 days.       POSSIBLE SYMPTOMS TO WATCH:     1. fever (greater than 100) 5. increased abdominal bloating   2. severe pain   6. excessive bleeding   3. nausea and vomiting  7. chest pain   4. chills    8. shortness of breath       Expected as normal and remedies:  Sore throat: use over the counter throat lozenges or gargle with warm salt water.  Redness or soreness at the IV site: apply warm compress  Gaseous discomfort: belching or passing flatus (gas).

## 2024-11-11 DIAGNOSIS — E78.2 MIXED HYPERLIPIDEMIA: ICD-10-CM

## 2024-11-11 RX ORDER — ATORVASTATIN CALCIUM 10 MG/1
TABLET, FILM COATED ORAL
Qty: 90 TABLET | Refills: 0 | Status: SHIPPED | OUTPATIENT
Start: 2024-11-11

## 2024-11-11 RX ORDER — DICYCLOMINE HYDROCHLORIDE 10 MG/1
CAPSULE ORAL
Qty: 120 CAPSULE | Refills: 0 | Status: SHIPPED | OUTPATIENT
Start: 2024-11-11

## 2024-11-13 ENCOUNTER — TRANSCRIBE ORDERS (OUTPATIENT)
Dept: GENERAL RADIOLOGY | Age: 89
End: 2024-11-13

## 2024-11-13 ENCOUNTER — HOSPITAL ENCOUNTER (OUTPATIENT)
Age: 89
Discharge: HOME | End: 2024-11-13
Payer: MEDICARE

## 2024-11-13 ENCOUNTER — HOSPITAL ENCOUNTER (OUTPATIENT)
Dept: GENERAL RADIOLOGY | Age: 89
Discharge: HOME | End: 2024-11-13
Attending: INTERNAL MEDICINE
Payer: MEDICARE

## 2024-11-13 PROCEDURE — 74018 RADEX ABDOMEN 1 VIEW: CPT

## 2024-11-26 ENCOUNTER — HOSPITAL ENCOUNTER (OUTPATIENT)
Dept: MRI IMAGING | Age: 89
Discharge: HOME | End: 2024-11-26
Payer: MEDICARE

## 2024-11-26 DIAGNOSIS — M48.061 LUMBAR STENOSIS WITHOUT NEUROGENIC CLAUDICATION: ICD-10-CM

## 2024-11-26 DIAGNOSIS — M43.16 SPONDYLOLISTHESIS OF LUMBAR REGION: ICD-10-CM

## 2024-11-26 PROCEDURE — 72148 MRI LUMBAR SPINE W/O DYE: CPT

## 2024-12-03 DIAGNOSIS — G25.0 ESSENTIAL TREMOR: ICD-10-CM

## 2024-12-03 RX ORDER — PROPRANOLOL HYDROCHLORIDE 80 MG/1
80 CAPSULE, EXTENDED RELEASE ORAL DAILY
Qty: 90 CAPSULE | Refills: 0 | Status: SHIPPED | OUTPATIENT
Start: 2024-12-03

## 2024-12-09 ENCOUNTER — OFFICE VISIT (OUTPATIENT)
Dept: FAMILY MEDICINE CLINIC | Age: 89
End: 2024-12-09

## 2024-12-09 ENCOUNTER — HOSPITAL ENCOUNTER (OUTPATIENT)
Dept: GENERAL RADIOLOGY | Age: 89
Discharge: HOME | End: 2024-12-09
Attending: FAMILY MEDICINE
Payer: MEDICARE

## 2024-12-09 ENCOUNTER — HOSPITAL ENCOUNTER (OUTPATIENT)
Age: 89
Discharge: HOME | End: 2024-12-09
Payer: MEDICARE

## 2024-12-09 VITALS
WEIGHT: 129 LBS | OXYGEN SATURATION: 98 % | HEIGHT: 66 IN | BODY MASS INDEX: 20.73 KG/M2 | RESPIRATION RATE: 14 BRPM | HEART RATE: 75 BPM

## 2024-12-09 DIAGNOSIS — M79.641 RIGHT HAND PAIN: ICD-10-CM

## 2024-12-09 DIAGNOSIS — I65.23 BILATERAL CAROTID ARTERY STENOSIS: ICD-10-CM

## 2024-12-09 DIAGNOSIS — R93.429 ABNORMAL CT SCAN, KIDNEY: ICD-10-CM

## 2024-12-09 DIAGNOSIS — Z00.00 MEDICARE ANNUAL WELLNESS VISIT, SUBSEQUENT: Primary | ICD-10-CM

## 2024-12-09 PROCEDURE — 73130 X-RAY EXAM OF HAND: CPT

## 2024-12-09 RX ORDER — PREGABALIN 25 MG/1
25 CAPSULE ORAL 2 TIMES DAILY
COMMUNITY

## 2024-12-09 ASSESSMENT — PATIENT HEALTH QUESTIONNAIRE - PHQ9
2. FEELING DOWN, DEPRESSED OR HOPELESS: NOT AT ALL
SUM OF ALL RESPONSES TO PHQ QUESTIONS 1-9: 0
SUM OF ALL RESPONSES TO PHQ9 QUESTIONS 1 & 2: 0
1. LITTLE INTEREST OR PLEASURE IN DOING THINGS: NOT AT ALL
SUM OF ALL RESPONSES TO PHQ QUESTIONS 1-9: 0

## 2024-12-09 ASSESSMENT — LIFESTYLE VARIABLES
HOW MANY STANDARD DRINKS CONTAINING ALCOHOL DO YOU HAVE ON A TYPICAL DAY: 1 OR 2
HOW OFTEN DO YOU HAVE A DRINK CONTAINING ALCOHOL: 2-4 TIMES A MONTH

## 2024-12-09 NOTE — PATIENT INSTRUCTIONS
cataracts, macular degeneration, and other eye disorders.  A preventive dental visit is recommended every 6 months.  Try to get at least 150 minutes of exercise per week or 10,000 steps per day on a pedometer .  Order or download the FREE \"Exercise & Physical Activity: Your Everyday Guide\" from [employer]. Call 1-485.116.5082 or search [employer] [employer] online.  You need 2622-4710 mg of calcium and 0858-0283 IU of vitamin D per day. It is possible to meet your calcium requirement with diet alone, but a vitamin D supplement is usually necessary to meet this goal.  When exposed to the sun, use a sunscreen that protects against both UVA and UVB radiation with an SPF of 30 or greater. Reapply every 2 to 3 hours or after sweating, drying off with a towel, or swimming.  Always wear a seat belt when traveling in a car. Always wear a helmet when riding a bicycle or motorcycle.

## 2024-12-09 NOTE — PROGRESS NOTES
Medicare Annual Wellness Visit    Maggie Cox is here for Medicare AWV and Fall (Fell at restaurant on 12/7, face is bruised, pain Wrist and knee on Rt side)    Assessment & Plan   Medicare annual wellness visit, subsequent  Right hand pain  -     XR HAND RIGHT (MIN 3 VIEWS); Future  Abnormal CT scan, kidney  -     US RENAL COMPLETE; Future  Bilateral carotid artery stenosis - mild/moderate, followed by [facility] Dr. Sifuentes  -     Vascular duplex carotid bilateral; Future  -     Izaiah Angel MD, Vascular/Endovascular Surgery, [facility] [place]    Reviewed screenings    See separate note for other dx    Recommendations for Preventive Services Due: see orders and patient instructions/AVS.  Recommended screening schedule for the next 5-10 years is provided to the patient in written form: see Patient Instructions/AVS.     No follow-ups on file.     Subjective       Patient's complete Health Risk Assessment and screening values have been reviewed and are found in Flowsheets. The following problems were reviewed today and where indicated follow up appointments were made and/or referrals ordered.    Positive Risk Factor Screenings with Interventions:    Fall Risk:  Do you feel unsteady or are you worried about falling? : no  2 or more falls in past year?: no  Fall with injury in past year?: (!) yes     Interventions:    Reviewed medications, home hazards, visual acuity, and co-morbidities that can increase risk for falls  Discussed using cane regularly    Cognitive:   Clock Drawing Test (CDT): (!) Abnormal  Words recalled: 2 Words Recalled  Total Score: (!) 2  Total Score Interpretation: Abnormal Mini-Cog  Interventions:  Patient declines any further evaluation or treatment           General HRA Questions:  Select all that apply: (!) New or Increased Pain  Interventions - Pain:  Treating pain from fall, discussed this       Poor Eating Habits/Diet:  Do you eat balanced/healthy meals regularly?: (!) No (weight

## 2024-12-09 NOTE — PROGRESS NOTES
12/9/2024    This is a 89 y.o. female   Chief Complaint   Patient presents with    Medicare AWV    Fall     Fell at restaurant on 12/7, face is bruised, pain Wrist and knee on Rt side     HPI    Two days ago was walking into a restaurant and tripped over a raise part of the cement  - landed on her R side  - since has had R hand and thumb pain  Bruising and selling along left hand, arm, left side, facial abrasion    Review of Systems     Current Outpatient Medications   Medication Sig Dispense Refill    pregabalin (LYRICA) 25 MG capsule Take 1 capsule by mouth 2 times daily.      propranolol (INDERAL LA) 80 MG extended release capsule Take 1 capsule by mouth once daily 90 capsule 0    atorvastatin (LIPITOR) 10 MG tablet Take 1 tablet by mouth once daily 90 tablet 0    dicyclomine (BENTYL) 10 MG capsule Take 1 capsule by mouth 4 times daily 120 capsule 0    valsartan (DIOVAN) 160 MG tablet Take 1 tablet by mouth once daily 90 tablet 0    sucralfate (CARAFATE) 1 GM tablet TAKE 1/2 (ONE-HALF) TABLET BY MOUTH BEFORE MEAL(S) AND AT NIGHT 180 tablet 0    albuterol sulfate HFA (VENTOLIN HFA) 108 (90 Base) MCG/ACT inhaler Inhale 2 puffs into the lungs 4 times daily as needed for Wheezing or Shortness of Breath 18 g 0    Coenzyme Q10 10 MG CAPS Coenzyme Q10 Oral        active      vitamin C (ASCORBIC ACID) 500 MG tablet Take 1 tablet by mouth daily      Multiple Vitamins-Minerals (MULTIVITAMIN ADULT) CHEW Take by mouth      Vitamin D (CHOLECALCIFEROL) 25 MCG (1000 UT) TABS tablet Take by mouth daily       lansoprazole (PREVACID) 30 MG delayed release capsule Take 1 capsule by mouth daily      aspirin 81 MG tablet Take 1 tablet by mouth daily      Misc Intestinal Radha Regulat (ALIGN) CAPS Take  by mouth daily.      CALCIUM ACETATE Take 1 tablet by mouth 2 times daily       GLUCOSAMINE CHONDROITIN COMPLX PO Take  by mouth 2 times daily.       No current facility-administered medications for this visit.       Pulse 75   Resp

## 2024-12-11 ENCOUNTER — TELEPHONE (OUTPATIENT)
Dept: ORTHOPEDIC SURGERY | Age: 89
End: 2024-12-11

## 2024-12-11 DIAGNOSIS — S62.526A: Primary | ICD-10-CM

## 2024-12-11 NOTE — TELEPHONE ENCOUNTER
General Question     Subject: RT THUMB  Patient and /or Facility Request: Maggie Cox   Contact Number:  [phone]     PATIENT CALLING TO GET AN APPOINTMENT SCHEDULED PER HER PCP FOR A FC THUMB.    JR TOOK CALL.  
Spoke with the patient, scheduled her a sooner appointment.  
56 yo F hx of HLD, follows with cardiologist, recent normal echo and stress in June 2023, last saw cardiologist 2 weeks ago, scheduled for CCTA in March here for assessment of CP -- notes she woke up this AM feeling fine, bent forward and became lightheaded, then developed chest pressure with heaviness in bilateral arms. Pain radiates to L shoulder. Notes she feels nauseous, diaphoretic, light headed. No change in sx with exertion, movement.     No hx of PE/DVT. No LE edema, immobilization, surgery.    Patient follows with cardiology due to FH -- mom had CVA x 2, dad had AAA and had MI during repair causing death at age 78. No premature cardiac death.

## 2024-12-12 ENCOUNTER — OFFICE VISIT (OUTPATIENT)
Dept: ORTHOPEDIC SURGERY | Age: 89
End: 2024-12-12

## 2024-12-12 VITALS — HEIGHT: 66 IN | BODY MASS INDEX: 20.73 KG/M2 | WEIGHT: 129 LBS

## 2024-12-12 DIAGNOSIS — S62.524A CLOSED NONDISPLACED FRACTURE OF DISTAL PHALANX OF RIGHT THUMB, INITIAL ENCOUNTER: Primary | ICD-10-CM

## 2024-12-12 SDOH — HEALTH STABILITY: PHYSICAL HEALTH
ON AVERAGE, HOW MANY DAYS PER WEEK DO YOU ENGAGE IN MODERATE TO STRENUOUS EXERCISE (LIKE A BRISK WALK)?: PATIENT DECLINED

## 2024-12-12 NOTE — PROGRESS NOTES
This 89 y.o. right hand dominant retired woman is seen in referral for Abhishek Sam MD  with a chief complaint of injury to their right thumb, which was injured 5 days ago when she fell on her hand.  She noticed pain, swelling, and bruising.  She was seen by her PCP.  Xrays were obtained and the patient was referred for hand/upper extremity evaluation and treatment. There is history of additional small periorbital laceration which was dressed.  Symptoms have improved since the date of injury. The pain assessment has been reviewed and is correct.    The patient's social history, past medical history, family history, medications, allergies and review of systems, entered 12/12/24,  have been reviewed, and dated and are recorded in the chart.    On physical examination the patient is Height: 167.6 cm (5' 6\") tall and weighs Weight - Scale: 58.5 kg (129 lb).  Respirations are 18 per minute.  The patient is well nourished, is oriented to time and place, demonstrates appropriate mood and affect as well as normal gait and station.  There is mild soft tissue swelling present about the right hand, thumb.  There is mild discoloration.  There is no deformity.   Tenderness is present on palpation in the area of the right thumb  Range of motion of the thumb is limited only on the right and is accompanied by mild pain.  Skin is intact, as is distal circulation and sensation.  Gross muscle strength is limited only on the right   Hand and wrist joints are stable.  There are no subcutaneous nodules or enlarged epitrochlear lymph nodes.    I have personally reviewed and interpreted all previous external imaging studies(Xrays, DEXA Scans), laboratory tests(BMP, CBC, Diff), diagnostic procedures and medical encounters pertinent to this patient's visit today.    Xrays: Review and independent interpretation of the recent external Xrays of the right hand demonstrate a comminuted, nondisplaced fracture of the distal phalanx of the thumb.

## 2025-01-03 ENCOUNTER — HOSPITAL ENCOUNTER (OUTPATIENT)
Dept: VASCULAR LAB | Age: 89
Discharge: HOME | End: 2025-01-03
Attending: FAMILY MEDICINE
Payer: MEDICARE

## 2025-01-03 ENCOUNTER — HOSPITAL ENCOUNTER (OUTPATIENT)
Dept: ULTRASOUND IMAGING | Age: 89
Discharge: HOME | End: 2025-01-03
Attending: FAMILY MEDICINE
Payer: MEDICARE

## 2025-01-03 ENCOUNTER — TELEPHONE (OUTPATIENT)
Dept: FAMILY MEDICINE CLINIC | Age: 89
End: 2025-01-03

## 2025-01-03 DIAGNOSIS — I65.23 BILATERAL CAROTID ARTERY STENOSIS: ICD-10-CM

## 2025-01-03 DIAGNOSIS — R93.429 ABNORMAL CT SCAN, KIDNEY: ICD-10-CM

## 2025-01-03 LAB
VAS LEFT CCA DIST EDV: 14.6 CM/S
VAS LEFT CCA DIST PSV: 52.7 CM/S
VAS LEFT CCA MID EDV: 13.4 CM/S
VAS LEFT CCA MID PSV: 56.4 CM/S
VAS LEFT CCA PROX EDV: 10.9 CM/S
VAS LEFT CCA PROX PSV: 73.6 CM/S
VAS LEFT ECA EDV: 0 CM/S
VAS LEFT ECA PSV: 52.7 CM/S
VAS LEFT ICA DIST EDV: 23.2 CM/S
VAS LEFT ICA DIST PSV: 82.1 CM/S
VAS LEFT ICA MID EDV: 17.1 CM/S
VAS LEFT ICA MID PSV: 80.9 CM/S
VAS LEFT ICA PROX EDV: 14.6 CM/S
VAS LEFT ICA PROX PSV: 53.9 CM/S
VAS LEFT ICA/CCA PSV: 1.6 NO UNITS
VAS LEFT VERTEBRAL EDV: 13.4 CM/S
VAS LEFT VERTEBRAL PSV: 49 CM/S
VAS RIGHT CCA DIST EDV: 14.2 CM/S
VAS RIGHT CCA DIST PSV: 46.8 CM/S
VAS RIGHT CCA MID EDV: 14.2 CM/S
VAS RIGHT CCA MID PSV: 54.6 CM/S
VAS RIGHT CCA PROX EDV: 12.9 CM/S
VAS RIGHT CCA PROX PSV: 53.3 CM/S
VAS RIGHT ECA EDV: 0 CM/S
VAS RIGHT ECA PSV: 61.2 CM/S
VAS RIGHT ICA DIST EDV: 25.6 CM/S
VAS RIGHT ICA DIST PSV: 134.4 CM/S
VAS RIGHT ICA MID EDV: 38.8 CM/S
VAS RIGHT ICA MID PSV: 203.5 CM/S
VAS RIGHT ICA PROX EDV: 66.3 CM/S
VAS RIGHT ICA PROX PSV: 282 CM/S
VAS RIGHT ICA/CCA PSV: 6 NO UNITS
VAS RIGHT VERTEBRAL EDV: 19.9 CM/S
VAS RIGHT VERTEBRAL PSV: 69.2 CM/S

## 2025-01-03 PROCEDURE — 93880 EXTRACRANIAL BILAT STUDY: CPT | Performed by: SURGERY

## 2025-01-03 PROCEDURE — 76770 US EXAM ABDO BACK WALL COMP: CPT

## 2025-01-03 PROCEDURE — 93880 EXTRACRANIAL BILAT STUDY: CPT

## 2025-01-03 NOTE — TELEPHONE ENCOUNTER
Please let Maggie know that her carotid ultrasound is stable from prior checks.  We can recheck annually.  Thank you

## 2025-01-07 DIAGNOSIS — E78.2 MIXED HYPERLIPIDEMIA: ICD-10-CM

## 2025-01-07 DIAGNOSIS — I10 ESSENTIAL HYPERTENSION: ICD-10-CM

## 2025-01-07 RX ORDER — VALSARTAN 160 MG/1
160 TABLET ORAL DAILY
Qty: 90 TABLET | Refills: 0 | Status: SHIPPED | OUTPATIENT
Start: 2025-01-07

## 2025-01-07 RX ORDER — SUCRALFATE 1 G/1
TABLET ORAL
Qty: 180 TABLET | Refills: 0 | Status: SHIPPED | OUTPATIENT
Start: 2025-01-07

## 2025-01-07 RX ORDER — ATORVASTATIN CALCIUM 10 MG/1
TABLET, FILM COATED ORAL
Qty: 90 TABLET | Refills: 0 | Status: SHIPPED | OUTPATIENT
Start: 2025-01-07

## 2025-01-09 ENCOUNTER — OFFICE VISIT (OUTPATIENT)
Dept: ORTHOPEDIC SURGERY | Age: 89
End: 2025-01-09

## 2025-01-09 VITALS — HEIGHT: 66 IN | WEIGHT: 129 LBS | RESPIRATION RATE: 14 BRPM | BODY MASS INDEX: 20.73 KG/M2

## 2025-01-09 DIAGNOSIS — S62.524A CLOSED NONDISPLACED FRACTURE OF DISTAL PHALANX OF RIGHT THUMB, INITIAL ENCOUNTER: Primary | ICD-10-CM

## 2025-01-09 PROCEDURE — 99024 POSTOP FOLLOW-UP VISIT: CPT | Performed by: ORTHOPAEDIC SURGERY

## 2025-01-09 NOTE — PROGRESS NOTES
The patient has had no significant problems and voices no complaints. She removed the splint a few days ago.  The patient's social history, past medical history, family history, medications, allergies and review of systems have been reviewed, dated 12/12/24 and are recorded in the chart.     On examination there is less swelling, no deformity and no tenderness at the fracture site.  Range of motion is improved. Skin is intact, as is distal circulation and sensation.  Gross muscle strength is normal bilaterally.  Hand and wrist joints are stable.  There are no subcutaneous nodules or enlarged epitrochlear lymph nodes.     AP and lateral Xrays of the right thumb done in the office today, demonstrate  progressive healing of the fracture in good position.     The Xrays are shown to the patient.     She is instructed about activity precautions, intermittent splint use as needed and a range of motion exercise program, which is fully discussed and demonstrated.     The usual course of events in the resolution of the symptoms associated with this condition is fully discussed with the patient.  As long as they progress as expected, they do not need to return for further follow up.  They are, however, urged to call or return if they have questions or concerns or if full painless function of their thumb has not returned by 10 days from today.

## 2025-02-05 ENCOUNTER — OFFICE VISIT (OUTPATIENT)
Dept: FAMILY MEDICINE CLINIC | Age: 89
End: 2025-02-05

## 2025-02-05 VITALS
WEIGHT: 130 LBS | RESPIRATION RATE: 12 BRPM | BODY MASS INDEX: 20.98 KG/M2 | SYSTOLIC BLOOD PRESSURE: 160 MMHG | DIASTOLIC BLOOD PRESSURE: 80 MMHG | HEART RATE: 74 BPM | OXYGEN SATURATION: 97 %

## 2025-02-05 DIAGNOSIS — G89.29 CHRONIC MIDLINE LOW BACK PAIN WITH RIGHT-SIDED SCIATICA: ICD-10-CM

## 2025-02-05 DIAGNOSIS — M54.41 CHRONIC MIDLINE LOW BACK PAIN WITH RIGHT-SIDED SCIATICA: ICD-10-CM

## 2025-02-05 DIAGNOSIS — M48.061 SPINAL STENOSIS OF LUMBAR REGION, UNSPECIFIED WHETHER NEUROGENIC CLAUDICATION PRESENT: ICD-10-CM

## 2025-02-05 DIAGNOSIS — Z86.711 HISTORY OF PULMONARY EMBOLISM: ICD-10-CM

## 2025-02-05 DIAGNOSIS — Z01.810 PREOP CARDIOVASCULAR EXAM: Primary | ICD-10-CM

## 2025-02-05 PROBLEM — D66 FACTOR VIII DEFICIENCY (HCC): Status: RESOLVED | Noted: 2025-02-05 | Resolved: 2025-02-05

## 2025-02-05 PROBLEM — D66 FACTOR VIII DEFICIENCY (HCC): Status: ACTIVE | Noted: 2025-02-05

## 2025-02-05 LAB
ANION GAP SERPL CALCULATED.3IONS-SCNC: 9 MMOL/L (ref 3–16)
BASOPHILS # BLD: 0.1 K/UL (ref 0–0.2)
BASOPHILS NFR BLD: 1 %
BUN SERPL-MCNC: 25 MG/DL (ref 7–20)
CALCIUM SERPL-MCNC: 9.5 MG/DL (ref 8.3–10.6)
CHLORIDE SERPL-SCNC: 103 MMOL/L (ref 99–110)
CO2 SERPL-SCNC: 28 MMOL/L (ref 21–32)
CREAT SERPL-MCNC: 0.9 MG/DL (ref 0.6–1.2)
DEPRECATED RDW RBC AUTO: 14.9 % (ref 12.4–15.4)
EOSINOPHIL # BLD: 0 K/UL (ref 0–0.6)
EOSINOPHIL NFR BLD: 0 %
GFR SERPLBLD CREATININE-BSD FMLA CKD-EPI: 61 ML/MIN/{1.73_M2}
GLUCOSE SERPL-MCNC: 93 MG/DL (ref 70–99)
HCT VFR BLD AUTO: 39.3 % (ref 36–48)
HGB BLD-MCNC: 13 G/DL (ref 12–16)
INR PPP: 0.91 (ref 0.85–1.15)
LYMPHOCYTES # BLD: 1.6 K/UL (ref 1–5.1)
LYMPHOCYTES NFR BLD: 26 %
MCH RBC QN AUTO: 31.3 PG (ref 26–34)
MCHC RBC AUTO-ENTMCNC: 33.2 G/DL (ref 31–36)
MCV RBC AUTO: 94.4 FL (ref 80–100)
MONOCYTES # BLD: 0.3 K/UL (ref 0–1.3)
MONOCYTES NFR BLD: 5 %
NEUTROPHILS # BLD: 4.1 K/UL (ref 1.7–7.7)
NEUTROPHILS NFR BLD: 68 %
PLATELET # BLD AUTO: 215 K/UL (ref 135–450)
PMV BLD AUTO: 9.7 FL (ref 5–10.5)
POTASSIUM SERPL-SCNC: 4.8 MMOL/L (ref 3.5–5.1)
PROTHROMBIN TIME: 12.5 SEC (ref 11.9–14.9)
RBC # BLD AUTO: 4.16 M/UL (ref 4–5.2)
RBC MORPH BLD: NORMAL
SODIUM SERPL-SCNC: 140 MMOL/L (ref 136–145)
WBC # BLD AUTO: 6 K/UL (ref 4–11)

## 2025-02-05 SDOH — ECONOMIC STABILITY: FOOD INSECURITY: WITHIN THE PAST 12 MONTHS, THE FOOD YOU BOUGHT JUST DIDN'T LAST AND YOU DIDN'T HAVE MONEY TO GET MORE.: NEVER TRUE

## 2025-02-05 SDOH — ECONOMIC STABILITY: FOOD INSECURITY: WITHIN THE PAST 12 MONTHS, YOU WORRIED THAT YOUR FOOD WOULD RUN OUT BEFORE YOU GOT MONEY TO BUY MORE.: NEVER TRUE

## 2025-02-05 ASSESSMENT — PATIENT HEALTH QUESTIONNAIRE - PHQ9
1. LITTLE INTEREST OR PLEASURE IN DOING THINGS: NOT AT ALL
SUM OF ALL RESPONSES TO PHQ QUESTIONS 1-9: 0
2. FEELING DOWN, DEPRESSED OR HOPELESS: NOT AT ALL
SUM OF ALL RESPONSES TO PHQ QUESTIONS 1-9: 0
SUM OF ALL RESPONSES TO PHQ QUESTIONS 1-9: 0
SUM OF ALL RESPONSES TO PHQ9 QUESTIONS 1 & 2: 0
SUM OF ALL RESPONSES TO PHQ QUESTIONS 1-9: 0

## 2025-02-05 NOTE — PROGRESS NOTES
Subjective:     Chief Complaint   Patient presents with    Pre-op Exam     Rt L2-3 laminectomy 02/21 w Dr Moser      Maggie Cox is a 89 y.o.  female who presents to the office today for a preoperative consultation at the request of surgeon Dr. Ayers who plans on performing Rt. 2-3 laminectomy on 2/21/25 at [place].    The problem warranting surgery is disc herniation and has been going on for for 10+ years.    Risk factors include:  HTN, carotid stenosis on right side, DVT/PE after prior neck surgery, phlebitis during pregnancy.    Diabetes: no  Coronary Artery Disease: no  COPD: no  Tobacco use? Smoked cigarettes over 40 years ago, socially.    Planned anesthesia is General.   History of anesthesia problems? Has tolerated general anesthesia well in the past., except for vomiting.  History of bleeding disorder or clotting disorder - no bleeding Hx, but DVT/PE as per above.    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

## 2025-02-08 DIAGNOSIS — E78.2 MIXED HYPERLIPIDEMIA: ICD-10-CM

## 2025-02-10 RX ORDER — ATORVASTATIN CALCIUM 10 MG/1
TABLET, FILM COATED ORAL
Qty: 90 TABLET | Refills: 0 | Status: SHIPPED | OUTPATIENT
Start: 2025-02-10

## 2025-03-12 ENCOUNTER — OFFICE VISIT (OUTPATIENT)
Dept: VASCULAR SURGERY | Age: 89
End: 2025-03-12
Payer: MEDICARE

## 2025-03-12 VITALS
BODY MASS INDEX: 20.89 KG/M2 | HEIGHT: 66 IN | HEART RATE: 77 BPM | OXYGEN SATURATION: 94 % | WEIGHT: 130 LBS | SYSTOLIC BLOOD PRESSURE: 162 MMHG | DIASTOLIC BLOOD PRESSURE: 90 MMHG

## 2025-03-12 DIAGNOSIS — I65.23 BILATERAL CAROTID ARTERY STENOSIS: Primary | ICD-10-CM

## 2025-03-12 PROCEDURE — 1036F TOBACCO NON-USER: CPT | Performed by: SURGERY

## 2025-03-12 PROCEDURE — 1123F ACP DISCUSS/DSCN MKR DOCD: CPT | Performed by: SURGERY

## 2025-03-12 PROCEDURE — G8420 CALC BMI NORM PARAMETERS: HCPCS | Performed by: SURGERY

## 2025-03-12 PROCEDURE — G8427 DOCREV CUR MEDS BY ELIG CLIN: HCPCS | Performed by: SURGERY

## 2025-03-12 PROCEDURE — 99213 OFFICE O/P EST LOW 20 MIN: CPT | Performed by: SURGERY

## 2025-03-12 PROCEDURE — 1090F PRES/ABSN URINE INCON ASSESS: CPT | Performed by: SURGERY

## 2025-03-12 PROCEDURE — 1159F MED LIST DOCD IN RCRD: CPT | Performed by: SURGERY

## 2025-03-12 RX ORDER — MOMETASONE FUROATE 1 MG/G
CREAM TOPICAL
COMMUNITY

## 2025-03-12 RX ORDER — LETROZOLE 2.5 MG/1
TABLET, FILM COATED ORAL
COMMUNITY

## 2025-03-12 RX ORDER — METHOCARBAMOL 750 MG/1
TABLET, FILM COATED ORAL
COMMUNITY
Start: 2025-02-20

## 2025-03-12 ASSESSMENT — ENCOUNTER SYMPTOMS
EYES NEGATIVE: 1
GASTROINTESTINAL NEGATIVE: 1
SINUS PRESSURE: 1
RESPIRATORY NEGATIVE: 1
ALLERGIC/IMMUNOLOGIC NEGATIVE: 1

## 2025-03-12 NOTE — PROGRESS NOTES
dorsalis pedis 2       bypass graft         Interpretation Summary  Show Result Comparison     Moderate (50-79%) stenosis in the right internal carotid artery by velocity criteria with a ratio suggestive of greater than 70% stenosis.    Mild (<50%) stenosis in the left internal carotid artery.    Normal antegrade flow involving the right vertebral artery.    Normal antegrade flow involving the left vertebral artery.     Procedure Details    Overall the study quality was adequate.     Cerebrovascular Findings    Right Carotid    Internal Carotid Artery: Moderate (50-79%) stenosis.   Vertebral Artery: Flow is antegrade.   Left Carotid    Internal Carotid Artery: Mild (<50%) stenosis.    Vertebral Artery: Flow is antegrade.     Carotid Measurements     Right PSV Right EDV Left PSV Left EDV   ICA/CCA Ratio 6 no units        1.6 no units          CCA Prox 53.3 cm/s       12.9 cm/s       73.6 cm/s       10.9 cm/s         CCA Mid 54.6 cm/s       14.2 cm/s       56.4 cm/s       13.4 cm/s         CCA Dist 46.8 cm/s       14.2 cm/s       52.7 cm/s       14.6 cm/s         ICA Prox 282 cm/s       66.3 cm/s       53.9 cm/s       14.6 cm/s         ICA Mid 203.5 cm/s       38.8 cm/s       80.9 cm/s       17.1 cm/s         ICA Dist 134.4 cm/s       25.6 cm/s       82.1 cm/s       23.2 cm/s         ECA 61.2 cm/s       0 cm/s       52.7 cm/s       0 cm/s         Vertebral 69.2 cm/s       19.9 cm/s       49 cm/s       13.4 cm/s             ASSESSMENT:    Asymptomatic significant SRAVAN stenosis (60-70%) - stable    RECOMMENDATION/PLAN:    Observation with surveillance CDS in 6 months + OV.  Continue maximal medical therapy (statin + ASA).  Advised to contact this office or ER should she develop any lateralizing neuro symptoms.  Time spent with pt reviewing imaging study 20 mins.

## 2025-03-25 RX ORDER — SUCRALFATE 1 G/1
TABLET ORAL
Qty: 180 TABLET | Refills: 0 | Status: SHIPPED | OUTPATIENT
Start: 2025-03-25

## 2025-03-26 DIAGNOSIS — G25.0 ESSENTIAL TREMOR: ICD-10-CM

## 2025-03-26 DIAGNOSIS — I10 ESSENTIAL HYPERTENSION: ICD-10-CM

## 2025-03-26 RX ORDER — PROPRANOLOL HYDROCHLORIDE 80 MG/1
80 CAPSULE, EXTENDED RELEASE ORAL DAILY
Qty: 90 CAPSULE | Refills: 0 | Status: SHIPPED | OUTPATIENT
Start: 2025-03-26

## 2025-03-26 RX ORDER — VALSARTAN 160 MG/1
160 TABLET ORAL DAILY
Qty: 90 TABLET | Refills: 0 | Status: SHIPPED | OUTPATIENT
Start: 2025-03-26

## 2025-03-31 ENCOUNTER — HOSPITAL ENCOUNTER (OUTPATIENT)
Dept: WOMENS IMAGING | Age: 89
Discharge: HOME | End: 2025-03-31
Payer: MEDICARE

## 2025-03-31 VITALS — BODY MASS INDEX: 21.21 KG/M2 | WEIGHT: 132 LBS | HEIGHT: 66 IN

## 2025-03-31 DIAGNOSIS — Z12.31 VISIT FOR SCREENING MAMMOGRAM: ICD-10-CM

## 2025-03-31 PROCEDURE — 77063 BREAST TOMOSYNTHESIS BI: CPT

## 2025-04-02 DIAGNOSIS — G25.0 ESSENTIAL TREMOR: ICD-10-CM

## 2025-04-03 RX ORDER — PROPRANOLOL HYDROCHLORIDE 80 MG/1
80 CAPSULE, EXTENDED RELEASE ORAL DAILY
Qty: 90 CAPSULE | Refills: 0 | Status: SHIPPED | OUTPATIENT
Start: 2025-04-03

## 2025-06-24 DIAGNOSIS — I10 ESSENTIAL HYPERTENSION: ICD-10-CM

## 2025-06-24 RX ORDER — VALSARTAN 160 MG/1
160 TABLET ORAL DAILY
Qty: 90 TABLET | Refills: 0 | Status: SHIPPED | OUTPATIENT
Start: 2025-06-24

## 2025-07-03 ENCOUNTER — TELEPHONE (OUTPATIENT)
Dept: FAMILY MEDICINE CLINIC | Age: 89
End: 2025-07-03

## 2025-07-07 ENCOUNTER — OFFICE VISIT (OUTPATIENT)
Dept: FAMILY MEDICINE CLINIC | Age: 89
End: 2025-07-07

## 2025-07-07 VITALS
HEART RATE: 68 BPM | BODY MASS INDEX: 20.98 KG/M2 | OXYGEN SATURATION: 94 % | WEIGHT: 130 LBS | RESPIRATION RATE: 16 BRPM

## 2025-07-07 DIAGNOSIS — M43.6 TORTICOLLIS: ICD-10-CM

## 2025-07-07 DIAGNOSIS — M54.2 NECK PAIN: Primary | ICD-10-CM

## 2025-07-07 DIAGNOSIS — N39.41 URGE INCONTINENCE: ICD-10-CM

## 2025-07-07 RX ORDER — MIRABEGRON 50 MG/1
50 TABLET, FILM COATED, EXTENDED RELEASE ORAL DAILY
Qty: 30 TABLET | Refills: 2 | Status: SHIPPED | OUTPATIENT
Start: 2025-07-07

## 2025-07-07 RX ORDER — PREDNISONE 10 MG/1
TABLET ORAL
Qty: 12 TABLET | Refills: 0 | Status: SHIPPED | OUTPATIENT
Start: 2025-07-07

## 2025-07-07 NOTE — PROGRESS NOTES
palpation left cervical paraspinal area and muscle with tightness, tension, and muscle spasm.  No significant cervical spine midline tenderness.  Negative Spurling test                An electronic signature was used to authenticate this note.    --Abhishek Sam MD

## 2025-07-15 ENCOUNTER — HOSPITAL ENCOUNTER (OUTPATIENT)
Dept: PHYSICAL THERAPY | Age: 89
Setting detail: THERAPIES SERIES
Discharge: HOME | End: 2025-07-15
Attending: FAMILY MEDICINE
Payer: MEDICARE

## 2025-07-15 PROCEDURE — 97140 MANUAL THERAPY 1/> REGIONS: CPT

## 2025-07-15 PROCEDURE — 97161 PT EVAL LOW COMPLEX 20 MIN: CPT

## 2025-07-15 PROCEDURE — 97530 THERAPEUTIC ACTIVITIES: CPT

## 2025-07-15 NOTE — PLAN OF CARE
[facility] - Outpatient Rehabilitation and Therapy: [address], [place] [address] office: [phone] fax: [phone]     Physical Therapy Initial Evaluation Certification    Dear Abhishek Sam MD ,    We had the pleasure of evaluating the following patient for physical therapy services at [facility].  A summary of our findings can be found in the initial assessment below.  This includes our plan of care.  If you have any questions or concerns regarding these findings, please do not hesitate to contact me at the office phone number listed above.  Thank you for the referral.     Physician Signature:_______________________________Date:__________________  By signing above (or electronic signature), therapist’s plan is approved by physician       Physical Therapy: TREATMENT/PROGRESS NOTE   Patient: Maggie Cox (89 y.o. female)   Examination Date: 07/15/2025   :  1935 MRN: [id number]   Visit #: 1   Insurance Allowable Auth Needed   Visits this yr:1 []Yes    []No    Insurance: Payor: MEDICARE / Plan: MEDICARE PART A AND B / Product Type: *No Product type* /   Insurance ID: [id number] - (Medicare)  Secondary Insurance (if applicable): [place] BCBS   Treatment Diagnosis:   No diagnosis found.   Medical Diagnosis:  Neck pain [M54.2]   Referring Physician: Abhishek Sam MD  PCP: Abhishek Sam MD     Plan of care signed (Y/N):     Date of Patient follow up with Physician:      Plan of Care Report: EVAL today  POC update due: (10 visits /OR AUTH LIMITS, whichever is less)  2025                                             Medical History:  Comorbidities:  Hypertension  Osteoarthritis  Prior Surgeries: Neck Surgery- Disectomy. Left TSA. B TKR  Other: HLD. Hx of blood clots. Hx of factor VIII deficiency   Relevant Medical History: Cervical Fusion                                         Precautions/ Contra-indications:           Latex allergy:

## 2025-07-17 ENCOUNTER — HOSPITAL ENCOUNTER (OUTPATIENT)
Dept: PHYSICAL THERAPY | Age: 89
Setting detail: THERAPIES SERIES
Discharge: HOME | End: 2025-07-17
Attending: FAMILY MEDICINE
Payer: MEDICARE

## 2025-07-17 PROCEDURE — 97140 MANUAL THERAPY 1/> REGIONS: CPT

## 2025-07-17 PROCEDURE — 97110 THERAPEUTIC EXERCISES: CPT

## 2025-07-17 NOTE — FLOWSHEET NOTE
[facility] - Outpatient Rehabilitation and Therapy: [address], [address], [place] [address] office: [phone] fax: [phone]         Physical Therapy: TREATMENT/PROGRESS NOTE   Patient: Maggie Cox (89 y.o. female)   Examination Date: 2025   :  1935 MRN: [id number]   Visit #: 2   Insurance Allowable Auth Needed   Visits this yr: 2 []Yes    []No    Insurance: Payor: MEDICARE / Plan: MEDICARE PART A AND B / Product Type: *No Product type* /   Insurance ID: [id number] - (Medicare)  Secondary Insurance (if applicable): [place] BCBS   Treatment Diagnosis:   No diagnosis found.   Medical Diagnosis:  Neck pain [M54.2]   Referring Physician: Abhishek Sam MD  PCP: Abhishek Sam MD     Plan of care signed (Y/N):     Date of Patient follow up with Physician:      Plan of Care Report: NO  POC update due: (10 visits /OR AUTH LIMITS, whichever is less)  2025                                             Medical History:  Comorbidities:  Hypertension  Osteoarthritis  Prior Surgeries: Neck Surgery- Disectomy. Left TSA. B TKR  Other: HLD. Hx of blood clots. Hx of factor VIII deficiency   Relevant Medical History: Cervical Fusion                                         Precautions/ Contra-indications:           Latex allergy:  NO  Pacemaker:    NO  Contraindications for Manipulation: remote history of spinal fusion (relative)  Date of Surgery:   Other:    Red Flags:  None    Suicide Screening:   The patient did not verbalize a primary behavioral concern, suicidal ideation, suicidal intent, or demonstrate suicidal behaviors.    Preferred Language for Healthcare:   [x] English       [] other:    SUBJECTIVE EXAMINATION     Patient stated complaint: Pt states about a month ago she started having neck pain on one side and then on the other. At first she though it was just a stiff neck but then it became worse. She is having a lot of pain and it is popping. The doctor put her on prednisone

## 2025-07-21 ENCOUNTER — HOSPITAL ENCOUNTER (OUTPATIENT)
Dept: PHYSICAL THERAPY | Age: 89
Setting detail: THERAPIES SERIES
Discharge: HOME | End: 2025-07-21
Attending: FAMILY MEDICINE
Payer: MEDICARE

## 2025-07-21 PROCEDURE — 97110 THERAPEUTIC EXERCISES: CPT

## 2025-07-21 PROCEDURE — 97140 MANUAL THERAPY 1/> REGIONS: CPT

## 2025-07-21 NOTE — FLOWSHEET NOTE
No      CHARGE CAPTURE     PT CHARGE GRID   CPT Code (TIMED) minutes # CPT Code (UNTIMED) #     Therex (30083)  15 1  EVAL:LOW (25462 - Typically 20 minutes face-to-face)     Neuromusc. Re-ed (54519)    Re-Eval (99483)     Manual (63794) 25 2  Estim Unattended (29404)     Ther. Act (83181)    Mech. Traction (71245)     Gait (72344)    Dry Needle 1-2 muscle (35858)     Aquatic Therex (16205)    Dry Needle 3+ muscle (59148)     Iontophoresis (23389)    VASO (14911)     Ultrasound (53029)    Group Therapy (40996)     Estim Attended (81775)    Canalith Repositioning (10409)     Physical Performance Test (94998)    Custom orthotic ()     Other:    Other:    Total Timed Code Tx Minutes 40 3       Total Treatment Minutes 40        Charge Justification:  (43650) THERAPEUTIC EXERCISE - Provided verbal/tactile cueing for HEP and/or activities related to strengthening, flexibility, endurance, ROM performed to prevent loss of range of motion, maintain or improve muscular strength or increase flexibility, following either an injury or surgery.   (43952) MANUAL THERAPY -  Manual therapy techniques, 1 or more regions, each 15 minutes (Mobilization/manipulation, manual lymphatic drainage, manual traction) for the purpose of modulating pain, promoting relaxation,  increasing ROM, reducing/eliminating soft tissue swelling/inflammation/restriction, improving soft tissue extensibility and allowing for proper ROM for normal function with self care, mobility, lifting and ambulation    GOALS     Patient stated goal: no pain. Improved cervical mobility  [] Progressing: [] Met: [] Not Met: [] Adjusted    Therapist goals for Patient:   Short Term Goals: To be achieved in: 2 weeks  Independent in HEP and progression per patient tolerance, in order to prevent re-injury.   [] Progressing: [] Met: [] Not Met: [] Adjusted  Patient will have a decrease in pain to <5/10 to facilitate improvement in movement, function, and ADLs as indicated by

## 2025-07-22 ENCOUNTER — APPOINTMENT (OUTPATIENT)
Dept: PHYSICAL THERAPY | Age: 89
End: 2025-07-22
Attending: FAMILY MEDICINE
Payer: MEDICARE

## 2025-07-23 ENCOUNTER — HOSPITAL ENCOUNTER (OUTPATIENT)
Dept: PHYSICAL THERAPY | Age: 89
Setting detail: THERAPIES SERIES
Discharge: HOME | End: 2025-07-23
Attending: FAMILY MEDICINE
Payer: MEDICARE

## 2025-07-23 PROCEDURE — 97110 THERAPEUTIC EXERCISES: CPT

## 2025-07-23 PROCEDURE — 97112 NEUROMUSCULAR REEDUCATION: CPT

## 2025-07-23 PROCEDURE — 97140 MANUAL THERAPY 1/> REGIONS: CPT

## 2025-07-23 NOTE — FLOWSHEET NOTE
[facility] - Outpatient Rehabilitation and Therapy: [address], [address], [place] [address] office: [phone] fax: [phone]         Physical Therapy: TREATMENT/PROGRESS NOTE   Patient: Maggie Cox (89 y.o. female)   Examination Date: 2025   :  1935 MRN: [id number]   Visit #: 4   Insurance Allowable Auth Needed   Visits this yr: 2 []Yes    []No    Insurance: Payor: MEDICARE / Plan: MEDICARE PART A AND B / Product Type: *No Product type* /   Insurance ID: [id number] - (Medicare)  Secondary Insurance (if applicable): [place] BCBS   Treatment Diagnosis:   No diagnosis found.   Medical Diagnosis:  Neck pain [M54.2]   Referring Physician: Abhishek Sam MD  PCP: Abhishek Sam MD     Plan of care signed (Y/N):     Date of Patient follow up with Physician:      Plan of Care Report: NO  POC update due: (10 visits /OR AUTH LIMITS, whichever is less)  2025                                             Medical History:  Comorbidities:  Hypertension  Osteoarthritis  Prior Surgeries: Neck Surgery- Disectomy. Left TSA. B TKR  Other: HLD. Hx of blood clots. Hx of factor VIII deficiency   Relevant Medical History: Cervical Fusion                                         Precautions/ Contra-indications:           Latex allergy:  NO  Pacemaker:    NO  Contraindications for Manipulation: remote history of spinal fusion (relative)  Date of Surgery:   Other:    Red Flags:  None    Suicide Screening:   The patient did not verbalize a primary behavioral concern, suicidal ideation, suicidal intent, or demonstrate suicidal behaviors.    Preferred Language for Healthcare:   [x] English       [] other:    SUBJECTIVE EXAMINATION     Patient stated complaint: Pt states about a month ago she started having neck pain on one side and then on the other. At first she though it was just a stiff neck but then it became worse. She is having a lot of pain and it is popping. The doctor put her on prednisone

## 2025-07-24 ENCOUNTER — APPOINTMENT (OUTPATIENT)
Dept: PHYSICAL THERAPY | Age: 89
End: 2025-07-24
Attending: FAMILY MEDICINE
Payer: MEDICARE

## 2025-07-29 ENCOUNTER — HOSPITAL ENCOUNTER (OUTPATIENT)
Dept: PHYSICAL THERAPY | Age: 89
Setting detail: THERAPIES SERIES
Discharge: HOME | End: 2025-07-29
Attending: FAMILY MEDICINE
Payer: MEDICARE

## 2025-07-29 PROCEDURE — 97140 MANUAL THERAPY 1/> REGIONS: CPT

## 2025-07-29 PROCEDURE — 97110 THERAPEUTIC EXERCISES: CPT

## 2025-07-29 NOTE — FLOWSHEET NOTE
[facility] - Outpatient Rehabilitation and Therapy: [address], [address], [place] [address] office: [phone] fax: [phone]         Physical Therapy: TREATMENT/PROGRESS NOTE   Patient: Maggie Cox (89 y.o. female)   Examination Date: 2025   :  1935 MRN: [id number]   Visit #: 5   Insurance Allowable Auth Needed   Visits this yr: 2 []Yes    []No    Insurance: Payor: MEDICARE / Plan: MEDICARE PART A AND B / Product Type: *No Product type* /   Insurance ID: [id number] - (Medicare)  Secondary Insurance (if applicable): [place] BCBS   Treatment Diagnosis:   No diagnosis found.   Medical Diagnosis:  Neck pain [M54.2]   Referring Physician: Abhishek Sam MD  PCP: Abhishek Sam MD     Plan of care signed (Y/N):     Date of Patient follow up with Physician:      Plan of Care Report: NO  POC update due: (10 visits /OR AUTH LIMITS, whichever is less)  2025                                             Medical History:  Comorbidities:  Hypertension  Osteoarthritis  Prior Surgeries: Neck Surgery- Disectomy. Left TSA. B TKR  Other: HLD. Hx of blood clots. Hx of factor VIII deficiency   Relevant Medical History: Cervical Fusion                                         Precautions/ Contra-indications:           Latex allergy:  NO  Pacemaker:    NO  Contraindications for Manipulation: remote history of spinal fusion (relative)  Date of Surgery:   Other:    Red Flags:  None    Suicide Screening:   The patient did not verbalize a primary behavioral concern, suicidal ideation, suicidal intent, or demonstrate suicidal behaviors.    Preferred Language for Healthcare:   [x] English       [] other:    SUBJECTIVE EXAMINATION     Patient stated complaint: Pt states about a month ago she started having neck pain on one side and then on the other. At first she though it was just a stiff neck but then it became worse. She is having a lot of pain and it is popping. The doctor put her on prednisone

## 2025-07-31 ENCOUNTER — HOSPITAL ENCOUNTER (OUTPATIENT)
Dept: PHYSICAL THERAPY | Age: 89
Setting detail: THERAPIES SERIES
Discharge: HOME | End: 2025-07-31
Attending: FAMILY MEDICINE
Payer: MEDICARE

## 2025-07-31 PROCEDURE — 97110 THERAPEUTIC EXERCISES: CPT

## 2025-07-31 PROCEDURE — 97140 MANUAL THERAPY 1/> REGIONS: CPT

## 2025-07-31 NOTE — FLOWSHEET NOTE
[facility] - Outpatient Rehabilitation and Therapy: [address], [address], [place] [address] office: [phone] fax: [phone]         Physical Therapy: TREATMENT/PROGRESS NOTE   Patient: Maggie Cox (89 y.o. female)   Examination Date: 2025   :  1935 MRN: [id number]   Visit #: 6   Insurance Allowable Auth Needed   Visits this yr: 2 []Yes    []No    Insurance: Payor: MEDICARE / Plan: MEDICARE PART A AND B / Product Type: *No Product type* /   Insurance ID: [id number] - (Medicare)  Secondary Insurance (if applicable): [place] BC[place]   Treatment Diagnosis:   No diagnosis found.   Medical Diagnosis:  Neck pain [M54.2]   Referring Physician: Abhishek Sam MD  PCP: Abhishek Sam MD     Plan of care signed (Y/N):     Date of Patient follow up with Physician:      Plan of Care Report: NO  POC update due: (10 visits /OR AUTH LIMITS, whichever is less)  2025                                             Medical History:  Comorbidities:  Hypertension  Osteoarthritis  Prior Surgeries: Neck Surgery- Disectomy. Left TSA. B TKR  Other: HLD. Hx of blood clots. Hx of factor VIII deficiency   Relevant Medical History: Cervical Fusion                                         Precautions/ Contra-indications:           Latex allergy:  NO  Pacemaker:    NO  Contraindications for Manipulation: remote history of spinal fusion (relative)  Date of Surgery:   Other:    Red Flags:  None    Suicide Screening:   The patient did not verbalize a primary behavioral concern, suicidal ideation, suicidal intent, or demonstrate suicidal behaviors.    Preferred Language for Healthcare:   [x] English       [] other:    SUBJECTIVE EXAMINATION     Patient stated complaint: Pt states about a month ago she started having neck pain on one side and then on the other. At first she though it was just a stiff neck but then it became worse. She is having a lot of pain and it is popping. The doctor put her on prednisone

## 2025-08-05 ENCOUNTER — HOSPITAL ENCOUNTER (OUTPATIENT)
Dept: PHYSICAL THERAPY | Age: 89
Setting detail: THERAPIES SERIES
Discharge: HOME | End: 2025-08-05
Attending: FAMILY MEDICINE
Payer: MEDICARE

## 2025-08-05 PROCEDURE — 97140 MANUAL THERAPY 1/> REGIONS: CPT

## 2025-08-05 PROCEDURE — 97110 THERAPEUTIC EXERCISES: CPT

## 2025-08-07 ENCOUNTER — HOSPITAL ENCOUNTER (OUTPATIENT)
Dept: PHYSICAL THERAPY | Age: 89
Setting detail: THERAPIES SERIES
Discharge: HOME | End: 2025-08-07
Attending: FAMILY MEDICINE
Payer: MEDICARE

## 2025-08-07 PROCEDURE — 97110 THERAPEUTIC EXERCISES: CPT

## 2025-08-07 PROCEDURE — 97140 MANUAL THERAPY 1/> REGIONS: CPT

## 2025-08-12 ENCOUNTER — HOSPITAL ENCOUNTER (OUTPATIENT)
Dept: PHYSICAL THERAPY | Age: 89
Setting detail: THERAPIES SERIES
Discharge: HOME | End: 2025-08-12
Attending: FAMILY MEDICINE
Payer: MEDICARE

## 2025-08-12 PROCEDURE — 97110 THERAPEUTIC EXERCISES: CPT

## 2025-08-12 PROCEDURE — 97140 MANUAL THERAPY 1/> REGIONS: CPT

## 2025-08-13 ENCOUNTER — TELEPHONE (OUTPATIENT)
Dept: FAMILY MEDICINE CLINIC | Age: 89
End: 2025-08-13

## 2025-08-13 DIAGNOSIS — M54.12 CERVICAL RADICULOPATHY: Primary | ICD-10-CM

## 2025-08-14 ENCOUNTER — HOSPITAL ENCOUNTER (OUTPATIENT)
Dept: PHYSICAL THERAPY | Age: 89
Setting detail: THERAPIES SERIES
Discharge: HOME | End: 2025-08-14
Attending: FAMILY MEDICINE
Payer: MEDICARE

## 2025-08-14 PROCEDURE — 97140 MANUAL THERAPY 1/> REGIONS: CPT

## 2025-08-18 ENCOUNTER — HOSPITAL ENCOUNTER (OUTPATIENT)
Dept: MRI IMAGING | Age: 89
Discharge: HOME | End: 2025-08-18
Attending: FAMILY MEDICINE
Payer: MEDICARE

## 2025-08-18 DIAGNOSIS — M54.12 CERVICAL RADICULOPATHY: ICD-10-CM

## 2025-08-18 PROCEDURE — 72141 MRI NECK SPINE W/O DYE: CPT

## 2025-08-19 ENCOUNTER — HOSPITAL ENCOUNTER (OUTPATIENT)
Dept: PHYSICAL THERAPY | Age: 89
Setting detail: THERAPIES SERIES
End: 2025-08-19
Attending: FAMILY MEDICINE
Payer: MEDICARE

## 2025-08-20 ENCOUNTER — PATIENT MESSAGE (OUTPATIENT)
Dept: FAMILY MEDICINE CLINIC | Age: 89
End: 2025-08-20

## 2025-08-21 ENCOUNTER — APPOINTMENT (OUTPATIENT)
Dept: PHYSICAL THERAPY | Age: 89
End: 2025-08-21
Attending: FAMILY MEDICINE
Payer: MEDICARE

## 2025-08-26 ENCOUNTER — HOSPITAL ENCOUNTER (OUTPATIENT)
Dept: GENERAL RADIOLOGY | Age: 89
Discharge: HOME | End: 2025-08-26
Payer: MEDICARE

## 2025-08-26 ENCOUNTER — HOSPITAL ENCOUNTER (OUTPATIENT)
Dept: PHYSICAL THERAPY | Age: 89
Setting detail: THERAPIES SERIES
Discharge: HOME | End: 2025-08-26
Attending: FAMILY MEDICINE
Payer: MEDICARE

## 2025-08-26 DIAGNOSIS — M54.2 NECK PAIN: ICD-10-CM

## 2025-08-26 PROCEDURE — 72050 X-RAY EXAM NECK SPINE 4/5VWS: CPT

## 2025-08-26 PROCEDURE — 97140 MANUAL THERAPY 1/> REGIONS: CPT

## 2025-08-28 ENCOUNTER — HOSPITAL ENCOUNTER (OUTPATIENT)
Dept: PHYSICAL THERAPY | Age: 89
Setting detail: THERAPIES SERIES
Discharge: HOME | End: 2025-08-28
Attending: FAMILY MEDICINE
Payer: MEDICARE

## 2025-08-28 PROCEDURE — 97140 MANUAL THERAPY 1/> REGIONS: CPT

## 2025-09-03 ENCOUNTER — HOSPITAL ENCOUNTER (OUTPATIENT)
Dept: PHYSICAL THERAPY | Age: 89
Setting detail: THERAPIES SERIES
Discharge: HOME | End: 2025-09-03
Attending: FAMILY MEDICINE
Payer: MEDICARE

## 2025-09-03 PROCEDURE — 97140 MANUAL THERAPY 1/> REGIONS: CPT

## (undated) DEVICE — SKIN MARKER REGULAR TIP WITH RULER CAP AND LABELS: Brand: DEVON

## (undated) DEVICE — SUTURE VCRL + SZ 3-0 L27IN ABSRB UD L26MM SH 1/2 CIR VCP416H

## (undated) DEVICE — SHEET, T, LAPAROTOMY, STERILE: Brand: MEDLINE

## (undated) DEVICE — BLADE ES L4IN INSUL EDGE

## (undated) DEVICE — APPLIER LIG CLP M L11IN TI STR RNG HNDL FOR 20 CLP DISP

## (undated) DEVICE — CANISTER, RIGID, 1200CC: Brand: MEDLINE INDUSTRIES, INC.

## (undated) DEVICE — PROVE COVER: Brand: UNBRANDED

## (undated) DEVICE — BLADE ES ELASTOMERIC COAT INSUL DURABLE BEND UPTO 90DEG

## (undated) DEVICE — MINOR SET UP: Brand: MEDLINE INDUSTRIES, INC.

## (undated) DEVICE — ADHESIVE SKIN CLOSURE TOP 36 CC HI VISC DERMBND MINI

## (undated) DEVICE — SUTURE VCRL + SZ 2-0 L27IN ABSRB WHT SH 1/2 CIR TAPERCUT VCP417H

## (undated) DEVICE — GOWN SIRUS NONREIN LG W/TWL: Brand: MEDLINE INDUSTRIES, INC.

## (undated) DEVICE — ADHESIVE SKIN CLSR 0.7ML TOP DERMBND ADV

## (undated) DEVICE — ELECTRODE PT RET AD L9FT HI MOIST COND ADH HYDRGEL CORDED

## (undated) DEVICE — SOLUTION IV IRRIG 500ML 0.9% SODIUM CHL 2F7123

## (undated) DEVICE — ATTACHMENT SMK EVAC FOR ES PNCL ACCUVAC

## (undated) DEVICE — SUTURE MCRYL + SZ 4-0 L18IN ABSRB UD L19MM PS-2 3/8 CIR MCP496G

## (undated) DEVICE — GLOVE ORANGE PI 7   MSG9070

## (undated) DEVICE — TUBING, SUCTION, 1/4" X 12', STRAIGHT: Brand: MEDLINE

## (undated) DEVICE — GAUZE FLUFF 2 PLY: Brand: DEROYAL

## (undated) DEVICE — FORCEPS BX 240CM 2.4MM L NDL RAD JAW 4 M00513334